# Patient Record
Sex: MALE | Race: WHITE | NOT HISPANIC OR LATINO | Employment: FULL TIME | URBAN - NONMETROPOLITAN AREA
[De-identification: names, ages, dates, MRNs, and addresses within clinical notes are randomized per-mention and may not be internally consistent; named-entity substitution may affect disease eponyms.]

---

## 2017-02-23 RX ORDER — TOPIRAMATE 100 MG/1
CAPSULE, EXTENDED RELEASE ORAL
Qty: 30 CAPSULE | Refills: 5 | Status: SHIPPED | OUTPATIENT
Start: 2017-02-23 | End: 2018-07-26 | Stop reason: SDUPTHER

## 2017-02-24 ENCOUNTER — OFFICE VISIT (OUTPATIENT)
Dept: ENDOCRINOLOGY | Facility: CLINIC | Age: 52
End: 2017-02-24

## 2017-02-24 ENCOUNTER — LAB (OUTPATIENT)
Dept: LAB | Facility: HOSPITAL | Age: 52
End: 2017-02-24

## 2017-02-24 VITALS
BODY MASS INDEX: 40.61 KG/M2 | SYSTOLIC BLOOD PRESSURE: 122 MMHG | HEIGHT: 62 IN | WEIGHT: 220.7 LBS | DIASTOLIC BLOOD PRESSURE: 72 MMHG | HEART RATE: 85 BPM

## 2017-02-24 DIAGNOSIS — E29.1 HYPOGONADISM IN MALE: ICD-10-CM

## 2017-02-24 DIAGNOSIS — E66.01 MORBID OBESITY DUE TO EXCESS CALORIES (HCC): ICD-10-CM

## 2017-02-24 DIAGNOSIS — Z78.9 MALE-TO-FEMALE TRANSGENDER PERSON: Primary | ICD-10-CM

## 2017-02-24 DIAGNOSIS — I10 ESSENTIAL HYPERTENSION: ICD-10-CM

## 2017-02-24 DIAGNOSIS — N18.30 CKD (CHRONIC KIDNEY DISEASE) STAGE 3, GFR 30-59 ML/MIN (HCC): ICD-10-CM

## 2017-02-24 DIAGNOSIS — E55.9 VITAMIN D DEFICIENCY: ICD-10-CM

## 2017-02-24 DIAGNOSIS — Z78.9 MALE-TO-FEMALE TRANSGENDER PERSON: ICD-10-CM

## 2017-02-24 LAB
25(OH)D3 SERPL-MCNC: 33 NG/ML (ref 30–100)
ALBUMIN SERPL-MCNC: 4.2 G/DL (ref 3.4–4.8)
ALBUMIN/GLOB SERPL: 1.1 G/DL (ref 1.1–1.8)
ALP SERPL-CCNC: 125 U/L (ref 38–126)
ALT SERPL W P-5'-P-CCNC: 17 U/L (ref 21–72)
ANION GAP SERPL CALCULATED.3IONS-SCNC: 12 MMOL/L (ref 5–15)
AST SERPL-CCNC: 44 U/L (ref 17–59)
BASOPHILS # BLD AUTO: 0.04 10*3/MM3 (ref 0–0.2)
BASOPHILS NFR BLD AUTO: 0.4 % (ref 0–2)
BILIRUB SERPL-MCNC: 1.1 MG/DL (ref 0.2–1.3)
BUN BLD-MCNC: 18 MG/DL (ref 7–21)
BUN/CREAT SERPL: 14.4 (ref 7–25)
CALCIUM SPEC-SCNC: 9.8 MG/DL (ref 8.4–10.2)
CHLORIDE SERPL-SCNC: 106 MMOL/L (ref 95–110)
CO2 SERPL-SCNC: 25 MMOL/L (ref 22–31)
CREAT BLD-MCNC: 1.25 MG/DL (ref 0.7–1.3)
DEPRECATED RDW RBC AUTO: 45.6 FL (ref 35.1–43.9)
EOSINOPHIL # BLD AUTO: 0.23 10*3/MM3 (ref 0–0.7)
EOSINOPHIL NFR BLD AUTO: 2.1 % (ref 0–7)
ERYTHROCYTE [DISTWIDTH] IN BLOOD BY AUTOMATED COUNT: 14 % (ref 11.5–14.5)
GFR SERPL CREATININE-BSD FRML MDRD: 60 ML/MIN/1.73 (ref 56–130)
GLOBULIN UR ELPH-MCNC: 3.7 GM/DL (ref 2.3–3.5)
GLUCOSE BLD-MCNC: 110 MG/DL (ref 60–100)
HCT VFR BLD AUTO: 43 % (ref 39–49)
HGB BLD-MCNC: 14.7 G/DL (ref 13.7–17.3)
IMM GRANULOCYTES # BLD: 0.02 10*3/MM3 (ref 0–0.02)
IMM GRANULOCYTES NFR BLD: 0.2 % (ref 0–0.5)
LYMPHOCYTES # BLD AUTO: 2.3 10*3/MM3 (ref 0.6–4.2)
LYMPHOCYTES NFR BLD AUTO: 21.1 % (ref 10–50)
MCH RBC QN AUTO: 30.4 PG (ref 26.5–34)
MCHC RBC AUTO-ENTMCNC: 34.2 G/DL (ref 31.5–36.3)
MCV RBC AUTO: 89 FL (ref 80–98)
MONOCYTES # BLD AUTO: 0.61 10*3/MM3 (ref 0–0.9)
MONOCYTES NFR BLD AUTO: 5.6 % (ref 0–12)
NEUTROPHILS # BLD AUTO: 7.68 10*3/MM3 (ref 2–8.6)
NEUTROPHILS NFR BLD AUTO: 70.6 % (ref 37–80)
PLATELET # BLD AUTO: 319 10*3/MM3 (ref 150–450)
PMV BLD AUTO: 11.4 FL (ref 8–12)
POTASSIUM BLD-SCNC: 4.8 MMOL/L (ref 3.5–5.1)
PROT SERPL-MCNC: 7.9 G/DL (ref 6.3–8.6)
RBC # BLD AUTO: 4.83 10*6/MM3 (ref 4.37–5.74)
SODIUM BLD-SCNC: 143 MMOL/L (ref 137–145)
VIT B12 BLD-MCNC: 323 PG/ML (ref 239–931)
WBC NRBC COR # BLD: 10.88 10*3/MM3 (ref 3.2–9.8)

## 2017-02-24 PROCEDURE — 84153 ASSAY OF PSA TOTAL: CPT | Performed by: INTERNAL MEDICINE

## 2017-02-24 PROCEDURE — 82670 ASSAY OF TOTAL ESTRADIOL: CPT | Performed by: INTERNAL MEDICINE

## 2017-02-24 PROCEDURE — 99214 OFFICE O/P EST MOD 30 MIN: CPT | Performed by: INTERNAL MEDICINE

## 2017-02-24 PROCEDURE — 85025 COMPLETE CBC W/AUTO DIFF WBC: CPT | Performed by: INTERNAL MEDICINE

## 2017-02-24 PROCEDURE — 84403 ASSAY OF TOTAL TESTOSTERONE: CPT | Performed by: INTERNAL MEDICINE

## 2017-02-24 PROCEDURE — 82306 VITAMIN D 25 HYDROXY: CPT | Performed by: INTERNAL MEDICINE

## 2017-02-24 PROCEDURE — 80053 COMPREHEN METABOLIC PANEL: CPT | Performed by: INTERNAL MEDICINE

## 2017-02-24 PROCEDURE — 36415 COLL VENOUS BLD VENIPUNCTURE: CPT | Performed by: INTERNAL MEDICINE

## 2017-02-24 PROCEDURE — 82607 VITAMIN B-12: CPT | Performed by: INTERNAL MEDICINE

## 2017-02-24 RX ORDER — FINASTERIDE 5 MG/1
5 TABLET, FILM COATED ORAL DAILY
Qty: 30 TABLET | Refills: 5 | Status: SHIPPED | OUTPATIENT
Start: 2017-02-24 | End: 2017-12-29 | Stop reason: SDUPTHER

## 2017-02-24 RX ORDER — ESTRADIOL 2 MG/1
TABLET ORAL
Qty: 15 TABLET | Refills: 5 | Status: SHIPPED | OUTPATIENT
Start: 2017-02-24 | End: 2017-04-25

## 2017-02-24 NOTE — PROGRESS NOTES
Rupesh Valencia Jr is a 52 y.o. male who presents for  evaluation of transgender MTF        Primary Care / Referring Provider  JOHNNY Sawant     followup     reason , transgender Male to Female    duration, since about 5 years ago     timing, constant. She recognizes that he suppressed this desire but now she no longer wants to suppress it     quality, not presently controlled    previous treatment - he took OCP, herbs  presently using makeup   responding to estradiol  aldactone not given due to ARF but on finasteride         2016 nl PSA     I have psychiatrist letter of support 10-16 and sent for scanning     Past Medical History   Diagnosis Date   • Acute sinusitis    • Benign essential hypertension    • Carpal tunnel syndrome    • Chest pain    • Chronic sinusitis    • Cough    • Diabetes mellitus without complication      type II or unspecified type, not stated as uncontrolled      • Diarrhea of presumed infectious origin    • Dyspnea    • Dyspnea on exertion      Angina equivalent      • Dysuria    • Essential hypertension    • Gender dysphoria       transgendered, crossdresses      • Hyperkeratosis    • Hypogonadism in male 2/24/2017   • Impaired fasting blood sugar    • Inflamed seborrheic keratosis    • Male hypogonadism    • Male-to-female transgender person 2/24/2017   • Male-to-female transsexuality    • Migraine    • Morbid obesity due to excess calories 2/24/2017   • Obesity    • Pain in joint      unspecified   • Poisoning due to venomous spider    • Renal impairment    • Routine general medical examination at a health care facility    • Special screening for malignant neoplasm of prostate    • Vitamin D deficiency 2/24/2017     Family History   Problem Relation Age of Onset   • Breast cancer Mother    • Diabetes Mother    • Diabetes Father    • Prostate cancer Father    • Skin cancer Father      Social History   Substance Use Topics   • Smoking status: Never Smoker   • Smokeless tobacco:  Not on file   • Alcohol use No      Comment: lizy         Current Outpatient Prescriptions:   •  amitriptyline (ELAVIL) 50 MG tablet, 1 OR 2 TAB(S) BY MOUTH AT BEDTIME AS NEEDED, Disp: 60 tablet, Rfl: 5  •  atenolol (TENORMIN) 50 MG tablet, Take 1 tablet by mouth Daily., Disp: 30 tablet, Rfl: 2  •  estradiol (ESTRACE) 2 MG tablet, , Disp: , Rfl: 5  •  finasteride (PROSCAR) 5 MG tablet, , Disp: , Rfl: 5  •  fluticasone (FLONASE) 50 MCG/ACT nasal spray, 1 spray into each nostril 2 (Two) Times a Day. Spray in each nostril., Disp: , Rfl:   •  isosorbide mononitrate (IMDUR) 30 MG 24 hr tablet, , Disp: , Rfl: 6  •  potassium chloride (K-DUR,KLOR-CON) 20 MEQ CR tablet, TAKE 2 TABLETS ON DAY 1 THEN TAKE ONE TABLET BY MOUTH EVERY DAY THEREAFTER, Disp: 30 tablet, Rfl: 2  •  sulfamethoxazole-trimethoprim (BACTRIM DS,SEPTRA DS) 800-160 MG per tablet, Take 1 tablet by mouth 2 (Two) Times a Day., Disp: , Rfl:   •  tamsulosin (FLOMAX) 0.4 MG capsule 24 hr capsule, Take 1 capsule by mouth Daily., Disp: 30 capsule, Rfl: 2  •  TROKENDI  MG capsule sustained-release 24 hr, ONE BY MOUTH DAILY, Disp: 30 capsule, Rfl: 5  •  venlafaxine XR (EFFEXOR-XR) 75 MG 24 hr capsule, Take one cap  daily, Disp: 30 capsule, Rfl: 5    Review of Systems    Review of Systems   Constitutional: Positive for fatigue. Negative for activity change, appetite change, chills, diaphoresis, fever and unexpected weight change.   HENT: Negative for congestion, drooling, ear discharge, ear pain, facial swelling, mouth sores, nosebleeds, postnasal drip, rhinorrhea, sinus pressure, sneezing, sore throat, tinnitus, trouble swallowing and voice change.    Eyes: Negative for photophobia, pain, discharge, redness, itching and visual disturbance.   Respiratory: Negative for apnea, cough, choking, chest tightness, shortness of breath, wheezing and stridor.    Cardiovascular: Negative for chest pain, palpitations and leg swelling.   Gastrointestinal: Negative for  "abdominal distention, abdominal pain, anal bleeding, blood in stool, constipation, diarrhea, nausea, rectal pain and vomiting.   Endocrine: Negative for cold intolerance, heat intolerance, polydipsia, polyphagia and polyuria.        Breast enlargement    Genitourinary: Negative for difficulty urinating, dysuria, enuresis, flank pain, frequency, hematuria and urgency.        Decreased penile size    Musculoskeletal: Negative for arthralgias, back pain, gait problem, joint swelling, myalgias, neck pain and neck stiffness.   Skin: Negative for color change, pallor and wound.        Softer skin  Decrease body hair   Less sweating   Allergic/Immunologic: Negative for environmental allergies, food allergies and immunocompromised state.   Neurological: Negative for dizziness, tremors, seizures, syncope, facial asymmetry, speech difficulty, weakness, light-headedness, numbness and headaches.   Hematological: Negative for adenopathy. Does not bruise/bleed easily.   Psychiatric/Behavioral: Negative for agitation, behavioral problems, confusion, decreased concentration, dysphoric mood, hallucinations, self-injury, sleep disturbance and suicidal ideas. The patient is not nervous/anxious and is not hyperactive.         Objective:     Visit Vitals   • /72 (BP Location: Right arm, Patient Position: Sitting, Cuff Size: Large Adult)   • Pulse 85   • Ht 62\" (157.5 cm)   • Wt 220 lb 11.2 oz (100 kg)   • BMI 40.37 kg/m2       Physical Exam   Constitutional: He is oriented to person, place, and time. He appears well-developed and well-nourished. He is cooperative.   HENT:   Head: Normocephalic and atraumatic.   Right Ear: External ear normal.   Left Ear: External ear normal.   Nose: Nose normal.   Mouth/Throat: Oropharynx is clear and moist. No oropharyngeal exudate.   Eyes: Conjunctivae and EOM are normal. Pupils are equal, round, and reactive to light. No scleral icterus. Right eye exhibits normal extraocular motion. Left eye " exhibits normal extraocular motion.   Neck: Neck supple. No JVD present. No muscular tenderness present. No tracheal deviation, no edema and no erythema present. No thyromegaly present.   Cardiovascular: Normal rate, regular rhythm, normal heart sounds and intact distal pulses.  Exam reveals no gallop and no friction rub.    No murmur heard.  Pulmonary/Chest: Effort normal and breath sounds normal. No stridor. No respiratory distress. He has no decreased breath sounds. He has no wheezes. He has no rhonchi. He has no rales. He exhibits no tenderness.   Abdominal: Soft. Bowel sounds are normal. He exhibits no distension and no mass. There is no hepatomegaly. There is no tenderness. There is no rebound and no guarding. No hernia.   Musculoskeletal: Normal range of motion. He exhibits no edema, tenderness or deformity.       Vascular Status -  His exam exhibits right foot vasculature normal. His exam exhibits left foot vasculature normal.  Lymphadenopathy:     He has no cervical adenopathy.   Neurological: He is alert and oriented to person, place, and time. He has normal reflexes. No cranial nerve deficit. He exhibits normal muscle tone. Coordination normal.   Skin: Skin is warm. No rash noted. No erythema. No pallor.   Psychiatric: He has a normal mood and affect. His behavior is normal. Judgment and thought content normal.   Nursing note and vitals reviewed.      Lab Review    Results for orders placed or performed during the hospital encounter of 10/19/16   Urinalysis   Result Value Ref Range    Color, UA DK YELLOW STRAW,YELLOW,DK YELLOW,SHARMIN    Appearance CLEAR CLEAR    Specific Gravity, UA 1.015 1.003 - 1.030    pH, UA 5.0 5.0 - 9.0 pH Units    Leukocytes, UA NEGATIVE NEGATIVE    Nitrite, UA NEGATIVE NEGATIVE    Protein, UA NEGATIVE NEGATIVE    Glucose, Urine NEGATIVE NEGATIVE mg/dl    Ketones, UA NEGATIVE NEGATIVE    Urobilinogen, UA 0.2 0.2 EU/dl    Blood, UA NEGATIVE NEGATIVE   PSA, Total & Free   Result Value  Ref Range    PSA 0.5 0.0 - 4.0 ng/mL    PSA, Free 0.07 N/A ng/mL    PSA, Free % 14.0 %   Estradiol, Sensitive   Result Value Ref Range    Estradiol 36.6 (H) 8.0 - 35.0 pg/mL           Assessment/Plan       ICD-10-CM ICD-9-CM   1. Male-to-female transgender person F64.0 302.85   2. Hypogonadism in male E29.1 257.2   3. Essential hypertension I10 401.9   4. CKD (chronic kidney disease) stage 3, GFR 30-59 ml/min N18.3 585.3   5. Morbid obesity due to excess calories E66.01 278.01   6. Vitamin D deficiency E55.9 268.9            Transgender Male to Female    Male Hypogonadism         baseline prolactin ( nl ), estradiol (17)  testosterone ( 378)    Estradiol 2 mg every other day but sometimes forgetting     no aldactone due to ARF on HCTZ     finasteride 5 mg daily     Now    Component      Latest Ref Rng 5/19/2016 7/19/2016   Testosterone, Total      348 - 1197 ng/dL 378 180 (L)     Component      Latest Ref Rng 5/19/2016 7/19/2016   Estradiol      8.0 - 35.0 pg/mL 17.6 358.0 (H)     Presently not interested in surgery     rtc in 3 months      Renal Failure     Lab Results   Component Value Date    HGBA1C 5.3 07/19/2016    HGBA1C 5.3 07/08/2015    HGBA1C 5.4 02/21/2014     Lab Results   Component Value Date    GLUCOSE 122 (H) 10/19/2016    BUN 14 10/19/2016    CREATININE 1.1 10/19/2016    CO2 25 10/19/2016    CALCIUM 9.4 10/19/2016    ALBUMIN 4.4 10/19/2016    AST 50 10/19/2016    ALT 19 (L) 10/19/2016     Lab Results   Component Value Date    WBC 10.5 (H) 10/19/2016    HGB 15.1 10/19/2016    HCT 42.1 10/19/2016    MCV 87.5 10/19/2016     10/19/2016           Lipid Management  No results found for: CHOL  Lab Results   Component Value Date    TRIG 89 07/19/2016     Lab Results   Component Value Date    HDL 57 (L) 07/19/2016     Lab Results   Component Value Date    LDLCALC 91 07/19/2016       Blood Pressure Management:    Vitals:    02/24/17 1315   BP: 122/72   Pulse: 85       On atenolol 50 mg daily   imdur 30  mg er daily , had chest pain , cath nl around 2014 , started him on imdur      Preventive Care:      Not a smoker     Weight Related:   Wt Readings from Last 3 Encounters:   02/24/17 220 lb 11.2 oz (100 kg)   10/19/16 226 lb 8 oz (103 kg)   09/21/16 236 lb (107 kg)     Body mass index is 40.37 kg/(m^2).    Advised to consume 500 calories less per day    Exercise 30 min daily       Lost 15 lbs initially , now 6 more     Bone Health    Lab Results   Component Value Date    CALCIUM 9.4 10/19/2016     Reassess vit d    Thyroid Health  No results found for: TSH      Lab Results   Component Value Date    OEEDDUGY32 321 10/19/2016             I reviewed and summarized records from JOHNNY Sawant from 2016 and I reviewed / ordered labs.     No orders of the defined types were placed in this encounter.        A copy of my note was sent to JOHNNY Sawant    Please see my above opinion and suggestions.

## 2017-02-26 LAB
CONV COMMENT: NORMAL
TESTOST SERPL-MCNC: 584 NG/DL (ref 348–1197)

## 2017-03-24 ENCOUNTER — OFFICE VISIT (OUTPATIENT)
Dept: FAMILY MEDICINE CLINIC | Facility: CLINIC | Age: 52
End: 2017-03-24

## 2017-03-24 VITALS
HEART RATE: 80 BPM | SYSTOLIC BLOOD PRESSURE: 138 MMHG | DIASTOLIC BLOOD PRESSURE: 80 MMHG | BODY MASS INDEX: 41.77 KG/M2 | WEIGHT: 227 LBS | HEIGHT: 62 IN

## 2017-03-24 DIAGNOSIS — J01.91 ACUTE RECURRENT SINUSITIS, UNSPECIFIED LOCATION: Primary | ICD-10-CM

## 2017-03-24 PROCEDURE — 99213 OFFICE O/P EST LOW 20 MIN: CPT | Performed by: NURSE PRACTITIONER

## 2017-03-24 NOTE — PROGRESS NOTES
Subjective   Rupesh Wilbert Valencia Jr is a 52 y.o. male.     History of Present Illness     C/O sinus congestion and yellow drainage for 6 weeks. This is a recurrent problems but has decreased in frequency the past year.  He had a negative sinus imaging in the past and had been evaluated by ENT .    He continues to see Dr. Peter and is doing well with hormones.     The following portions of the patient's history were reviewed and updated as appropriate: allergies, current medications, past social history, past surgical history and problem list.    Review of Systems   Constitutional: Negative for activity change, chills, fatigue and fever.   HENT: Positive for congestion and postnasal drip. Negative for rhinorrhea, sinus pressure and sore throat.    Eyes: Negative for pain, discharge, itching and visual disturbance.   Respiratory: Negative for cough, shortness of breath and wheezing.    Cardiovascular: Negative for chest pain, palpitations and leg swelling.   Gastrointestinal: Negative for abdominal pain, blood in stool, constipation, diarrhea, nausea and vomiting.   Endocrine: Negative for polydipsia and polyuria.   Genitourinary: Negative for dysuria, flank pain, frequency, hematuria and urgency.   Musculoskeletal: Negative for arthralgias, back pain, gait problem and myalgias.   Skin: Negative for rash.   Neurological: Positive for headaches. Negative for dizziness, tremors, seizures, syncope and weakness.   Hematological: Negative for adenopathy. Does not bruise/bleed easily.   Psychiatric/Behavioral: Negative for confusion, dysphoric mood, sleep disturbance and suicidal ideas. The patient is not nervous/anxious.        Objective   Physical Exam   Constitutional: He is oriented to person, place, and time. He appears well-developed and well-nourished. No distress.   HENT:   Head: Normocephalic and atraumatic.   Mouth/Throat: Oropharynx is clear and moist.   Yellow/bloody nasal drainage bilateral nostrils    Eyes:  Conjunctivae are normal. Pupils are equal, round, and reactive to light.   Cardiovascular: Normal rate, regular rhythm and normal heart sounds.  Exam reveals no gallop and no friction rub.    No murmur heard.  Pulmonary/Chest: Effort normal and breath sounds normal. No respiratory distress. He has no wheezes. He has no rales.   Musculoskeletal: He exhibits no edema or deformity.   Mild lordosis. Gait steady.    Neurological: He is alert and oriented to person, place, and time.   No balance impairment.    Skin: Skin is warm and dry. No rash noted. He is not diaphoretic. No erythema. No pallor.   Psychiatric: He has a normal mood and affect. His behavior is normal.   Nursing note and vitals reviewed.      Assessment/Plan   Problems Addressed this Visit     None      Visit Diagnoses     Acute recurrent sinusitis, unspecified location    -  Primary    Relevant Medications    mupirocin (BACTROBAN) 2 % ointment    sulfamethoxazole-trimethoprim (BACTRIM DS,SEPTRA DS) 800-160 MG per tablet        Begin Bactroban ointment and Bactrim PO.  Continue current medications. Reviewed potential medication side effects and benefits. Instructed to report side effects. Report if symptoms worsen or persist. Understanding expressed.

## 2017-03-27 RX ORDER — SULFAMETHOXAZOLE AND TRIMETHOPRIM 800; 160 MG/1; MG/1
1 TABLET ORAL 2 TIMES DAILY
Qty: 20 TABLET | Refills: 0 | Status: SHIPPED | OUTPATIENT
Start: 2017-03-27 | End: 2017-07-21 | Stop reason: SDUPTHER

## 2017-04-06 RX ORDER — TAMSULOSIN HYDROCHLORIDE 0.4 MG/1
CAPSULE ORAL
Qty: 30 CAPSULE | Refills: 2 | Status: SHIPPED | OUTPATIENT
Start: 2017-04-06 | End: 2017-08-11 | Stop reason: SDUPTHER

## 2017-04-06 RX ORDER — ATENOLOL 50 MG/1
TABLET ORAL
Qty: 30 TABLET | Refills: 2 | Status: SHIPPED | OUTPATIENT
Start: 2017-04-06 | End: 2017-08-11 | Stop reason: SDUPTHER

## 2017-04-06 RX ORDER — POTASSIUM CHLORIDE 20 MEQ/1
TABLET, EXTENDED RELEASE ORAL
Qty: 30 TABLET | Refills: 2 | Status: SHIPPED | OUTPATIENT
Start: 2017-04-06 | End: 2017-11-06 | Stop reason: SDUPTHER

## 2017-04-06 RX ORDER — ISOSORBIDE MONONITRATE 30 MG/1
TABLET, EXTENDED RELEASE ORAL
Qty: 30 TABLET | Refills: 6 | Status: SHIPPED | OUTPATIENT
Start: 2017-04-06 | End: 2019-10-11

## 2017-04-21 ENCOUNTER — OFFICE VISIT (OUTPATIENT)
Dept: FAMILY MEDICINE CLINIC | Facility: CLINIC | Age: 52
End: 2017-04-21

## 2017-04-21 VITALS
WEIGHT: 229 LBS | HEIGHT: 62 IN | SYSTOLIC BLOOD PRESSURE: 154 MMHG | DIASTOLIC BLOOD PRESSURE: 92 MMHG | BODY MASS INDEX: 42.14 KG/M2 | HEART RATE: 73 BPM

## 2017-04-21 DIAGNOSIS — I10 ESSENTIAL HYPERTENSION: ICD-10-CM

## 2017-04-21 DIAGNOSIS — L30.9 DERMATITIS: Primary | ICD-10-CM

## 2017-04-21 PROCEDURE — 99213 OFFICE O/P EST LOW 20 MIN: CPT | Performed by: NURSE PRACTITIONER

## 2017-04-21 PROCEDURE — 96372 THER/PROPH/DIAG INJ SC/IM: CPT | Performed by: NURSE PRACTITIONER

## 2017-04-21 RX ORDER — TRIAMCINOLONE ACETONIDE 5 MG/G
CREAM TOPICAL 3 TIMES DAILY
Qty: 30 G | Refills: 0 | Status: SHIPPED | OUTPATIENT
Start: 2017-04-21 | End: 2017-07-21 | Stop reason: SDUPTHER

## 2017-04-21 RX ORDER — METHYLPREDNISOLONE ACETATE 80 MG/ML
80 INJECTION, SUSPENSION INTRA-ARTICULAR; INTRALESIONAL; INTRAMUSCULAR; SOFT TISSUE ONCE
Status: COMPLETED | OUTPATIENT
Start: 2017-04-21 | End: 2017-04-21

## 2017-04-21 RX ADMIN — METHYLPREDNISOLONE ACETATE 80 MG: 80 INJECTION, SUSPENSION INTRA-ARTICULAR; INTRALESIONAL; INTRAMUSCULAR; SOFT TISSUE at 16:10

## 2017-04-25 RX ORDER — ESTRADIOL 2 MG/1
2 TABLET ORAL DAILY
Qty: 30 TABLET | Refills: 11 | Status: SHIPPED | OUTPATIENT
Start: 2017-04-25 | End: 2018-05-22 | Stop reason: SDUPTHER

## 2017-07-21 ENCOUNTER — OFFICE VISIT (OUTPATIENT)
Dept: FAMILY MEDICINE CLINIC | Facility: CLINIC | Age: 52
End: 2017-07-21

## 2017-07-21 VITALS
HEART RATE: 60 BPM | HEIGHT: 62 IN | SYSTOLIC BLOOD PRESSURE: 142 MMHG | DIASTOLIC BLOOD PRESSURE: 100 MMHG | BODY MASS INDEX: 41.59 KG/M2 | WEIGHT: 226 LBS

## 2017-07-21 DIAGNOSIS — R35.89 POLYURIA: ICD-10-CM

## 2017-07-21 DIAGNOSIS — N18.30 CKD (CHRONIC KIDNEY DISEASE) STAGE 3, GFR 30-59 ML/MIN (HCC): ICD-10-CM

## 2017-07-21 DIAGNOSIS — F32.A DEPRESSION, UNSPECIFIED DEPRESSION TYPE: ICD-10-CM

## 2017-07-21 DIAGNOSIS — R73.9 HYPERGLYCEMIA: ICD-10-CM

## 2017-07-21 DIAGNOSIS — I10 ESSENTIAL HYPERTENSION: Primary | ICD-10-CM

## 2017-07-21 DIAGNOSIS — L30.9 DERMATITIS: ICD-10-CM

## 2017-07-21 DIAGNOSIS — J01.91 ACUTE RECURRENT SINUSITIS, UNSPECIFIED LOCATION: ICD-10-CM

## 2017-07-21 PROCEDURE — 99214 OFFICE O/P EST MOD 30 MIN: CPT | Performed by: NURSE PRACTITIONER

## 2017-07-21 RX ORDER — TRIAMCINOLONE ACETONIDE 5 MG/G
CREAM TOPICAL 3 TIMES DAILY
Qty: 80 G | Refills: 1 | Status: SHIPPED | OUTPATIENT
Start: 2017-07-21 | End: 2019-10-11

## 2017-07-21 RX ORDER — FLUOXETINE HYDROCHLORIDE 20 MG/1
20 CAPSULE ORAL DAILY
Qty: 30 CAPSULE | Refills: 5 | Status: SHIPPED | OUTPATIENT
Start: 2017-07-21 | End: 2018-02-05 | Stop reason: SDUPTHER

## 2017-07-21 RX ORDER — SULFAMETHOXAZOLE AND TRIMETHOPRIM 800; 160 MG/1; MG/1
1 TABLET ORAL 2 TIMES DAILY
Qty: 20 TABLET | Refills: 0 | Status: SHIPPED | OUTPATIENT
Start: 2017-07-21 | End: 2017-08-25

## 2017-07-21 RX ORDER — LOSARTAN POTASSIUM AND HYDROCHLOROTHIAZIDE 12.5; 5 MG/1; MG/1
1 TABLET ORAL DAILY
Qty: 30 TABLET | Refills: 5 | Status: SHIPPED | OUTPATIENT
Start: 2017-07-21 | End: 2018-02-05 | Stop reason: SDUPTHER

## 2017-07-21 RX ORDER — FLUCONAZOLE 150 MG/1
150 TABLET ORAL ONCE
Qty: 2 TABLET | Refills: 1 | Status: SHIPPED | OUTPATIENT
Start: 2017-07-21 | End: 2017-07-21

## 2017-07-21 RX ORDER — VENLAFAXINE HYDROCHLORIDE 37.5 MG/1
37.5 CAPSULE, EXTENDED RELEASE ORAL DAILY
Qty: 14 CAPSULE | Refills: 0 | Status: SHIPPED | OUTPATIENT
Start: 2017-07-21 | End: 2017-08-04

## 2017-07-21 NOTE — PROGRESS NOTES
Subjective   Rupesh Wilbert Valencia Jr is a 52 y.o. male.   Chief Complaint   Patient presents with   • Sinusitis         Sinusitis   Associated symptoms include congestion, headaches and sinus pressure. Pertinent negatives include no chills, coughing, shortness of breath or sore throat.      Sinus headache has been for months. He has yellow drainage.     His B/P is elevated today. He denies taking decongestants.     He has an itchy genital rash improved a little with antifungal cream.     His depression screen was positive. He relates this to being isolated in his job a a . He is usually a very social person.     PHQ-9 Depression Screening 7/21/2017   Little interest or pleasure in doing things 1   Feeling down, depressed, or hopeless 1   Trouble falling or staying asleep, or sleeping too much 1   Feeling tired or having little energy 0   Poor appetite or overeating 0   Feeling bad about yourself - or that you are a failure or have let yourself or your family down 1   Trouble concentrating on things, such as reading the newspaper or watching television 0   Moving or speaking so slowly that other people could have noticed. Or the opposite - being so fidgety or restless that you have been moving around a lot more than usual 0   Thoughts that you would be better off dead, or of hurting yourself in some way 2   PHQ-9 Total Score 6   If you checked off any problems, how difficult have these problems made it for you to do your work, take care of things at home, or get along with other people? Not difficult at all       The following portions of the patient's history were reviewed and updated as appropriate: allergies, current medications, past family history, past medical history, past surgical history and problem list.    Review of Systems   Constitutional: Negative for activity change, chills, fatigue and fever.   HENT: Positive for congestion, postnasal drip and sinus pressure. Negative for rhinorrhea and sore  throat.    Eyes: Negative for pain, discharge, itching and visual disturbance.   Respiratory: Negative for cough, shortness of breath and wheezing.    Cardiovascular: Negative for chest pain, palpitations and leg swelling.   Gastrointestinal: Negative for abdominal pain, blood in stool, constipation, diarrhea, nausea and vomiting.   Endocrine: Negative for polydipsia and polyuria.   Genitourinary: Negative for dysuria, flank pain, frequency, hematuria and urgency.   Musculoskeletal: Negative for arthralgias, back pain, gait problem and myalgias.   Skin: Positive for rash.   Neurological: Positive for headaches. Negative for dizziness, tremors, seizures, syncope and weakness.   Hematological: Negative for adenopathy. Does not bruise/bleed easily.   Psychiatric/Behavioral: Positive for dysphoric mood. Negative for confusion, sleep disturbance and suicidal ideas. The patient is not nervous/anxious.        Objective   Physical Exam   Constitutional: He is oriented to person, place, and time. He appears well-developed and well-nourished. No distress.   Cardiovascular: Normal rate, regular rhythm and normal heart sounds.  Exam reveals no gallop and no friction rub.    No murmur heard.  Pulmonary/Chest: Effort normal and breath sounds normal. No respiratory distress. He has no wheezes. He has no rales.   Musculoskeletal: He exhibits no edema or deformity.   Mild lordosis. Gait steady.    Neurological: He is alert and oriented to person, place, and time.   No balance impairment.    Skin: Skin is warm and dry. Rash noted. He is not diaphoretic. No erythema. No pallor.   Faint pink rash present to bilateral popliteal areas.    He also has a rash to the inguinal areas.  This was not assessed today.   Psychiatric: He has a normal mood and affect. His behavior is normal.   Nursing note and vitals reviewed.      Assessment/Plan   Rupesh was seen today for sinusitis.    Diagnoses and all orders for this visit:    Essential  hypertension  -     losartan-hydrochlorothiazide (HYZAAR) 50-12.5 MG per tablet; Take 1 tablet by mouth Daily.  -     Lipid panel; Future    Dermatitis  -     fluconazole (DIFLUCAN) 150 MG tablet; Take 1 tablet by mouth 1 (One) Time for 1 dose. And repeat in 3 days if needed for yeast infection.  -     triamcinolone (KENALOG) 0.5 % cream; Apply  topically 3 (Three) Times a Day. To affected area until healed.    Acute recurrent sinusitis, unspecified location  -     sulfamethoxazole-trimethoprim (BACTRIM DS,SEPTRA DS) 800-160 MG per tablet; Take 1 tablet by mouth 2 (Two) Times a Day.    Polyuria  -     Hemoglobin A1c; Future    CKD (chronic kidney disease) stage 3, GFR 30-59 ml/min    Hyperglycemia  -     Hemoglobin A1c; Future  -     Lipid panel; Future    Depression, unspecified depression type  -     venlafaxine XR (EFFEXOR XR) 37.5 MG 24 hr capsule; Take 1 capsule by mouth Daily for 14 days. For two weeks then D/C  -     FLUoxetine (PROZAC) 20 MG capsule; Take 1 capsule by mouth Daily.        Most recent labs reviewed.  Begin antibiotic.  Add losartan HCT for blood pressure. He will return for labs for myself and Dr. Peter. He will monitor his blood pressure home and report if it remains above 140/90.  Effexor dose decreased to take for 2 weeks.  Begin Prozac.  He has no phone number and will call if his depression symptoms worsen.  He will follow up in one month and sooner if needed.

## 2017-07-21 NOTE — PROGRESS NOTES
Subjective   Rupesh Valencia Jr is a 52 y.o. male.     Sinusitis   Associated symptoms include congestion and headaches. Pertinent negatives include no chills, coughing, shortness of breath, sinus pressure or sore throat.      Rupesh has been experiencing and Itchy, burning rash that began behind both knees.  The rash began after his jeans got wet at work and he believes it might have been related to the sheering of the fabric.    The following portions of the patient's history were reviewed and updated as appropriate: allergies, current medications, past family history, past medical history, past surgical history and problem list.    Review of Systems   Constitutional: Negative for activity change, chills, fatigue and fever.   HENT: Positive for congestion and postnasal drip. Negative for rhinorrhea, sinus pressure and sore throat.    Eyes: Negative for pain, discharge, itching and visual disturbance.   Respiratory: Negative for cough, shortness of breath and wheezing.    Cardiovascular: Negative for chest pain, palpitations and leg swelling.   Gastrointestinal: Negative for abdominal pain, blood in stool, constipation, diarrhea, nausea and vomiting.   Endocrine: Negative for polydipsia and polyuria.   Genitourinary: Negative for dysuria, flank pain, frequency, hematuria and urgency.   Musculoskeletal: Negative for arthralgias, back pain, gait problem and myalgias.   Skin: Positive for rash.   Neurological: Positive for headaches. Negative for dizziness, tremors, seizures, syncope and weakness.   Hematological: Negative for adenopathy. Does not bruise/bleed easily.   Psychiatric/Behavioral: Negative for confusion, dysphoric mood, sleep disturbance and suicidal ideas. The patient is not nervous/anxious.        Objective   Physical Exam   Constitutional: He is oriented to person, place, and time. He appears well-developed and well-nourished. No distress.   Cardiovascular: Normal rate, regular rhythm and normal  heart sounds.  Exam reveals no gallop and no friction rub.    No murmur heard.  Pulmonary/Chest: Effort normal and breath sounds normal. No respiratory distress. He has no wheezes. He has no rales.   Musculoskeletal: He exhibits no edema or deformity.   Mild lordosis. Gait steady.    Neurological: He is alert and oriented to person, place, and time.   No balance impairment.    Skin: Skin is warm and dry. Rash noted. He is not diaphoretic. No erythema. No pallor.   Rash present to bilateral popliteal areas.  Confluent red  patches and small vesicles present   Psychiatric: He has a normal mood and affect. His behavior is normal.   Nursing note and vitals reviewed.      Assessment/Plan   Rupesh was seen today for sinusitis.    Diagnoses and all orders for this visit:    Essential hypertension  -     losartan-hydrochlorothiazide (HYZAAR) 50-12.5 MG per tablet; Take 1 tablet by mouth Daily.  -     Lipid panel; Future    Dermatitis  -     fluconazole (DIFLUCAN) 150 MG tablet; Take 1 tablet by mouth 1 (One) Time for 1 dose. And repeat in 3 days if needed for yeast infection.  -     triamcinolone (KENALOG) 0.5 % cream; Apply  topically 3 (Three) Times a Day. To affected area until healed.    Acute recurrent sinusitis, unspecified location  -     sulfamethoxazole-trimethoprim (BACTRIM DS,SEPTRA DS) 800-160 MG per tablet; Take 1 tablet by mouth 2 (Two) Times a Day.    Polyuria  -     Hemoglobin A1c; Future    CKD (chronic kidney disease) stage 3, GFR 30-59 ml/min    Hyperglycemia  -     Hemoglobin A1c; Future  -     Lipid panel; Future    Depression, unspecified depression type  -     venlafaxine XR (EFFEXOR XR) 37.5 MG 24 hr capsule; Take 1 capsule by mouth Daily for 14 days. For two weeks then D/C  -     FLUoxetine (PROZAC) 20 MG capsule; Take 1 capsule by mouth Daily.     Steroid shot given.  He will monitor his blood pressure home and report if it remains above 140/90.  He will begin the Kenalog cream and let me know if  the rash does not improve .

## 2017-08-11 ENCOUNTER — LAB (OUTPATIENT)
Dept: LAB | Facility: OTHER | Age: 52
End: 2017-08-11

## 2017-08-11 DIAGNOSIS — R73.9 HYPERGLYCEMIA: ICD-10-CM

## 2017-08-11 DIAGNOSIS — I10 ESSENTIAL HYPERTENSION: ICD-10-CM

## 2017-08-11 DIAGNOSIS — R35.89 POLYURIA: ICD-10-CM

## 2017-08-11 LAB
ALBUMIN SERPL-MCNC: 3.7 G/DL (ref 3.2–5.5)
ALBUMIN/GLOB SERPL: 1 G/DL (ref 1–3)
ALP SERPL-CCNC: 110 U/L (ref 15–121)
ALT SERPL W P-5'-P-CCNC: 16 U/L (ref 10–60)
ANION GAP SERPL CALCULATED.3IONS-SCNC: 9 MMOL/L (ref 5–15)
AST SERPL-CCNC: 21 U/L (ref 10–60)
BASOPHILS # BLD AUTO: 0.03 10*3/MM3 (ref 0–0.2)
BASOPHILS NFR BLD AUTO: 0.3 % (ref 0–2)
BILIRUB SERPL-MCNC: 1.1 MG/DL (ref 0.2–1)
BUN BLD-MCNC: 17 MG/DL (ref 8–25)
BUN/CREAT SERPL: 15.5 (ref 7–25)
CALCIUM SPEC-SCNC: 9 MG/DL (ref 8.4–10.8)
CHLORIDE SERPL-SCNC: 105 MMOL/L (ref 100–112)
CHOLEST SERPL-MCNC: 176 MG/DL (ref 150–200)
CO2 SERPL-SCNC: 23 MMOL/L (ref 20–32)
CREAT BLD-MCNC: 1.1 MG/DL (ref 0.4–1.3)
DEPRECATED RDW RBC AUTO: 44.7 FL (ref 35.1–43.9)
EOSINOPHIL # BLD AUTO: 0.33 10*3/MM3 (ref 0–0.7)
EOSINOPHIL NFR BLD AUTO: 3.8 % (ref 0–7)
ERYTHROCYTE [DISTWIDTH] IN BLOOD BY AUTOMATED COUNT: 13.6 % (ref 11.5–14.5)
GFR SERPL CREATININE-BSD FRML MDRD: 70 ML/MIN/1.73 (ref 56–130)
GLOBULIN UR ELPH-MCNC: 3.7 GM/DL (ref 2.5–4.6)
GLUCOSE BLD-MCNC: 101 MG/DL (ref 70–100)
HBA1C MFR BLD: 5 % (ref 4–5.6)
HCT VFR BLD AUTO: 40 % (ref 39–49)
HDLC SERPL-MCNC: 56 MG/DL (ref 35–100)
HGB BLD-MCNC: 13.8 G/DL (ref 13.7–17.3)
LDLC SERPL CALC-MCNC: 107 MG/DL
LDLC/HDLC SERPL: 1.9 {RATIO}
LYMPHOCYTES # BLD AUTO: 1.44 10*3/MM3 (ref 0.6–4.2)
LYMPHOCYTES NFR BLD AUTO: 16.5 % (ref 10–50)
MCH RBC QN AUTO: 31.6 PG (ref 26.5–34)
MCHC RBC AUTO-ENTMCNC: 34.5 G/DL (ref 31.5–36.3)
MCV RBC AUTO: 91.5 FL (ref 80–98)
MONOCYTES # BLD AUTO: 0.76 10*3/MM3 (ref 0–0.9)
MONOCYTES NFR BLD AUTO: 8.7 % (ref 0–12)
NEUTROPHILS # BLD AUTO: 6.18 10*3/MM3 (ref 2–8.6)
NEUTROPHILS NFR BLD AUTO: 70.7 % (ref 37–80)
PLATELET # BLD AUTO: 257 10*3/MM3 (ref 150–450)
PMV BLD AUTO: 10 FL (ref 8–12)
POTASSIUM BLD-SCNC: 3.9 MMOL/L (ref 3.4–5.4)
PROT SERPL-MCNC: 7.4 G/DL (ref 6.7–8.2)
RBC # BLD AUTO: 4.37 10*6/MM3 (ref 4.37–5.74)
SODIUM BLD-SCNC: 137 MMOL/L (ref 134–146)
TRIGL SERPL-MCNC: 67 MG/DL (ref 35–160)
VLDLC SERPL-MCNC: 13.4 MG/DL
WBC NRBC COR # BLD: 8.74 10*3/MM3 (ref 3.2–9.8)

## 2017-08-11 PROCEDURE — 85025 COMPLETE CBC W/AUTO DIFF WBC: CPT | Performed by: INTERNAL MEDICINE

## 2017-08-11 PROCEDURE — 84403 ASSAY OF TOTAL TESTOSTERONE: CPT | Performed by: INTERNAL MEDICINE

## 2017-08-11 PROCEDURE — 82670 ASSAY OF TOTAL ESTRADIOL: CPT | Performed by: INTERNAL MEDICINE

## 2017-08-11 PROCEDURE — 80061 LIPID PANEL: CPT | Performed by: NURSE PRACTITIONER

## 2017-08-11 PROCEDURE — 80053 COMPREHEN METABOLIC PANEL: CPT | Performed by: INTERNAL MEDICINE

## 2017-08-11 PROCEDURE — 83036 HEMOGLOBIN GLYCOSYLATED A1C: CPT | Performed by: NURSE PRACTITIONER

## 2017-08-11 PROCEDURE — 36415 COLL VENOUS BLD VENIPUNCTURE: CPT | Performed by: INTERNAL MEDICINE

## 2017-08-11 PROCEDURE — 84146 ASSAY OF PROLACTIN: CPT | Performed by: INTERNAL MEDICINE

## 2017-08-14 RX ORDER — ATENOLOL 50 MG/1
TABLET ORAL
Qty: 30 TABLET | Refills: 2 | Status: SHIPPED | OUTPATIENT
Start: 2017-08-14 | End: 2017-11-02 | Stop reason: SDUPTHER

## 2017-08-14 RX ORDER — TAMSULOSIN HYDROCHLORIDE 0.4 MG/1
CAPSULE ORAL
Qty: 30 CAPSULE | Refills: 2 | Status: SHIPPED | OUTPATIENT
Start: 2017-08-14 | End: 2019-10-22

## 2017-08-16 LAB
ESTRADIOL SERPL HS-MCNC: 140.2 PG/ML (ref 7.6–42.6)
PROLACTIN SERPL-MCNC: 12.9 NG/ML (ref 4–15.2)
TESTOST SERPL-MCNC: 623 NG/DL (ref 264–916)

## 2017-08-25 ENCOUNTER — OFFICE VISIT (OUTPATIENT)
Dept: ENDOCRINOLOGY | Facility: CLINIC | Age: 52
End: 2017-08-25

## 2017-08-25 ENCOUNTER — OFFICE VISIT (OUTPATIENT)
Dept: FAMILY MEDICINE CLINIC | Facility: CLINIC | Age: 52
End: 2017-08-25

## 2017-08-25 VITALS
BODY MASS INDEX: 41.59 KG/M2 | WEIGHT: 226 LBS | SYSTOLIC BLOOD PRESSURE: 124 MMHG | HEART RATE: 80 BPM | HEIGHT: 62 IN | OXYGEN SATURATION: 97 % | DIASTOLIC BLOOD PRESSURE: 82 MMHG

## 2017-08-25 VITALS
HEART RATE: 86 BPM | WEIGHT: 224.3 LBS | SYSTOLIC BLOOD PRESSURE: 140 MMHG | HEIGHT: 62 IN | BODY MASS INDEX: 41.28 KG/M2 | DIASTOLIC BLOOD PRESSURE: 84 MMHG

## 2017-08-25 DIAGNOSIS — E66.01 MORBID OBESITY DUE TO EXCESS CALORIES (HCC): ICD-10-CM

## 2017-08-25 DIAGNOSIS — E55.9 VITAMIN D DEFICIENCY: ICD-10-CM

## 2017-08-25 DIAGNOSIS — I10 ESSENTIAL HYPERTENSION: ICD-10-CM

## 2017-08-25 DIAGNOSIS — R21 RASH: ICD-10-CM

## 2017-08-25 DIAGNOSIS — F32.9 REACTIVE DEPRESSION: ICD-10-CM

## 2017-08-25 DIAGNOSIS — N18.30 CKD (CHRONIC KIDNEY DISEASE) STAGE 3, GFR 30-59 ML/MIN (HCC): ICD-10-CM

## 2017-08-25 DIAGNOSIS — Z78.9 MALE-TO-FEMALE TRANSGENDER PERSON: Primary | ICD-10-CM

## 2017-08-25 DIAGNOSIS — I10 ESSENTIAL HYPERTENSION: Primary | ICD-10-CM

## 2017-08-25 DIAGNOSIS — E29.1 HYPOGONADISM IN MALE: ICD-10-CM

## 2017-08-25 PROCEDURE — 99213 OFFICE O/P EST LOW 20 MIN: CPT | Performed by: NURSE PRACTITIONER

## 2017-08-25 PROCEDURE — 99214 OFFICE O/P EST MOD 30 MIN: CPT | Performed by: INTERNAL MEDICINE

## 2017-08-25 RX ORDER — NYSTATIN 100000 [USP'U]/G
POWDER TOPICAL 2 TIMES DAILY
Qty: 45 G | Refills: 2 | Status: SHIPPED | OUTPATIENT
Start: 2017-08-25 | End: 2017-10-27 | Stop reason: SDUPTHER

## 2017-08-25 NOTE — PROGRESS NOTES
Rupesh Valencia Jr is a 52 y.o. male who presents for  evaluation of transgender MTF        Primary Care / Referring Provider  JOHNNY Sawant     followup     reason , transgender Male to Female    duration, since about 5 years ago     timing, constant. She recognizes that he suppressed this desire but now she no longer wants to suppress it     quality, presently controlled    previous treatment - he took OCP, herbs  presently using makeup   responding to estradiol  aldactone not given due to ARF but on finasteride         2016 nl PSA     I have psychiatrist letter of support 10-16 and sent for scanning     Past Medical History:   Diagnosis Date   • Acute sinusitis    • Benign essential hypertension    • Carpal tunnel syndrome    • Chest pain    • Chronic sinusitis    • Cough    • Diabetes mellitus without complication     type II or unspecified type, not stated as uncontrolled      • Diarrhea of presumed infectious origin    • Dyspnea    • Dyspnea on exertion     Angina equivalent      • Dysuria    • Essential hypertension    • Gender dysphoria      transgendered, crossdresses      • Hyperkeratosis    • Hypogonadism in male 2/24/2017   • Impaired fasting blood sugar    • Inflamed seborrheic keratosis    • Male hypogonadism    • Male-to-female transgender person 2/24/2017   • Male-to-female transsexuality    • Migraine    • Morbid obesity due to excess calories 2/24/2017   • Obesity    • Pain in joint     unspecified   • Poisoning due to venomous spider    • Renal impairment    • Routine general medical examination at a health care facility    • Special screening for malignant neoplasm of prostate    • Vitamin D deficiency 2/24/2017     Family History   Problem Relation Age of Onset   • Breast cancer Mother    • Diabetes Mother    • Diabetes Father    • Prostate cancer Father    • Skin cancer Father      Social History   Substance Use Topics   • Smoking status: Never Smoker   • Smokeless tobacco: Never  Used   • Alcohol use Yes      Comment: occasional         Current Outpatient Prescriptions:   •  amitriptyline (ELAVIL) 50 MG tablet, 1 OR 2 TAB(S) BY MOUTH AT BEDTIME AS NEEDED, Disp: 60 tablet, Rfl: 5  •  atenolol (TENORMIN) 50 MG tablet, TAKE 1 TABLET BY MOUTH DAILY., Disp: 30 tablet, Rfl: 2  •  estradiol (ESTRACE) 2 MG tablet, Take 1 tablet by mouth Daily., Disp: 30 tablet, Rfl: 11  •  finasteride (PROSCAR) 5 MG tablet, Take 1 tablet by mouth Daily., Disp: 30 tablet, Rfl: 5  •  FLUoxetine (PROZAC) 20 MG capsule, Take 1 capsule by mouth Daily., Disp: 30 capsule, Rfl: 5  •  fluticasone (FLONASE) 50 MCG/ACT nasal spray, 1 spray into each nostril 2 (Two) Times a Day. Spray in each nostril., Disp: , Rfl:   •  isosorbide mononitrate (IMDUR) 30 MG 24 hr tablet, TAKE 1 TABLET BY MOUTH EVERY DAY, Disp: 30 tablet, Rfl: 6  •  losartan-hydrochlorothiazide (HYZAAR) 50-12.5 MG per tablet, Take 1 tablet by mouth Daily., Disp: 30 tablet, Rfl: 5  •  mupirocin (BACTROBAN) 2 % ointment, Apply  topically 2 (Two) Times a Day., Disp: 30 g, Rfl: 5  •  potassium chloride (K-DUR,KLOR-CON) 20 MEQ CR tablet, TAKE 2 TABLETS ON DAY 1 THEN TAKE ONE TABLET BY MOUTH EVERY DAY THEREAFTER, Disp: 30 tablet, Rfl: 2  •  sulfamethoxazole-trimethoprim (BACTRIM DS,SEPTRA DS) 800-160 MG per tablet, Take 1 tablet by mouth 2 (Two) Times a Day., Disp: 20 tablet, Rfl: 0  •  tamsulosin (FLOMAX) 0.4 MG capsule 24 hr capsule, TAKE 1 CAPSULE BY MOUTH DAILY., Disp: 30 capsule, Rfl: 2  •  triamcinolone (KENALOG) 0.5 % cream, Apply  topically 3 (Three) Times a Day. To affected area until healed., Disp: 80 g, Rfl: 1  •  TROKENDI  MG capsule sustained-release 24 hr, ONE BY MOUTH DAILY, Disp: 30 capsule, Rfl: 5    Review of Systems    Review of Systems   Constitutional: Positive for fatigue. Negative for activity change, appetite change, chills, diaphoresis, fever and unexpected weight change.   HENT: Negative for congestion, drooling, ear discharge, ear pain,  "facial swelling, mouth sores, nosebleeds, postnasal drip, rhinorrhea, sinus pressure, sneezing, sore throat, tinnitus, trouble swallowing and voice change.    Eyes: Negative for photophobia, pain, discharge, redness, itching and visual disturbance.   Respiratory: Negative for apnea, cough, choking, chest tightness, shortness of breath, wheezing and stridor.    Cardiovascular: Negative for chest pain, palpitations and leg swelling.   Gastrointestinal: Negative for abdominal distention, abdominal pain, anal bleeding, blood in stool, constipation, diarrhea, nausea, rectal pain and vomiting.   Endocrine: Negative for cold intolerance, heat intolerance, polydipsia, polyphagia and polyuria.        Breast enlargement    Genitourinary: Negative for difficulty urinating, dysuria, enuresis, flank pain, frequency, hematuria and urgency.        Decreased penile size    Musculoskeletal: Negative for arthralgias, back pain, gait problem, joint swelling, myalgias, neck pain and neck stiffness.   Skin: Negative for color change, pallor and wound.        Softer skin  Decrease body hair   Less sweating   Allergic/Immunologic: Negative for environmental allergies, food allergies and immunocompromised state.   Neurological: Negative for dizziness, tremors, seizures, syncope, facial asymmetry, speech difficulty, weakness, light-headedness, numbness and headaches.   Hematological: Negative for adenopathy. Does not bruise/bleed easily.   Psychiatric/Behavioral: Negative for agitation, behavioral problems, confusion, decreased concentration, dysphoric mood, hallucinations, self-injury, sleep disturbance and suicidal ideas. The patient is not nervous/anxious and is not hyperactive.         Objective:     /84 (BP Location: Left arm, Patient Position: Sitting, Cuff Size: Adult)  Pulse 86  Ht 62\" (157.5 cm)  Wt 224 lb 4.8 oz (102 kg)  BMI 41.03 kg/m2    Physical Exam   Constitutional: He is oriented to person, place, and time. He " appears well-developed and well-nourished. He is cooperative.   HENT:   Head: Normocephalic and atraumatic.   Right Ear: External ear normal.   Left Ear: External ear normal.   Nose: Nose normal.   Mouth/Throat: Oropharynx is clear and moist. No oropharyngeal exudate.   Eyes: Conjunctivae and EOM are normal. Pupils are equal, round, and reactive to light. No scleral icterus. Right eye exhibits normal extraocular motion. Left eye exhibits normal extraocular motion.   Neck: Neck supple. No JVD present. No muscular tenderness present. No tracheal deviation, no edema and no erythema present. No thyromegaly present.   Cardiovascular: Normal rate, regular rhythm, normal heart sounds and intact distal pulses.  Exam reveals no gallop and no friction rub.    No murmur heard.  Pulmonary/Chest: Effort normal and breath sounds normal. No stridor. No respiratory distress. He has no decreased breath sounds. He has no wheezes. He has no rhonchi. He has no rales. He exhibits no tenderness.   Abdominal: Soft. Bowel sounds are normal. He exhibits no distension and no mass. There is no hepatomegaly. There is no tenderness. There is no rebound and no guarding. No hernia.   Musculoskeletal: Normal range of motion. He exhibits no edema, tenderness or deformity.       Vascular Status -  His exam exhibits right foot vasculature normal. His exam exhibits left foot vasculature normal.  Lymphadenopathy:     He has no cervical adenopathy.   Neurological: He is alert and oriented to person, place, and time. He has normal reflexes. No cranial nerve deficit. He exhibits normal muscle tone. Coordination normal.   Skin: Skin is warm. No rash noted. No erythema. No pallor.   Psychiatric: He has a normal mood and affect. His behavior is normal. Judgment and thought content normal.   Nursing note and vitals reviewed.      Lab Review    Results for orders placed or performed in visit on 08/11/17   Hemoglobin A1c   Result Value Ref Range    Hemoglobin  A1C 5.0 4 - 5.6 %   Lipid panel   Result Value Ref Range    Total Cholesterol 176 150 - 200 mg/dL    Triglycerides 67 35 - 160 mg/dL    HDL Cholesterol 56 35 - 100 mg/dL    LDL Cholesterol  107 mg/dL    VLDL Cholesterol 13.4 mg/dL    LDL/HDL Ratio 1.90            Assessment/Plan       ICD-10-CM ICD-9-CM   1. Male-to-female transgender person F64.0 302.85   2. Hypogonadism in male E29.1 257.2   3. Essential hypertension I10 401.9   4. CKD (chronic kidney disease) stage 3, GFR 30-59 ml/min N18.3 585.3   5. Morbid obesity due to excess calories E66.01 278.01   6. Vitamin D deficiency E55.9 268.9            Transgender Male to Female    Male Hypogonadism       baseline prolactin ( nl ), estradiol (17)  testosterone ( 378)    Estradiol 2 mg every other day but sometimes forgetting     no aldactone due to ARF on HCTZ     finasteride 5 mg daily     Now    Component      Latest Ref Rng 5/19/2016 7/19/2016   Testosterone, Total      348 - 1197 ng/dL 378 180 (L)     Component      Latest Ref Rng 5/19/2016 7/19/2016   Estradiol      8.0 - 35.0 pg/mL 17.6 358.0 (H)       Lab Results   Component Value Date    ESTRADIOL 140.2 (H) 08/11/2017    ESTRADIOL 54.7 (H) 02/24/2017     Lab Results   Component Value Date    TESTOSTERONE 623.0 08/11/2017     Lab Results   Component Value Date    TESTOSTEROTT 584 02/24/2017    TESTOSTEROTT 408 10/19/2016    TESTOSTEROTT 180 (L) 07/19/2016       Increase estrogen to 4 mg daily       Presently not interested in surgery     rtc in 3 months    We spoke about progesterone  Will only do if fsh , lh not suppressed       Renal Failure     Lab Results   Component Value Date    HGBA1C 5.0 08/11/2017    HGBA1C 5.3 07/19/2016    HGBA1C 5.3 07/08/2015     Lab Results   Component Value Date    GLUCOSE 101 (H) 08/11/2017    BUN 17 08/11/2017    CREATININE 1.10 08/11/2017    EGFRIFNONA 70 08/11/2017    BCR 15.5 08/11/2017    CO2 23.0 08/11/2017    CALCIUM 9.0 08/11/2017    ALBUMIN 3.70 08/11/2017    LABIL2  1.0 08/11/2017    AST 21 08/11/2017    ALT 16 08/11/2017     Lab Results   Component Value Date    WBC 8.74 08/11/2017    HGB 13.8 08/11/2017    HCT 40.0 08/11/2017    MCV 91.5 08/11/2017     08/11/2017           Lipid Management  Lab Results   Component Value Date    CHOL 176 08/11/2017     Lab Results   Component Value Date    TRIG 67 08/11/2017    TRIG 89 07/19/2016     Lab Results   Component Value Date    HDL 56 08/11/2017    HDL 57 (L) 07/19/2016     Lab Results   Component Value Date    LDLCALC 107 08/11/2017    LDLCALC 91 07/19/2016       Blood Pressure Management:    Vitals:    08/25/17 0810   BP: 140/84   Pulse: 86       On atenolol 50 mg daily   imdur 30 mg er daily , had chest pain , cath nl around 2014 , started him on imdur      Preventive Care:      Not a smoker     Weight Related:   Wt Readings from Last 3 Encounters:   08/25/17 224 lb 4.8 oz (102 kg)   07/21/17 226 lb (103 kg)   04/21/17 229 lb (104 kg)     Body mass index is 41.03 kg/(m^2).    Advised to consume 500 calories less per day    Exercise 30 min daily       Lost 15 lbs initially , now 6 more     Bone Health    Lab Results   Component Value Date    CALCIUM 9.0 08/11/2017     Lab Results   Component Value Date    [id number] 33.0 02/24/2017    [id number] 52.5 10/19/2016         Reassess vit d    Thyroid Health  No results found for: TSH      Lab Results   Component Value Date    [id number] 323 02/24/2017             I reviewed and summarized records from [facility] Sawant from 2016 and I reviewed / ordered labs.     No orders of the defined types were placed in this encounter.        A copy of my note was sent to JOHNNY Sawant    Please see my above opinion and suggestions.

## 2017-08-31 ENCOUNTER — TELEPHONE (OUTPATIENT)
Dept: ENDOCRINOLOGY | Facility: CLINIC | Age: 52
End: 2017-08-31

## 2017-10-27 DIAGNOSIS — R21 RASH: ICD-10-CM

## 2017-10-27 RX ORDER — NYSTATIN 100000 [USP'U]/G
POWDER TOPICAL
Qty: 45 G | Refills: 2 | Status: SHIPPED | OUTPATIENT
Start: 2017-10-27 | End: 2018-03-07 | Stop reason: SDUPTHER

## 2017-11-02 RX ORDER — ATENOLOL 50 MG/1
50 TABLET ORAL DAILY
Qty: 30 TABLET | Refills: 2 | Status: SHIPPED | OUTPATIENT
Start: 2017-11-02 | End: 2018-07-26

## 2017-11-06 RX ORDER — POTASSIUM CHLORIDE 20 MEQ/1
TABLET, EXTENDED RELEASE ORAL
Qty: 30 TABLET | Refills: 2 | Status: SHIPPED | OUTPATIENT
Start: 2017-11-06 | End: 2018-07-26 | Stop reason: SDUPTHER

## 2017-11-10 ENCOUNTER — OFFICE VISIT (OUTPATIENT)
Dept: FAMILY MEDICINE CLINIC | Facility: CLINIC | Age: 52
End: 2017-11-10

## 2017-11-10 VITALS
BODY MASS INDEX: 43.43 KG/M2 | DIASTOLIC BLOOD PRESSURE: 82 MMHG | TEMPERATURE: 97.7 F | HEART RATE: 72 BPM | WEIGHT: 236 LBS | SYSTOLIC BLOOD PRESSURE: 124 MMHG | HEIGHT: 62 IN

## 2017-11-10 DIAGNOSIS — J01.90 ACUTE SINUSITIS, RECURRENCE NOT SPECIFIED, UNSPECIFIED LOCATION: ICD-10-CM

## 2017-11-10 DIAGNOSIS — F32.9 REACTIVE DEPRESSION: ICD-10-CM

## 2017-11-10 DIAGNOSIS — I10 ESSENTIAL HYPERTENSION: Primary | ICD-10-CM

## 2017-11-10 PROCEDURE — 99213 OFFICE O/P EST LOW 20 MIN: CPT | Performed by: NURSE PRACTITIONER

## 2017-11-10 RX ORDER — SULFAMETHOXAZOLE AND TRIMETHOPRIM 800; 160 MG/1; MG/1
1 TABLET ORAL 2 TIMES DAILY
Qty: 20 TABLET | Refills: 0 | Status: SHIPPED | OUTPATIENT
Start: 2017-11-10 | End: 2017-11-20

## 2017-11-10 NOTE — PROGRESS NOTES
Subjective   Rupesh Wilbert Valencia Jr is a 52 y.o. male.     URI    Associated symptoms include congestion and headaches. Pertinent negatives include no abdominal pain, chest pain, coughing, diarrhea, dysuria, nausea, rash, rhinorrhea, sore throat, vomiting or wheezing.   Anxiety   Patient reports no chest pain, confusion, dizziness, nausea, nervous/anxious behavior, palpitations, shortness of breath or suicidal ideas.     His past medical history is significant for depression.   Depression Patient is not experiencing: confusion, nervousness/anxiety, palpitations, shortness of breath and suicidal ideas.    Sinusitis   Associated symptoms include congestion, headaches and sinus pressure. Pertinent negatives include no chills, coughing, shortness of breath or sore throat.     Due for congestion, present for several days unrelieved by over-the-counter medications.  He gets sinusitis approximately twice per year requiring antibiotic.    He has a history of VIANCA. This is improved with weight loss and he no longer requires CPAP.     His mood is improved with Prozac.  He has had problems with anxiety and depression for years.  This has been worse the past year but is currently improving since the medication change.    His B/P is doing well.  He has had hypertension for years, well controlled with medication.        The following portions of the patient's history were reviewed and updated as appropriate: allergies, current medications, past family history, past medical history, past surgical history and problem list.    Review of Systems   Constitutional: Negative for activity change, chills, fatigue and fever.   HENT: Positive for congestion, postnasal drip and sinus pressure. Negative for rhinorrhea and sore throat.    Eyes: Negative for pain, discharge, itching and visual disturbance.   Respiratory: Negative for cough, shortness of breath and wheezing.    Cardiovascular: Negative for chest pain, palpitations and leg swelling.    Gastrointestinal: Negative for abdominal pain, blood in stool, constipation, diarrhea, nausea and vomiting.   Endocrine: Negative for polydipsia and polyuria.   Genitourinary: Negative for dysuria, flank pain, frequency, hematuria and urgency.   Musculoskeletal: Negative for arthralgias, back pain, gait problem and myalgias.   Skin: Negative for rash.   Neurological: Positive for headaches. Negative for dizziness, tremors, seizures, syncope and weakness.   Hematological: Negative for adenopathy. Does not bruise/bleed easily.   Psychiatric/Behavioral: Negative for confusion, dysphoric mood, sleep disturbance and suicidal ideas. The patient is not nervous/anxious.        Objective   Physical Exam   Constitutional: He is oriented to person, place, and time. He appears well-developed and well-nourished. No distress.   HENT:   Mouth/Throat: Oropharynx is clear and moist.   Right nostril with yellow bloody nasal drainage   Cardiovascular: Normal rate, regular rhythm and normal heart sounds.  Exam reveals no gallop and no friction rub.    No murmur heard.  Pulmonary/Chest: Effort normal and breath sounds normal. No respiratory distress. He has no wheezes. He has no rales.   Musculoskeletal: He exhibits no edema or deformity.   Mild lordosis. Gait steady.    Neurological: He is alert and oriented to person, place, and time.   No balance impairment.    Skin: Skin is warm and dry. No rash noted. He is not diaphoretic. No erythema. No pallor.   Psychiatric: He has a normal mood and affect. His behavior is normal.   Nursing note and vitals reviewed.      Assessment/Plan   Rupesh was seen today for uri.    Diagnoses and all orders for this visit:    Acute sinusitis, recurrence not specified, unspecified location  -     sulfamethoxazole-trimethoprim (BACTRIM DS) 800-160 MG per tablet; Take 1 tablet by mouth 2 (Two) Times a Day for 10 days.   Steroid shot given.  He will monitor his blood pressure home and report if it remains  above 140/90.  He will begin the Kenalog cream and let me know if the rash does not improve .

## 2017-12-29 RX ORDER — FINASTERIDE 5 MG/1
TABLET, FILM COATED ORAL
Qty: 30 TABLET | Refills: 5 | Status: SHIPPED | OUTPATIENT
Start: 2017-12-29 | End: 2018-07-26 | Stop reason: SDUPTHER

## 2018-02-05 DIAGNOSIS — I10 ESSENTIAL HYPERTENSION: ICD-10-CM

## 2018-02-05 DIAGNOSIS — F32.A DEPRESSION, UNSPECIFIED DEPRESSION TYPE: ICD-10-CM

## 2018-02-05 RX ORDER — LOSARTAN POTASSIUM AND HYDROCHLOROTHIAZIDE 12.5; 5 MG/1; MG/1
TABLET ORAL
Qty: 30 TABLET | Refills: 5 | Status: SHIPPED | OUTPATIENT
Start: 2018-02-05 | End: 2018-08-30 | Stop reason: SDUPTHER

## 2018-02-05 RX ORDER — AMITRIPTYLINE HYDROCHLORIDE 50 MG/1
TABLET, FILM COATED ORAL
Qty: 60 TABLET | Refills: 5 | Status: SHIPPED | OUTPATIENT
Start: 2018-02-05 | End: 2018-08-30 | Stop reason: SDUPTHER

## 2018-02-05 RX ORDER — FLUOXETINE HYDROCHLORIDE 20 MG/1
CAPSULE ORAL
Qty: 30 CAPSULE | Refills: 5 | Status: SHIPPED | OUTPATIENT
Start: 2018-02-05 | End: 2018-08-30 | Stop reason: SDUPTHER

## 2018-03-07 DIAGNOSIS — R21 RASH: ICD-10-CM

## 2018-03-08 RX ORDER — NYSTATIN 100000 [USP'U]/G
POWDER TOPICAL
Qty: 45 G | Refills: 2 | Status: SHIPPED | OUTPATIENT
Start: 2018-03-08 | End: 2018-07-26 | Stop reason: SDUPTHER

## 2018-03-12 ENCOUNTER — TELEPHONE (OUTPATIENT)
Dept: ENDOCRINOLOGY | Facility: CLINIC | Age: 53
End: 2018-03-12

## 2018-04-10 DIAGNOSIS — R21 RASH: ICD-10-CM

## 2018-04-10 RX ORDER — NYSTATIN 100000 [USP'U]/G
POWDER TOPICAL
Qty: 45 G | Refills: 0 | Status: SHIPPED | OUTPATIENT
Start: 2018-04-10 | End: 2021-01-08

## 2018-05-22 RX ORDER — ATENOLOL 25 MG/1
50 TABLET ORAL DAILY
Qty: 60 TABLET | Refills: 0 | Status: SHIPPED | OUTPATIENT
Start: 2018-05-22 | End: 2019-01-02 | Stop reason: SDUPTHER

## 2018-05-22 RX ORDER — ESTRADIOL 2 MG/1
TABLET ORAL
Qty: 60 TABLET | Refills: 6 | OUTPATIENT
Start: 2018-05-22

## 2018-05-22 RX ORDER — ESTRADIOL 2 MG/1
2 TABLET ORAL DAILY
Qty: 30 TABLET | Refills: 11 | Status: SHIPPED | OUTPATIENT
Start: 2018-05-22 | End: 2018-06-02 | Stop reason: SDUPTHER

## 2018-05-31 ENCOUNTER — TELEPHONE (OUTPATIENT)
Dept: ENDOCRINOLOGY | Facility: CLINIC | Age: 53
End: 2018-05-31

## 2018-05-31 NOTE — TELEPHONE ENCOUNTER
----- Message from Fausto Morrison MD sent at 5/29/2018  6:15 PM CDT -----  Estradiol 2 mg daily is what I called in   ----- Message -----  From: Luanne Sterling MA  Sent: 5/29/2018   5:30 PM  To: Fausto Morrison MD    Pharmacy is questioning the dose on the Estradiol. Just wanting to make sure you wanted to change it from the previous dose.  There was not a visit ???  Thanks, Luanne

## 2018-06-02 RX ORDER — ESTRADIOL 2 MG/1
4 TABLET ORAL DAILY
Qty: 60 TABLET | Refills: 11 | Status: SHIPPED | OUTPATIENT
Start: 2018-06-02 | End: 2018-07-26 | Stop reason: SDUPTHER

## 2018-06-05 ENCOUNTER — TELEPHONE (OUTPATIENT)
Dept: ENDOCRINOLOGY | Facility: CLINIC | Age: 53
End: 2018-06-05

## 2018-06-20 RX ORDER — ATENOLOL 25 MG/1
50 TABLET ORAL DAILY
Qty: 60 TABLET | Refills: 0 | OUTPATIENT
Start: 2018-06-20

## 2018-07-11 ENCOUNTER — LAB (OUTPATIENT)
Dept: LAB | Facility: OTHER | Age: 53
End: 2018-07-11

## 2018-07-11 DIAGNOSIS — R19.7 DIARRHEA OF PRESUMED INFECTIOUS ORIGIN: Primary | ICD-10-CM

## 2018-07-11 DIAGNOSIS — R19.7 DIARRHEA IN ADULT PATIENT: ICD-10-CM

## 2018-07-11 LAB
ADV 40+41 DNA STL QL NAA+NON-PROBE: NOT DETECTED
ASTRO TYP 1-8 RNA STL QL NAA+NON-PROBE: NOT DETECTED
C CAYETANENSIS DNA STL QL NAA+NON-PROBE: DETECTED
C DIFF TOX GENS STL QL NAA+PROBE: NOT DETECTED
CAMPY SP DNA.DIARRHEA STL QL NAA+PROBE: NOT DETECTED
CRYPTOSP STL CULT: NOT DETECTED
E COLI DNA SPEC QL NAA+PROBE: NOT DETECTED
E HISTOLYT AG STL-ACNC: NOT DETECTED
EAEC PAA PLAS AGGR+AATA ST NAA+NON-PRB: NOT DETECTED
EC STX1 + STX2 GENES STL NAA+PROBE: NOT DETECTED
EPEC EAE GENE STL QL NAA+NON-PROBE: NOT DETECTED
ETEC LTA+ST1A+ST1B TOX ST NAA+NON-PROBE: NOT DETECTED
G LAMBLIA DNA SPEC QL NAA+PROBE: NOT DETECTED
NOROVIRUS GI+II RNA STL QL NAA+NON-PROBE: NOT DETECTED
P SHIGELLOIDES DNA STL QL NAA+NON-PROBE: NOT DETECTED
RV RNA STL NAA+PROBE: NOT DETECTED
SALMONELLA DNA SPEC QL NAA+PROBE: NOT DETECTED
SAPO I+II+IV+V RNA STL QL NAA+NON-PROBE: NOT DETECTED
SHIGELLA SP+EIEC IPAH STL QL NAA+PROBE: NOT DETECTED
V CHOLERAE DNA SPEC QL NAA+PROBE: NOT DETECTED
VIBRIO DNA SPEC NAA+PROBE: NOT DETECTED
YERSINIA STL CULT: NOT DETECTED

## 2018-07-11 PROCEDURE — 87507 IADNA-DNA/RNA PROBE TQ 12-25: CPT | Performed by: NURSE PRACTITIONER

## 2018-07-14 ENCOUNTER — TELEPHONE (OUTPATIENT)
Dept: URGENT CARE | Facility: CLINIC | Age: 53
End: 2018-07-14

## 2018-07-19 RX ORDER — FINASTERIDE 5 MG/1
TABLET, FILM COATED ORAL
Qty: 30 TABLET | Refills: 5 | OUTPATIENT
Start: 2018-07-19

## 2018-07-19 RX ORDER — ATENOLOL 25 MG/1
50 TABLET ORAL DAILY
Qty: 60 TABLET | Refills: 0 | OUTPATIENT
Start: 2018-07-19

## 2018-07-19 RX ORDER — POTASSIUM CHLORIDE 20 MEQ/1
TABLET, EXTENDED RELEASE ORAL
Qty: 30 TABLET | Refills: 5 | OUTPATIENT
Start: 2018-07-19

## 2018-07-20 LAB
ALBUMIN SERPL-MCNC: 4.3 G/DL (ref 3.5–5)
ALBUMIN/GLOB SERPL: 1.4 G/DL (ref 1.1–1.8)
ALP SERPL-CCNC: 100 U/L (ref 38–126)
ALT SERPL W P-5'-P-CCNC: 20 U/L
ANION GAP SERPL CALCULATED.3IONS-SCNC: 9 MMOL/L (ref 5–15)
AST SERPL-CCNC: 21 U/L (ref 17–59)
BILIRUB SERPL-MCNC: 0.5 MG/DL (ref 0.2–1.3)
BUN BLD-MCNC: 16 MG/DL (ref 9–20)
BUN/CREAT SERPL: 12.2 (ref 7–25)
CALCIUM SPEC-SCNC: 9.8 MG/DL (ref 8.4–10.2)
CHLORIDE SERPL-SCNC: 103 MMOL/L (ref 98–107)
CO2 SERPL-SCNC: 26 MMOL/L (ref 22–30)
CREAT BLD-MCNC: 1.31 MG/DL (ref 0.66–1.25)
DEPRECATED RDW RBC AUTO: 45.1 FL (ref 35.1–43.9)
EOSINOPHIL # BLD MANUAL: 0.29 10*3/MM3 (ref 0–0.7)
EOSINOPHIL NFR BLD MANUAL: 2 % (ref 0–7)
ERYTHROCYTE [DISTWIDTH] IN BLOOD BY AUTOMATED COUNT: 13.9 % (ref 11.5–14.5)
FSH SERPL-ACNC: 3.5 MIU/ML (ref 1.5–9.7)
GFR SERPL CREATININE-BSD FRML MDRD: 57 ML/MIN/1.73 (ref 56–130)
GLOBULIN UR ELPH-MCNC: 3.1 GM/DL (ref 2.3–3.5)
GLUCOSE BLD-MCNC: 110 MG/DL (ref 74–99)
HCT VFR BLD AUTO: 42.7 % (ref 39–49)
HGB BLD-MCNC: 14.8 G/DL (ref 13.7–17.3)
LARGE PLATELETS: ABNORMAL
LH SERPL-ACNC: 4.93 MIU/ML
LYMPHOCYTES # BLD MANUAL: 4.31 10*3/MM3 (ref 0.6–4.2)
LYMPHOCYTES NFR BLD MANUAL: 10 % (ref 0–12)
LYMPHOCYTES NFR BLD MANUAL: 30 % (ref 10–50)
MCH RBC QN AUTO: 31.6 PG (ref 26.5–34)
MCHC RBC AUTO-ENTMCNC: 34.7 G/DL (ref 31.5–36.3)
MCV RBC AUTO: 91 FL (ref 80–98)
MONOCYTES # BLD AUTO: 1.44 10*3/MM3 (ref 0–0.9)
NEUTROPHILS # BLD AUTO: 8.33 10*3/MM3 (ref 2–8.6)
NEUTROPHILS NFR BLD MANUAL: 56 % (ref 37–80)
NEUTS BAND NFR BLD MANUAL: 2 % (ref 0–5)
PLATELET # BLD AUTO: 447 10*3/MM3 (ref 150–450)
PMV BLD AUTO: 9.7 FL (ref 8–12)
POTASSIUM BLD-SCNC: 4.5 MMOL/L (ref 3.4–5)
PROT SERPL-MCNC: 7.4 G/DL (ref 6.3–8.2)
RBC # BLD AUTO: 4.69 10*6/MM3 (ref 4.37–5.74)
RBC MORPH BLD: NORMAL
SMALL PLATELETS BLD QL SMEAR: ABNORMAL
SODIUM BLD-SCNC: 138 MMOL/L (ref 137–145)
WBC MORPH BLD: NORMAL
WBC NRBC COR # BLD: 14.36 10*3/MM3 (ref 3.2–9.8)

## 2018-07-20 PROCEDURE — 83001 ASSAY OF GONADOTROPIN (FSH): CPT | Performed by: INTERNAL MEDICINE

## 2018-07-20 PROCEDURE — 80053 COMPREHEN METABOLIC PANEL: CPT | Performed by: INTERNAL MEDICINE

## 2018-07-20 PROCEDURE — 36415 COLL VENOUS BLD VENIPUNCTURE: CPT | Performed by: INTERNAL MEDICINE

## 2018-07-20 PROCEDURE — 84403 ASSAY OF TOTAL TESTOSTERONE: CPT | Performed by: INTERNAL MEDICINE

## 2018-07-20 PROCEDURE — 85025 COMPLETE CBC W/AUTO DIFF WBC: CPT | Performed by: INTERNAL MEDICINE

## 2018-07-20 PROCEDURE — 83002 ASSAY OF GONADOTROPIN (LH): CPT | Performed by: INTERNAL MEDICINE

## 2018-07-20 PROCEDURE — 82670 ASSAY OF TOTAL ESTRADIOL: CPT | Performed by: INTERNAL MEDICINE

## 2018-07-22 LAB — ESTRADIOL SERPL HS-MCNC: 161.4 PG/ML (ref 7.6–42.6)

## 2018-07-25 LAB — TESTOST SERPL-MCNC: 664.9 NG/DL (ref 264–916)

## 2018-07-26 ENCOUNTER — OFFICE VISIT (OUTPATIENT)
Dept: ENDOCRINOLOGY | Facility: CLINIC | Age: 53
End: 2018-07-26

## 2018-07-26 VITALS
WEIGHT: 249.6 LBS | DIASTOLIC BLOOD PRESSURE: 72 MMHG | OXYGEN SATURATION: 97 % | BODY MASS INDEX: 45.93 KG/M2 | HEART RATE: 87 BPM | HEIGHT: 62 IN | SYSTOLIC BLOOD PRESSURE: 126 MMHG

## 2018-07-26 DIAGNOSIS — I10 ESSENTIAL HYPERTENSION: ICD-10-CM

## 2018-07-26 DIAGNOSIS — N40.0 PROSTATISM: ICD-10-CM

## 2018-07-26 DIAGNOSIS — E29.1 HYPOGONADISM IN MALE: ICD-10-CM

## 2018-07-26 DIAGNOSIS — E55.9 VITAMIN D DEFICIENCY: ICD-10-CM

## 2018-07-26 DIAGNOSIS — Z78.9 MALE-TO-FEMALE TRANSGENDER PERSON: Primary | ICD-10-CM

## 2018-07-26 PROCEDURE — 99214 OFFICE O/P EST MOD 30 MIN: CPT | Performed by: INTERNAL MEDICINE

## 2018-07-26 RX ORDER — ESTRADIOL 2 MG/1
6 TABLET ORAL DAILY
Qty: 90 TABLET | Refills: 11 | Status: SHIPPED | OUTPATIENT
Start: 2018-07-26 | End: 2019-06-27 | Stop reason: SDUPTHER

## 2018-07-26 RX ORDER — POTASSIUM CHLORIDE 20 MEQ/1
TABLET, EXTENDED RELEASE ORAL
Qty: 30 TABLET | Refills: 11 | Status: SHIPPED | OUTPATIENT
Start: 2018-07-26 | End: 2019-08-01 | Stop reason: SDUPTHER

## 2018-07-26 RX ORDER — TOPIRAMATE 100 MG/1
CAPSULE, EXTENDED RELEASE ORAL
Qty: 30 CAPSULE | Refills: 5 | Status: SHIPPED | OUTPATIENT
Start: 2018-07-26 | End: 2018-11-27

## 2018-07-26 RX ORDER — FINASTERIDE 5 MG/1
5 TABLET, FILM COATED ORAL DAILY
Qty: 30 TABLET | Refills: 11 | Status: SHIPPED | OUTPATIENT
Start: 2018-07-26 | End: 2019-06-27 | Stop reason: SDUPTHER

## 2018-07-26 NOTE — PROGRESS NOTES
Rupesh Valencia Jr is a 53 y.o. male who presents for  evaluation of transgender MTF        Primary Care / Referring Provider  JOHNNY Sawant     followup     reason , transgender Male to Female    duration, since about 5 years ago     timing, constant. She recognized that he suppressed this desire but now she no longer wants to suppress it     quality, presently controlled    previous treatment - she took OCP, herbs  presently using makeup   responding to estradiol  aldactone not given due to ARF but on finasteride         2016 nl PSA     I have psychiatrist letter of support 10-16 and sent for scanning     Past Medical History:   Diagnosis Date   • Acute sinusitis    • Benign essential hypertension    • Carpal tunnel syndrome    • Chest pain    • Chronic sinusitis    • Cough    • Diabetes mellitus without complication (CMS/HCC)     type II or unspecified type, not stated as uncontrolled      • Diarrhea of presumed infectious origin    • Dyspnea    • Dyspnea on exertion     Angina equivalent      • Dysuria    • Essential hypertension    • Gender dysphoria      transgendered, crossdresses      • Hyperkeratosis    • Hypogonadism in male 2/24/2017   • Impaired fasting blood sugar    • Inflamed seborrheic keratosis    • Male hypogonadism    • Male-to-female transgender person 2/24/2017   • Male-to-female transsexuality    • Migraine    • Morbid obesity due to excess calories (CMS/HCC) 2/24/2017   • Obesity    • Pain in joint     unspecified   • Poisoning due to venomous spider    • Renal impairment    • Routine general medical examination at a health care facility    • Special screening for malignant neoplasm of prostate    • Vitamin D deficiency 2/24/2017     Family History   Problem Relation Age of Onset   • Breast cancer Mother    • Diabetes Mother    • Diabetes Father    • Prostate cancer Father    • Skin cancer Father      Social History   Substance Use Topics   • Smoking status: Never Smoker   •  Smokeless tobacco: Never Used   • Alcohol use Yes      Comment: occasional         Current Outpatient Prescriptions:   •  amitriptyline (ELAVIL) 50 MG tablet, 1 OR 2 TAB(S) BY MOUTH AT BEDTIME AS NEEDED, Disp: 60 tablet, Rfl: 5  •  atenolol (TENORMIN) 25 MG tablet, Take 2 tablets by mouth Daily. Will need appt and labs for refills with new pcp, Disp: 60 tablet, Rfl: 0  •  estradiol (ESTRACE) 2 MG tablet, Take 2 tablets by mouth Daily., Disp: 60 tablet, Rfl: 11  •  finasteride (PROSCAR) 5 MG tablet, TAKE 1 TABLET BY MOUTH DAILY., Disp: 30 tablet, Rfl: 5  •  FLUoxetine (PROzac) 20 MG capsule, TAKE 1 CAPSULE BY MOUTH DAILY., Disp: 30 capsule, Rfl: 5  •  fluticasone (FLONASE) 50 MCG/ACT nasal spray, 1 spray into each nostril 2 (Two) Times a Day. Spray in each nostril., Disp: , Rfl:   •  isosorbide mononitrate (IMDUR) 30 MG 24 hr tablet, TAKE 1 TABLET BY MOUTH EVERY DAY, Disp: 30 tablet, Rfl: 6  •  losartan-hydrochlorothiazide (HYZAAR) 50-12.5 MG per tablet, TAKE 1 TABLET BY MOUTH DAILY., Disp: 30 tablet, Rfl: 5  •  mupirocin (BACTROBAN) 2 % ointment, Apply  topically 2 (Two) Times a Day., Disp: 30 g, Rfl: 5  •  nystatin (MYCOSTATIN) 275425 UNIT/GM powder, APPLY TOPICALLY 2 (TWO) TIMES A DAY., Disp: 45 g, Rfl: 0  •  potassium chloride (K-DUR,KLOR-CON) 20 MEQ CR tablet, TAKE 2 TABLETS ON DAY 1 THEN TAKE ONE TABLET BY MOUTH EVERY DAY THEREAFTER, Disp: 30 tablet, Rfl: 2  •  tamsulosin (FLOMAX) 0.4 MG capsule 24 hr capsule, TAKE 1 CAPSULE BY MOUTH DAILY., Disp: 30 capsule, Rfl: 2  •  triamcinolone (KENALOG) 0.5 % cream, Apply  topically 3 (Three) Times a Day. To affected area until healed., Disp: 80 g, Rfl: 1  •  TROKENDI  MG capsule sustained-release 24 hr, ONE BY MOUTH DAILY, Disp: 30 capsule, Rfl: 5    Review of Systems    Review of Systems   Constitutional: Positive for fatigue. Negative for activity change, appetite change, chills, diaphoresis, fever and unexpected weight change.   HENT: Negative for congestion,  "drooling, ear discharge, ear pain, facial swelling, mouth sores, nosebleeds, postnasal drip, rhinorrhea, sinus pressure, sneezing, sore throat, tinnitus, trouble swallowing and voice change.    Eyes: Negative for photophobia, pain, discharge, redness, itching and visual disturbance.   Respiratory: Negative for apnea, cough, choking, chest tightness, shortness of breath, wheezing and stridor.    Cardiovascular: Negative for chest pain, palpitations and leg swelling.   Gastrointestinal: Negative for abdominal distention, abdominal pain, anal bleeding, blood in stool, constipation, diarrhea, nausea, rectal pain and vomiting.   Endocrine: Negative for cold intolerance, heat intolerance, polydipsia, polyphagia and polyuria.        Breast enlargement    Genitourinary: Negative for difficulty urinating, dysuria, enuresis, flank pain, frequency, hematuria and urgency.        Decreased penile size    Musculoskeletal: Negative for arthralgias, back pain, gait problem, joint swelling, myalgias, neck pain and neck stiffness.   Skin: Negative for color change, pallor and wound.        Softer skin  Decrease body hair   Less sweating   Allergic/Immunologic: Negative for environmental allergies, food allergies and immunocompromised state.   Neurological: Negative for dizziness, tremors, seizures, syncope, facial asymmetry, speech difficulty, weakness, light-headedness, numbness and headaches.   Hematological: Negative for adenopathy. Does not bruise/bleed easily.   Psychiatric/Behavioral: Negative for agitation, behavioral problems, confusion, decreased concentration, dysphoric mood, hallucinations, self-injury, sleep disturbance and suicidal ideas. The patient is not nervous/anxious and is not hyperactive.         Objective:     /72 (BP Location: Left arm, Patient Position: Sitting, Cuff Size: Large Adult)   Pulse 87   Ht 157.5 cm (62\")   Wt 113 kg (249 lb 9.6 oz)   SpO2 97%   BMI 45.65 kg/m²     Physical Exam "   Constitutional: He is oriented to person, place, and time. He appears well-developed and well-nourished. He is cooperative.   HENT:   Head: Normocephalic and atraumatic.   Right Ear: External ear normal.   Left Ear: External ear normal.   Nose: Nose normal.   Mouth/Throat: Oropharynx is clear and moist. No oropharyngeal exudate.   Eyes: Pupils are equal, round, and reactive to light. Conjunctivae and EOM are normal. No scleral icterus. Right eye exhibits normal extraocular motion. Left eye exhibits normal extraocular motion.   Neck: Neck supple. No JVD present. No muscular tenderness present. No tracheal deviation, no edema and no erythema present. No thyromegaly present.   Cardiovascular: Normal rate, regular rhythm, normal heart sounds and intact distal pulses.  Exam reveals no gallop and no friction rub.    No murmur heard.  Pulmonary/Chest: Effort normal and breath sounds normal. No stridor. No respiratory distress. He has no decreased breath sounds. He has no wheezes. He has no rhonchi. He has no rales. He exhibits no tenderness.   Abdominal: Soft. Bowel sounds are normal. He exhibits no distension and no mass. There is no hepatomegaly. There is no tenderness. There is no rebound and no guarding. No hernia.   Musculoskeletal: Normal range of motion. He exhibits no edema, tenderness or deformity.     Vascular Status -  His right foot exhibits normal foot vasculature . His left foot exhibits normal foot vasculature .  Lymphadenopathy:     He has no cervical adenopathy.   Neurological: He is alert and oriented to person, place, and time. He has normal reflexes. No cranial nerve deficit. He exhibits normal muscle tone. Coordination normal.   Skin: Skin is warm. No rash noted. No erythema. No pallor.   Psychiatric: He has a normal mood and affect. His behavior is normal. Judgment and thought content normal.   Nursing note and vitals reviewed.      Lab Review            Assessment/Plan       ICD-10-CM ICD-9-CM   1.  Male-to-female transgender person F64.0 302.85   2. Hypogonadism in male E29.1 257.2   3. Essential hypertension I10 401.9   4. Morbid obesity due to excess calories (CMS/[facility]) E66.01 278.01   5. Vitamin D deficiency E55.9 268.9            Transgender Male to Female    Male Hypogonadism       baseline prolactin ( nl ), estradiol (17)  testosterone ( 378)    Estradiol 2 x 2  mg every day change to 6 mg daily     no aldactone due to ARF on HCTZ     finasteride 5 mg daily     Now    Component      Latest Ref Rng 5/19/2016 7/19/2016   Testosterone, Total      348 - 1197 ng/dL 378 180 (L)     Component      Latest Ref Rng 5/19/2016 7/19/2016   Estradiol      8.0 - 35.0 pg/mL 17.6 358.0 (H)       Lab Results   Component Value Date    ESTRADIOL 161.4 (H) 07/20/2018    ESTRADIOL 140.2 (H) 08/11/2017    ESTRADIOL 54.7 (H) 02/24/2017     Lab Results   Component Value Date    TESTOSTERONE 664.9 07/20/2018    TESTOSTERONE 623.0 08/11/2017     Lab Results   Component Value Date    TESTOSTEROTT 584 02/24/2017    TESTOSTEROTT 408 10/19/2016    TESTOSTEROTT 180 (L) 07/19/2016       Presently not interested in surgery     rtc in 3 months    We spoke about progesterone  We researched in office and no added benefit with introduction of risk of CV disease        Renal Failure     Lab Results   Component Value Date    HGBA1C 5.0 08/11/2017    HGBA1C 5.3 07/19/2016    HGBA1C 5.3 07/08/2015     Lab Results   Component Value Date    GLUCOSE 110 (H) 07/20/2018    BUN 16 07/20/2018    CREATININE 1.31 (H) 07/20/2018    EGFRIFNONA 57 07/20/2018    BCR 12.2 07/20/2018    CO2 26.0 07/20/2018    CALCIUM 9.8 07/20/2018    ALBUMIN 4.30 07/20/2018    AST 21 07/20/2018    ALT 20 07/20/2018     Lab Results   Component Value Date    WBC 14.36 (H) 07/20/2018    HGB 14.8 07/20/2018    HCT 42.7 07/20/2018    MCV 91.0 07/20/2018     07/20/2018           Lipid Management  Lab Results   Component Value Date    CHOL 176 08/11/2017     Lab Results    Component Value Date    TRIG 67 08/11/2017    TRIG 89 07/19/2016     Lab Results   Component Value Date    HDL 56 08/11/2017    HDL 57 (L) 07/19/2016     No components found for: LDLCALC    Blood Pressure Management:    Vitals:    07/26/18 1131   BP: 126/72   Pulse: 87   SpO2: 97%       On atenolol 50 mg daily   imdur 30 mg er daily , had chest pain , cath nl around 2014 , started him on imdur  On losartan - hctz    Having hypokalemia, we increased K to 1 tabs per day     Preventive Care:      Not a smoker     Weight Related:   Wt Readings from Last 3 Encounters:   07/26/18 113 kg (249 lb 9.6 oz)   07/09/18 111 kg (245 lb)   12/29/17 107 kg (235 lb)     Body mass index is 45.65 kg/m².    Advised to consume 500 calories less per day    Exercise 30 min daily       Lost 15 lbs initially , now 6 more     Bone Health    Lab Results   Component Value Date    CALCIUM 9.8 07/20/2018     Lab Results   Component Value Date    EWOV90YN 33.0 02/24/2017    VPSJ25TB 52.5 10/19/2016         Reassess vit d    Thyroid Health  No results found for: TSH      Lab Results   Component Value Date    VPVZGXKG03 323 02/24/2017             I reviewed and summarized records from JOHNNY Sawant from 2016 and I reviewed / ordered labs.     No orders of the defined types were placed in this encounter.        A copy of my note was sent to JOHNNY Sawant    Please see my above opinion and suggestions.

## 2018-08-10 ENCOUNTER — LAB (OUTPATIENT)
Dept: LAB | Facility: OTHER | Age: 53
End: 2018-08-10

## 2018-08-10 ENCOUNTER — OFFICE VISIT (OUTPATIENT)
Dept: FAMILY MEDICINE CLINIC | Facility: CLINIC | Age: 53
End: 2018-08-10

## 2018-08-10 VITALS
BODY MASS INDEX: 45.78 KG/M2 | SYSTOLIC BLOOD PRESSURE: 134 MMHG | WEIGHT: 248.8 LBS | HEIGHT: 62 IN | TEMPERATURE: 97.4 F | OXYGEN SATURATION: 98 % | HEART RATE: 80 BPM | DIASTOLIC BLOOD PRESSURE: 80 MMHG

## 2018-08-10 DIAGNOSIS — R39.15 URINARY URGENCY: ICD-10-CM

## 2018-08-10 DIAGNOSIS — R39.14 FEELING OF INCOMPLETE BLADDER EMPTYING: ICD-10-CM

## 2018-08-10 DIAGNOSIS — R53.83 OTHER FATIGUE: Primary | ICD-10-CM

## 2018-08-10 LAB
BILIRUB UR QL STRIP: NEGATIVE
CLARITY UR: CLEAR
COLOR UR: YELLOW
GLUCOSE UR STRIP-MCNC: NEGATIVE MG/DL
HGB UR QL STRIP.AUTO: NEGATIVE
KETONES UR QL STRIP: NEGATIVE
LEUKOCYTE ESTERASE UR QL STRIP.AUTO: NEGATIVE
NITRITE UR QL STRIP: NEGATIVE
PH UR STRIP.AUTO: <=5 [PH] (ref 5.5–8)
PROT UR QL STRIP: NEGATIVE
SP GR UR STRIP: 1.02 (ref 1–1.03)
UROBILINOGEN UR QL STRIP: ABNORMAL

## 2018-08-10 PROCEDURE — 96372 THER/PROPH/DIAG INJ SC/IM: CPT | Performed by: NURSE PRACTITIONER

## 2018-08-10 PROCEDURE — 81003 URINALYSIS AUTO W/O SCOPE: CPT | Performed by: NURSE PRACTITIONER

## 2018-08-10 PROCEDURE — 99214 OFFICE O/P EST MOD 30 MIN: CPT | Performed by: NURSE PRACTITIONER

## 2018-08-10 RX ORDER — CYANOCOBALAMIN 1000 UG/ML
1000 INJECTION, SOLUTION INTRAMUSCULAR; SUBCUTANEOUS
Status: DISCONTINUED | OUTPATIENT
Start: 2018-08-10 | End: 2021-01-08

## 2018-08-10 RX ADMIN — CYANOCOBALAMIN 1000 MCG: 1000 INJECTION, SOLUTION INTRAMUSCULAR; SUBCUTANEOUS at 16:11

## 2018-08-26 PROBLEM — R39.15 URINARY URGENCY: Status: ACTIVE | Noted: 2018-08-26

## 2018-08-26 PROBLEM — R53.83 OTHER FATIGUE: Status: ACTIVE | Noted: 2018-08-26

## 2018-08-27 NOTE — PROGRESS NOTES
Subjective   Rupesh Wilbert Valencia Jr is a 53 y.o. male who presents to the office complaining of trouble emptying his bladder.  After he urinates he feels like he is retaining.  Is in the transgender process.  Has been seeing Dr. Lake but no recent PCP.  History of Present Illness     The following portions of the patient's history were reviewed and updated as appropriate: allergies, current medications, past family history, past medical history, past social history, past surgical history and problem list.    Review of Systems   Constitutional: Negative for chills, fatigue and fever.   HENT: Negative for congestion, sneezing, sore throat and trouble swallowing.    Eyes: Negative for visual disturbance.   Respiratory: Negative for cough, chest tightness, shortness of breath and wheezing.    Cardiovascular: Negative for chest pain, palpitations and leg swelling.   Gastrointestinal: Negative for abdominal pain, constipation, diarrhea, nausea and vomiting.   Genitourinary: Negative for dysuria, frequency and urgency.        Trouble emptying bladder    Musculoskeletal: Negative for neck pain.   Skin: Negative for rash.   Neurological: Negative for dizziness, weakness and headaches.   Psychiatric/Behavioral:        In the past two weeks the pt has not felt down, depressed, hopeless or lost interest in doing things   All other systems reviewed and are negative.      Objective   Physical Exam   Constitutional: He is oriented to person, place, and time. He appears well-developed and well-nourished. He is cooperative.  Non-toxic appearance. He does not have a sickly appearance. He does not appear ill.   HENT:   Head: Normocephalic and atraumatic.   Right Ear: External ear normal.   Left Ear: External ear normal.   Nose: Nose normal.   Mouth/Throat: Uvula is midline, oropharynx is clear and moist and mucous membranes are normal.   Eyes: Pupils are equal, round, and reactive to light. Conjunctivae, EOM and lids are normal.  Right eye exhibits no discharge. Left eye exhibits no discharge. No scleral icterus.   Neck: Normal range of motion. Neck supple. No thyromegaly present.   Cardiovascular: Normal rate, regular rhythm, normal heart sounds and intact distal pulses.  Exam reveals no gallop and no friction rub.    No murmur heard.  Pulmonary/Chest: Effort normal and breath sounds normal. No respiratory distress. He has no wheezes. He has no rales.   Abdominal: Soft. Normal appearance and bowel sounds are normal. He exhibits no distension. There is no tenderness. There is no rebound and no guarding.   Musculoskeletal: Normal range of motion. He exhibits no edema.   Lymphadenopathy:     He has no cervical adenopathy.   Neurological: He is alert and oriented to person, place, and time. No cranial nerve deficit. GCS eye subscore is 4. GCS verbal subscore is 5. GCS motor subscore is 6.   Skin: Skin is warm and dry. No rash noted.   Psychiatric: He has a normal mood and affect. His behavior is normal. Judgment and thought content normal.   Nursing note and vitals reviewed.      Assessment/Plan   Rupesh was seen today for follow-up.    Diagnoses and all orders for this visit:    Other fatigue  -     cyanocobalamin injection 1,000 mcg; Inject 1 mL into the appropriate muscle as directed by prescriber Every 28 (Twenty-Eight) Days.    Urinary urgency  -     Urinalysis With Culture If Indicated - Urine, Clean Catch; Future  -     US Pelvis Limited    Feeling of incomplete bladder emptying  -     US Pelvis Limited    Encouraged no caffeine and to limit drinks at 5 pm.    Sit down when voiding.    Will eval diagnostic imaging.

## 2018-08-30 DIAGNOSIS — I10 ESSENTIAL HYPERTENSION: ICD-10-CM

## 2018-08-30 DIAGNOSIS — F32.A DEPRESSION, UNSPECIFIED DEPRESSION TYPE: ICD-10-CM

## 2018-08-30 RX ORDER — LOSARTAN POTASSIUM AND HYDROCHLOROTHIAZIDE 12.5; 5 MG/1; MG/1
1 TABLET ORAL DAILY
Qty: 30 TABLET | Refills: 5 | Status: SHIPPED | OUTPATIENT
Start: 2018-08-30 | End: 2019-01-30 | Stop reason: SDUPTHER

## 2018-08-30 RX ORDER — FLUOXETINE HYDROCHLORIDE 20 MG/1
20 CAPSULE ORAL DAILY
Qty: 30 CAPSULE | Refills: 5 | Status: SHIPPED | OUTPATIENT
Start: 2018-08-30 | End: 2019-01-31 | Stop reason: SDUPTHER

## 2018-08-30 RX ORDER — AMITRIPTYLINE HYDROCHLORIDE 50 MG/1
TABLET, FILM COATED ORAL
Qty: 60 TABLET | Refills: 5 | Status: SHIPPED | OUTPATIENT
Start: 2018-08-30 | End: 2019-01-31 | Stop reason: SDUPTHER

## 2018-08-30 NOTE — TELEPHONE ENCOUNTER
Refill amitriptyline prozac and losartan-hctz sent to [facility] pharmacy

## 2018-09-07 RX ORDER — CYANOCOBALAMIN 1000 UG/ML
1000 INJECTION, SOLUTION INTRAMUSCULAR; SUBCUTANEOUS ONCE
Qty: 1 ML | Refills: 6 | Status: SHIPPED | OUTPATIENT
Start: 2018-09-07 | End: 2019-04-05 | Stop reason: SDUPTHER

## 2018-11-06 ENCOUNTER — CLINICAL SUPPORT (OUTPATIENT)
Dept: FAMILY MEDICINE CLINIC | Facility: CLINIC | Age: 53
End: 2018-11-06

## 2018-11-06 VITALS
SYSTOLIC BLOOD PRESSURE: 138 MMHG | HEART RATE: 89 BPM | WEIGHT: 260.8 LBS | HEIGHT: 62 IN | DIASTOLIC BLOOD PRESSURE: 82 MMHG | BODY MASS INDEX: 47.99 KG/M2

## 2018-11-06 DIAGNOSIS — Z02.89 ENCOUNTER FOR EXAMINATION REQUIRED BY DEPARTMENT OF TRANSPORTATION (DOT): Primary | ICD-10-CM

## 2018-11-06 PROCEDURE — DOTPHY: Performed by: NURSE PRACTITIONER

## 2018-11-06 NOTE — PROGRESS NOTES
Rupesh Valencia Jr is a 53 y.o. male who presents to the office for a DOT Exam. See [employer] DOT examination form for details of this visit.

## 2018-11-27 ENCOUNTER — OFFICE VISIT (OUTPATIENT)
Dept: FAMILY MEDICINE CLINIC | Facility: CLINIC | Age: 53
End: 2018-11-27

## 2018-11-27 VITALS
WEIGHT: 267 LBS | BODY MASS INDEX: 49.13 KG/M2 | TEMPERATURE: 97.2 F | HEART RATE: 86 BPM | DIASTOLIC BLOOD PRESSURE: 80 MMHG | HEIGHT: 62 IN | SYSTOLIC BLOOD PRESSURE: 132 MMHG | OXYGEN SATURATION: 94 %

## 2018-11-27 DIAGNOSIS — H93.19 TINNITUS, UNSPECIFIED LATERALITY: ICD-10-CM

## 2018-11-27 DIAGNOSIS — J30.9 ALLERGIC RHINITIS, UNSPECIFIED SEASONALITY, UNSPECIFIED TRIGGER: Primary | ICD-10-CM

## 2018-11-27 PROCEDURE — 99213 OFFICE O/P EST LOW 20 MIN: CPT | Performed by: NURSE PRACTITIONER

## 2018-11-27 RX ORDER — PREDNISONE 10 MG/1
10 TABLET ORAL DAILY
Qty: 7 TABLET | Refills: 0 | Status: SHIPPED | OUTPATIENT
Start: 2018-11-27 | End: 2019-06-27

## 2018-11-27 RX ORDER — FLUTICASONE PROPIONATE 50 MCG
1 SPRAY, SUSPENSION (ML) NASAL DAILY
Qty: 1 BOTTLE | Refills: 0 | Status: SHIPPED | OUTPATIENT
Start: 2018-11-27 | End: 2019-11-14

## 2018-12-06 PROBLEM — J30.9 ALLERGIC RHINITIS: Status: ACTIVE | Noted: 2018-12-06

## 2018-12-06 PROBLEM — H93.19 TINNITUS: Status: ACTIVE | Noted: 2018-12-06

## 2018-12-06 NOTE — PROGRESS NOTES
Subjective   Rupesh Wilbert Valencia Jr is a 53 y.o. male who presents to the office complaining of sinus congestion and ringing in his ears for one week.  Denies fever.  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  History of Present Illness     The following portions of the patient's history were reviewed and updated as appropriate: allergies, current medications, past family history, past medical history, past social history, past surgical history and problem list.    Review of Systems   Constitutional: Negative for chills, fatigue and fever.   HENT: Positive for rhinorrhea, sinus pressure and tinnitus. Negative for congestion, sneezing, sore throat and trouble swallowing.    Eyes: Negative for visual disturbance.   Respiratory: Negative for cough, chest tightness, shortness of breath and wheezing.    Cardiovascular: Negative for chest pain, palpitations and leg swelling.   Gastrointestinal: Negative for abdominal pain, constipation, diarrhea, nausea and vomiting.   Genitourinary: Negative for dysuria, frequency and urgency.   Musculoskeletal: Negative for neck pain.   Skin: Negative for rash.   Neurological: Negative for dizziness, weakness and headaches.   Psychiatric/Behavioral:        In the past two weeks the pt has not felt down, depressed, hopeless or lost interest in doing things   All other systems reviewed and are negative.      Objective   Physical Exam   Constitutional: He is oriented to person, place, and time. He appears well-developed and well-nourished. He is cooperative.  Non-toxic appearance. He does not have a sickly appearance. He appears ill (chronically).   HENT:   Head: Normocephalic and atraumatic.   Right Ear: External ear normal.   Left Ear: External ear normal.   Nose: Mucosal edema and rhinorrhea present.   Mouth/Throat: Uvula is midline, oropharynx is clear and moist and mucous membranes are normal.   Eyes: Conjunctivae, EOM and lids are normal. Pupils are equal, round,  and reactive to light. Right eye exhibits no discharge. Left eye exhibits no discharge. No scleral icterus.   Neck: Trachea normal, normal range of motion and phonation normal. Neck supple. No thyromegaly present.   Cardiovascular: Normal rate, regular rhythm, normal heart sounds and intact distal pulses. Exam reveals no gallop and no friction rub.   No murmur heard.  Pulmonary/Chest: Effort normal and breath sounds normal. No respiratory distress. He has no wheezes. He has no rales.   Abdominal: Soft. Bowel sounds are normal. He exhibits no distension. There is no tenderness. There is no rebound and no guarding.   Musculoskeletal: Normal range of motion. He exhibits no edema.   Lymphadenopathy:     He has no cervical adenopathy.   Neurological: He is alert and oriented to person, place, and time. No cranial nerve deficit. GCS eye subscore is 4. GCS verbal subscore is 5. GCS motor subscore is 6.   Skin: Skin is warm, dry and intact. No rash noted.   Psychiatric: He has a normal mood and affect. His behavior is normal. Judgment and thought content normal.   Nursing note and vitals reviewed.      Assessment/Plan   Rupesh was seen today for tinnitus and nasal congestion.    Diagnoses and all orders for this visit:    Allergic rhinitis, unspecified seasonality, unspecified trigger    Tinnitus, unspecified laterality    Other orders  -     predniSONE (DELTASONE) 10 MG tablet; Take 1 tablet by mouth Daily.  -     loratadine-pseudoephedrine (CLARITIN-D 12 HOUR) 5-120 MG per 12 hr tablet; Take 1 tablet by mouth Every Morning.  -     fluticasone (FLONASE) 50 MCG/ACT nasal spray; 1 spray into the nostril(s) as directed by provider Daily. Spray in each nostril.    Encouraged cough and deep breathing.  No smoke exposure.  Good handwashing.  Will take Claritin D no more than five days.  RTC if tinnitus continues.

## 2019-01-02 DIAGNOSIS — I10 ESSENTIAL HYPERTENSION: Primary | ICD-10-CM

## 2019-01-02 RX ORDER — ATENOLOL 25 MG/1
50 TABLET ORAL DAILY
Qty: 60 TABLET | Refills: 5 | Status: SHIPPED | OUTPATIENT
Start: 2019-01-02 | End: 2019-06-27 | Stop reason: SDUPTHER

## 2019-01-30 DIAGNOSIS — I10 ESSENTIAL HYPERTENSION: ICD-10-CM

## 2019-01-30 DIAGNOSIS — F32.A DEPRESSION, UNSPECIFIED DEPRESSION TYPE: ICD-10-CM

## 2019-01-31 RX ORDER — LOSARTAN POTASSIUM AND HYDROCHLOROTHIAZIDE 12.5; 5 MG/1; MG/1
1 TABLET ORAL DAILY
Qty: 30 TABLET | Refills: 5 | Status: SHIPPED | OUTPATIENT
Start: 2019-01-31 | End: 2019-11-25 | Stop reason: SINTOL

## 2019-01-31 RX ORDER — FLUOXETINE HYDROCHLORIDE 20 MG/1
20 CAPSULE ORAL DAILY
Qty: 30 CAPSULE | Refills: 5 | Status: SHIPPED | OUTPATIENT
Start: 2019-01-31 | End: 2022-01-14

## 2019-01-31 RX ORDER — AMITRIPTYLINE HYDROCHLORIDE 50 MG/1
TABLET, FILM COATED ORAL
Qty: 60 TABLET | Refills: 5 | Status: SHIPPED | OUTPATIENT
Start: 2019-01-31 | End: 2019-11-25 | Stop reason: ALTCHOICE

## 2019-04-05 RX ORDER — TOPIRAMATE 100 MG/1
CAPSULE, EXTENDED RELEASE ORAL
Qty: 30 CAPSULE | Refills: 5 | OUTPATIENT
Start: 2019-04-05

## 2019-04-05 RX ORDER — CYANOCOBALAMIN 1000 UG/ML
1000 INJECTION, SOLUTION INTRAMUSCULAR; SUBCUTANEOUS ONCE
Qty: 1 ML | Refills: 6 | Status: SHIPPED | OUTPATIENT
Start: 2019-04-05 | End: 2019-04-05

## 2019-06-27 ENCOUNTER — OFFICE VISIT (OUTPATIENT)
Dept: ENDOCRINOLOGY | Facility: CLINIC | Age: 54
End: 2019-06-27

## 2019-06-27 VITALS
WEIGHT: 260.8 LBS | HEART RATE: 84 BPM | OXYGEN SATURATION: 98 % | HEIGHT: 62 IN | DIASTOLIC BLOOD PRESSURE: 78 MMHG | BODY MASS INDEX: 47.99 KG/M2 | SYSTOLIC BLOOD PRESSURE: 130 MMHG

## 2019-06-27 DIAGNOSIS — I10 ESSENTIAL HYPERTENSION: ICD-10-CM

## 2019-06-27 DIAGNOSIS — Z78.9 MALE-TO-FEMALE TRANSGENDER PERSON: Primary | ICD-10-CM

## 2019-06-27 DIAGNOSIS — E29.1 HYPOGONADISM IN MALE: ICD-10-CM

## 2019-06-27 DIAGNOSIS — E55.9 VITAMIN D DEFICIENCY: ICD-10-CM

## 2019-06-27 DIAGNOSIS — N18.30 CKD (CHRONIC KIDNEY DISEASE) STAGE 3, GFR 30-59 ML/MIN (HCC): ICD-10-CM

## 2019-06-27 PROCEDURE — 99214 OFFICE O/P EST MOD 30 MIN: CPT | Performed by: INTERNAL MEDICINE

## 2019-06-27 RX ORDER — ESTRADIOL 2 MG/1
8 TABLET ORAL DAILY
Qty: 120 TABLET | Refills: 11 | Status: SHIPPED | OUTPATIENT
Start: 2019-06-27 | End: 2020-06-19 | Stop reason: SDUPTHER

## 2019-06-27 RX ORDER — TOPIRAMATE 100 MG/1
CAPSULE, EXTENDED RELEASE ORAL
Qty: 30 CAPSULE | Refills: 5 | Status: SHIPPED | OUTPATIENT
Start: 2019-06-27 | End: 2019-06-27

## 2019-06-27 RX ORDER — FINASTERIDE 5 MG/1
5 TABLET, FILM COATED ORAL DAILY
Qty: 30 TABLET | Refills: 11 | Status: SHIPPED | OUTPATIENT
Start: 2019-06-27 | End: 2020-07-29

## 2019-06-27 NOTE — PROGRESS NOTES
Rupesh Valencia Jr is a 54 y.o. male who presents for  evaluation of transgender MTF        Primary Care / Referring Provider  Samia Gonzalez APRN     followup     reason , transgender Male to Female    duration, since about 5 years ago     timing, constant. She recognized that he suppressed this desire but now she no longer wants to suppress it     quality, presently controlled    previous treatment - she took OCP, herbs  presently using makeup   responding to estradiol  aldactone not given due to ARF but on finasteride         2016 nl PSA     I have psychiatrist letter of support 10-16 and sent for scanning     Past Medical History:   Diagnosis Date   • Acute sinusitis    • Benign essential hypertension    • Carpal tunnel syndrome    • Chest pain    • Chronic sinusitis    • Cough    • Diabetes mellitus without complication (CMS/HCC)     type II or unspecified type, not stated as uncontrolled      • Diarrhea of presumed infectious origin    • Dyspnea    • Dyspnea on exertion     Angina equivalent      • Dysuria    • Essential hypertension    • Gender dysphoria      transgendered, crossdresses      • Hyperkeratosis    • Hypogonadism in male 2/24/2017   • Impaired fasting blood sugar    • Inflamed seborrheic keratosis    • Male hypogonadism    • Male-to-female transgender person 2/24/2017   • Male-to-female transsexuality    • Migraine    • Morbid obesity due to excess calories (CMS/HCC) 2/24/2017   • Obesity    • Pain in joint     unspecified   • Poisoning due to venomous spider    • Renal impairment    • Routine general medical examination at a health care facility    • Special screening for malignant neoplasm of prostate    • Vitamin D deficiency 2/24/2017     Family History   Problem Relation Age of Onset   • Breast cancer Mother    • Diabetes Mother    • Diabetes Father    • Prostate cancer Father    • Skin cancer Father      Social History     Tobacco Use   • Smoking status: Never Smoker   • Smokeless  "tobacco: Never Used   Substance Use Topics   • Alcohol use: Yes     Comment: occasional   • Drug use: No         Current Outpatient Medications:   •  amitriptyline (ELAVIL) 50 MG tablet, 1 OR 2 TABLETS BY MOUTH AT BEDTIME AS NEEDED, Disp: 60 tablet, Rfl: 5  •  atenolol (TENORMIN) 25 MG tablet, Take 2 tablets by mouth Daily., Disp: 60 tablet, Rfl: 5  •  estradiol (ESTRACE) 2 MG tablet, Take 3 tablets by mouth Daily., Disp: 90 tablet, Rfl: 11  •  finasteride (PROSCAR) 5 MG tablet, Take 1 tablet by mouth Daily., Disp: 30 tablet, Rfl: 11  •  FLUoxetine (PROzac) 20 MG capsule, TAKE 1 CAPSULE BY MOUTH DAILY., Disp: 30 capsule, Rfl: 5  •  fluticasone (FLONASE) 50 MCG/ACT nasal spray, 1 spray into the nostril(s) as directed by provider Daily. Spray in each nostril., Disp: 1 bottle, Rfl: 0  •  isosorbide mononitrate (IMDUR) 30 MG 24 hr tablet, TAKE 1 TABLET BY MOUTH EVERY DAY, Disp: 30 tablet, Rfl: 6  •  loratadine-pseudoephedrine (CLARITIN-D 12 HOUR) 5-120 MG per 12 hr tablet, Take 1 tablet by mouth Every Morning., Disp: 5 tablet, Rfl: 0  •  losartan-hydrochlorothiazide (HYZAAR) 50-12.5 MG per tablet, TAKE 1 TABLET BY MOUTH DAILY., Disp: 30 tablet, Rfl: 5  •  mupirocin (BACTROBAN) 2 % ointment, Apply  topically 2 (Two) Times a Day., Disp: 30 g, Rfl: 5  •  nystatin (MYCOSTATIN) 580098 UNIT/GM powder, APPLY TOPICALLY 2 (TWO) TIMES A DAY., Disp: 45 g, Rfl: 0  •  potassium chloride (K-DUR,KLOR-CON) 20 MEQ CR tablet, One tab daily, Disp: 30 tablet, Rfl: 11  •  Syringe/Needle, Disp, (SYRINGE LUER LOCK) 25G X 1\" 3 ML misc, 1 application Every 30 (Thirty) Days., Disp: 50 each, Rfl: 12  •  tamsulosin (FLOMAX) 0.4 MG capsule 24 hr capsule, TAKE 1 CAPSULE BY MOUTH DAILY., Disp: 30 capsule, Rfl: 2  •  Topiramate ER (TROKENDI XR) 100 MG capsule sustained-release 24 hr, TAKE 1 BY MOUTH DAILY, Disp: 30 capsule, Rfl: 5  •  triamcinolone (KENALOG) 0.5 % cream, Apply  topically 3 (Three) Times a Day. To affected area until healed., Disp: 80 " g, Rfl: 1  •  predniSONE (DELTASONE) 10 MG tablet, Take 1 tablet by mouth Daily., Disp: 7 tablet, Rfl: 0    Current Facility-Administered Medications:   •  cyanocobalamin injection 1,000 mcg, 1,000 mcg, Intramuscular, Q28 Days, Samia Gonzalez, JOHNNY, 1,000 mcg at 08/10/18 1611    Review of Systems    Review of Systems   Constitutional: Positive for fatigue. Negative for activity change, appetite change, chills, diaphoresis, fever and unexpected weight change.   HENT: Negative for congestion, drooling, ear discharge, ear pain, facial swelling, mouth sores, nosebleeds, postnasal drip, rhinorrhea, sinus pressure, sneezing, sore throat, tinnitus, trouble swallowing and voice change.    Eyes: Negative for photophobia, pain, discharge, redness, itching and visual disturbance.   Respiratory: Negative for apnea, cough, choking, chest tightness, shortness of breath, wheezing and stridor.    Cardiovascular: Negative for chest pain, palpitations and leg swelling.   Gastrointestinal: Negative for abdominal distention, abdominal pain, anal bleeding, blood in stool, constipation, diarrhea, nausea, rectal pain and vomiting.   Endocrine: Negative for cold intolerance, heat intolerance, polydipsia, polyphagia and polyuria.        Breast enlargement    Genitourinary: Negative for difficulty urinating, dysuria, enuresis, flank pain, frequency, hematuria and urgency.        Decreased penile size    Musculoskeletal: Negative for arthralgias, back pain, gait problem, joint swelling, myalgias, neck pain and neck stiffness.   Skin: Negative for color change, pallor and wound.        Softer skin  Decrease body hair   Less sweating   Allergic/Immunologic: Negative for environmental allergies, food allergies and immunocompromised state.   Neurological: Negative for dizziness, tremors, seizures, syncope, facial asymmetry, speech difficulty, weakness, light-headedness, numbness and headaches.   Hematological: Negative for adenopathy. Does not  "bruise/bleed easily.   Psychiatric/Behavioral: Negative for agitation, behavioral problems, confusion, decreased concentration, dysphoric mood, hallucinations, self-injury, sleep disturbance and suicidal ideas. The patient is not nervous/anxious and is not hyperactive.         Objective:     /78   Pulse 84   Ht 157.5 cm (62\")   Wt 118 kg (260 lb 12.8 oz)   SpO2 98%   BMI 47.70 kg/m²     Physical Exam   Constitutional: He is oriented to person, place, and time. He appears well-developed and well-nourished. He is cooperative.   HENT:   Head: Normocephalic and atraumatic.   Right Ear: External ear normal.   Left Ear: External ear normal.   Nose: Nose normal.   Mouth/Throat: Oropharynx is clear and moist. No oropharyngeal exudate.   Eyes: Conjunctivae and EOM are normal. Pupils are equal, round, and reactive to light. No scleral icterus. Right eye exhibits normal extraocular motion. Left eye exhibits normal extraocular motion.   Neck: Neck supple. No JVD present. No muscular tenderness present. No tracheal deviation, no edema and no erythema present. No thyromegaly present.   Cardiovascular: Normal rate, regular rhythm, normal heart sounds and intact distal pulses. Exam reveals no gallop and no friction rub.   No murmur heard.  Pulmonary/Chest: Effort normal and breath sounds normal. No stridor. No respiratory distress. He has no decreased breath sounds. He has no wheezes. He has no rhonchi. He has no rales. He exhibits no tenderness.   Abdominal: Soft. Bowel sounds are normal. He exhibits no distension and no mass. There is no hepatomegaly. There is no tenderness. There is no rebound and no guarding. No hernia.   Musculoskeletal: Normal range of motion. He exhibits no edema, tenderness or deformity.     Vascular Status -  His right foot exhibits normal foot vasculature . His left foot exhibits normal foot vasculature .  Lymphadenopathy:     He has no cervical adenopathy.   Neurological: He is alert and " oriented to person, place, and time. He has normal reflexes. No cranial nerve deficit. He exhibits normal muscle tone. Coordination normal.   Skin: Skin is warm. No rash noted. No erythema. No pallor.   Psychiatric: He has a normal mood and affect. His behavior is normal. Judgment and thought content normal.   Nursing note and vitals reviewed.      Lab Review            Assessment/Plan       ICD-10-CM ICD-9-CM   1. Male-to-female transgender person F64.0 302.85   2. Hypogonadism in male E29.1 257.2   3. Essential hypertension I10 401.9   4. Vitamin D deficiency E55.9 268.9   5. Prostatism N40.0 600.90   6. CKD (chronic kidney disease) stage 3, GFR 30-59 ml/min (CMS/HCC) N18.3 585.3            Transgender Male to Female    Male Hypogonadism       baseline prolactin ( nl ), estradiol (17)  testosterone ( 378)    Estradiol 2 x 2  mg every day change to 6 mg daily     no aldactone due to ARF on HCTZ     finasteride 5 mg daily     Now    Component      Latest Ref Rng 5/19/2016 7/19/2016   Testosterone, Total      348 - 1197 ng/dL 378 180 (L)     Component      Latest Ref Rng 5/19/2016 7/19/2016   Estradiol      8.0 - 35.0 pg/mL 17.6 358.0 (H)       Lab Results   Component Value Date    ESTRADIOL 161.4 (H) 07/20/2018    ESTRADIOL 140.2 (H) 08/11/2017    ESTRADIOL 54.7 (H) 02/24/2017     Lab Results   Component Value Date    TESTOSTERONE 664.9 07/20/2018    TESTOSTERONE 623.0 08/11/2017     Lab Results   Component Value Date    TESTOSTEROTT 584 02/24/2017    TESTOSTEROTT 408 10/19/2016    TESTOSTEROTT 180 (L) 07/19/2016       Presently not interested in surgery     rtc in 3 months    We spoke about progesterone  We researched in office and no added benefit with introduction of risk of CV disease        Renal Failure     Lab Results   Component Value Date    HGBA1C 5.0 08/11/2017    HGBA1C 5.3 07/19/2016    HGBA1C 5.3 07/08/2015     Lab Results   Component Value Date    GLUCOSE 110 (H) 07/20/2018    BUN 16 07/20/2018     CREATININE 1.31 (H) 07/20/2018    EGFRIFNONA 57 07/20/2018    BCR 12.2 07/20/2018    CO2 26.0 07/20/2018    CALCIUM 9.8 07/20/2018    ALBUMIN 4.30 07/20/2018    AST 21 07/20/2018    ALT 20 07/20/2018     Lab Results   Component Value Date    WBC 14.36 (H) 07/20/2018    HGB 14.8 07/20/2018    HCT 42.7 07/20/2018    MCV 91.0 07/20/2018     07/20/2018           Lipid Management  Lab Results   Component Value Date    CHOL 176 08/11/2017     Lab Results   Component Value Date    TRIG 67 08/11/2017    TRIG 89 07/19/2016     Lab Results   Component Value Date    HDL 56 08/11/2017    HDL 57 (L) 07/19/2016     No components found for: LDLCALC    Blood Pressure Management:    Vitals:    06/27/19 1641   BP: 130/78   Pulse: 84   SpO2: 98%       On atenolol 50 mg daily   imdur 30 mg er daily , had chest pain , cath nl around 2014 , started him on imdur  On losartan - hctz    Having hypokalemia, we increased K to 1 tabs per day     Preventive Care:      Not a smoker     Weight Related:   Wt Readings from Last 3 Encounters:   06/27/19 118 kg (260 lb 12.8 oz)   11/27/18 121 kg (267 lb)   11/06/18 118 kg (260 lb 12.8 oz)     Body mass index is 47.7 kg/m².    Advised to consume 500 calories less per day    Exercise 30 min daily       Lost 15 lbs initially , now 6 more     Bone Health    Lab Results   Component Value Date    CALCIUM 9.8 07/20/2018     Lab Results   Component Value Date    AKSA83PE 33.0 02/24/2017    RYSN76HM 52.5 10/19/2016         Reassess vit d    Thyroid Health  No results found for: TSH      Lab Results   Component Value Date    [id number] 323 02/24/2017             I reviewed and summarized records from Samia Gonzalez APRN from 2016 and I reviewed / ordered labs.     No orders of the defined types were placed in this encounter.        A copy of my note was sent to Samia Gonzalez APRN    Please see my above opinion and suggestions.

## 2019-07-01 LAB
ALBUMIN SERPL-MCNC: 3.9 G/DL (ref 3.5–5)
ALBUMIN/GLOB SERPL: 1.1 G/DL (ref 1.1–1.8)
ALP SERPL-CCNC: 131 U/L (ref 38–126)
ALT SERPL W P-5'-P-CCNC: 17 U/L
ANION GAP SERPL CALCULATED.3IONS-SCNC: 10 MMOL/L (ref 5–15)
ANISOCYTOSIS BLD QL: ABNORMAL
AST SERPL-CCNC: 22 U/L (ref 17–59)
BILIRUB SERPL-MCNC: 0.4 MG/DL (ref 0.2–1.3)
BUN BLD-MCNC: 21 MG/DL (ref 7–23)
BUN/CREAT SERPL: 14.8 (ref 7–25)
CALCIUM SPEC-SCNC: 9.7 MG/DL (ref 8.4–10.2)
CHLORIDE SERPL-SCNC: 105 MMOL/L (ref 101–112)
CO2 SERPL-SCNC: 24 MMOL/L (ref 22–30)
CREAT BLD-MCNC: 1.42 MG/DL (ref 0.7–1.3)
DEPRECATED RDW RBC AUTO: 45.3 FL (ref 37–54)
EOSINOPHIL # BLD MANUAL: 0.49 10*3/MM3 (ref 0–0.4)
EOSINOPHIL NFR BLD MANUAL: 4 % (ref 0.3–6.2)
ERYTHROCYTE [DISTWIDTH] IN BLOOD BY AUTOMATED COUNT: 14.1 % (ref 12.3–15.4)
ESTRADIOL SERPL HS-MCNC: 153 PG/ML
FSH SERPL-ACNC: 3.07 MIU/ML
GFR SERPL CREATININE-BSD FRML MDRD: 52 ML/MIN/1.73 (ref 56–130)
GLOBULIN UR ELPH-MCNC: 3.7 GM/DL (ref 2.3–3.5)
GLUCOSE BLD-MCNC: 122 MG/DL (ref 70–99)
HCT VFR BLD AUTO: 40.8 % (ref 37.5–51)
HGB BLD-MCNC: 14.4 G/DL (ref 13–17.7)
LH SERPL-ACNC: 9.6 MIU/ML
LYMPHOCYTES # BLD MANUAL: 2.68 10*3/MM3 (ref 0.7–3.1)
LYMPHOCYTES NFR BLD MANUAL: 22 % (ref 19.6–45.3)
LYMPHOCYTES NFR BLD MANUAL: 7 % (ref 5–12)
MCH RBC QN AUTO: 31.9 PG (ref 26.6–33)
MCHC RBC AUTO-ENTMCNC: 35.3 G/DL (ref 31.5–35.7)
MCV RBC AUTO: 90.5 FL (ref 79–97)
MONOCYTES # BLD AUTO: 0.85 10*3/MM3 (ref 0.1–0.9)
NEUTROPHILS # BLD AUTO: 8.15 10*3/MM3 (ref 1.7–7)
NEUTROPHILS NFR BLD MANUAL: 67 % (ref 42.7–76)
PLATELET # BLD AUTO: 305 10*3/MM3 (ref 140–450)
PMV BLD AUTO: 9.4 FL (ref 6–12)
POTASSIUM BLD-SCNC: 3.9 MMOL/L (ref 3.4–5)
PROLACTIN SERPL-MCNC: 24.6 NG/ML (ref 4.04–15.2)
PROT SERPL-MCNC: 7.6 G/DL (ref 6.3–8.6)
RBC # BLD AUTO: 4.51 10*6/MM3 (ref 4.14–5.8)
SMALL PLATELETS BLD QL SMEAR: ADEQUATE
SODIUM BLD-SCNC: 139 MMOL/L (ref 137–145)
TESTOST SERPL-MCNC: 182 NG/DL (ref 193–740)
TSH SERPL DL<=0.05 MIU/L-ACNC: 4.83 MIU/ML (ref 0.27–4.2)
WBC MORPH BLD: NORMAL
WBC NRBC COR # BLD: 12.16 10*3/MM3 (ref 3.4–10.8)

## 2019-07-01 PROCEDURE — 84146 ASSAY OF PROLACTIN: CPT | Performed by: INTERNAL MEDICINE

## 2019-07-01 PROCEDURE — 84443 ASSAY THYROID STIM HORMONE: CPT | Performed by: INTERNAL MEDICINE

## 2019-07-01 PROCEDURE — 36415 COLL VENOUS BLD VENIPUNCTURE: CPT | Performed by: INTERNAL MEDICINE

## 2019-07-01 PROCEDURE — 82670 ASSAY OF TOTAL ESTRADIOL: CPT | Performed by: INTERNAL MEDICINE

## 2019-07-01 PROCEDURE — 82642 DIHYDROTESTOSTERONE: CPT | Performed by: INTERNAL MEDICINE

## 2019-07-01 PROCEDURE — 85025 COMPLETE CBC W/AUTO DIFF WBC: CPT | Performed by: INTERNAL MEDICINE

## 2019-07-01 PROCEDURE — 83002 ASSAY OF GONADOTROPIN (LH): CPT | Performed by: INTERNAL MEDICINE

## 2019-07-01 PROCEDURE — 84153 ASSAY OF PSA TOTAL: CPT | Performed by: INTERNAL MEDICINE

## 2019-07-01 PROCEDURE — 80053 COMPREHEN METABOLIC PANEL: CPT | Performed by: INTERNAL MEDICINE

## 2019-07-01 PROCEDURE — 84403 ASSAY OF TOTAL TESTOSTERONE: CPT | Performed by: INTERNAL MEDICINE

## 2019-07-01 PROCEDURE — 83001 ASSAY OF GONADOTROPIN (FSH): CPT | Performed by: INTERNAL MEDICINE

## 2019-07-01 RX ORDER — ATENOLOL 25 MG/1
50 TABLET ORAL DAILY
Qty: 60 TABLET | Refills: 5 | Status: SHIPPED | OUTPATIENT
Start: 2019-07-01 | End: 2020-04-14

## 2019-07-02 DIAGNOSIS — Z78.9 MALE-TO-FEMALE TRANSGENDER PERSON: Primary | ICD-10-CM

## 2019-07-02 DIAGNOSIS — N18.30 CKD (CHRONIC KIDNEY DISEASE) STAGE 3, GFR 30-59 ML/MIN (HCC): ICD-10-CM

## 2019-07-02 DIAGNOSIS — E29.1 HYPOGONADISM IN MALE: ICD-10-CM

## 2019-07-02 DIAGNOSIS — E55.9 VITAMIN D DEFICIENCY: ICD-10-CM

## 2019-07-02 LAB
PSA SERPL-MCNC: <0.1 NG/ML (ref 0–4)
REFLEX: NORMAL

## 2019-07-07 LAB — ANDROSTANOLONE SERPL-MCNC: 4.5 NG/DL

## 2019-08-01 RX ORDER — POTASSIUM CHLORIDE 20 MEQ/1
TABLET, EXTENDED RELEASE ORAL
Qty: 30 TABLET | Refills: 11 | Status: SHIPPED | OUTPATIENT
Start: 2019-08-01 | End: 2020-08-24

## 2019-10-04 DIAGNOSIS — F32.A DEPRESSION, UNSPECIFIED DEPRESSION TYPE: ICD-10-CM

## 2019-10-07 RX ORDER — LOSARTAN POTASSIUM AND HYDROCHLOROTHIAZIDE 25; 100 MG/1; MG/1
TABLET ORAL
Qty: 30 TABLET | Refills: 0 | OUTPATIENT
Start: 2019-10-07

## 2019-10-07 RX ORDER — AMITRIPTYLINE HYDROCHLORIDE 50 MG/1
TABLET, FILM COATED ORAL
Qty: 60 TABLET | Refills: 5 | OUTPATIENT
Start: 2019-10-07

## 2019-10-07 RX ORDER — FLUOXETINE HYDROCHLORIDE 20 MG/1
20 CAPSULE ORAL DAILY
Qty: 30 CAPSULE | Refills: 5 | OUTPATIENT
Start: 2019-10-07

## 2019-10-08 RX ORDER — LOSARTAN POTASSIUM AND HYDROCHLOROTHIAZIDE 25; 100 MG/1; MG/1
TABLET ORAL
Qty: 30 TABLET | Refills: 0 | OUTPATIENT
Start: 2019-10-08

## 2019-10-11 ENCOUNTER — CLINICAL SUPPORT (OUTPATIENT)
Dept: FAMILY MEDICINE CLINIC | Facility: CLINIC | Age: 54
End: 2019-10-11

## 2019-10-11 VITALS
OXYGEN SATURATION: 97 % | TEMPERATURE: 98.7 F | WEIGHT: 265 LBS | RESPIRATION RATE: 18 BRPM | HEIGHT: 62 IN | HEART RATE: 74 BPM | DIASTOLIC BLOOD PRESSURE: 80 MMHG | SYSTOLIC BLOOD PRESSURE: 132 MMHG | BODY MASS INDEX: 48.76 KG/M2

## 2019-10-11 DIAGNOSIS — R73.9 HYPERGLYCEMIA: ICD-10-CM

## 2019-10-11 DIAGNOSIS — Z02.89 ENCOUNTER FOR EXAMINATION REQUIRED BY DEPARTMENT OF TRANSPORTATION (DOT): Primary | ICD-10-CM

## 2019-10-11 DIAGNOSIS — Z13.220 SCREENING, LIPID: ICD-10-CM

## 2019-10-11 DIAGNOSIS — IMO0001 HORMONAL IMBALANCE IN TRANSGENDER PATIENT: ICD-10-CM

## 2019-10-11 DIAGNOSIS — Z12.5 SCREENING FOR MALIGNANT NEOPLASM OF PROSTATE: ICD-10-CM

## 2019-10-11 DIAGNOSIS — E03.9 ACQUIRED HYPOTHYROIDISM: ICD-10-CM

## 2019-10-11 DIAGNOSIS — Z79.818 LONG TERM (CURRENT) USE OF OTHER AGENTS AFFECTING ESTROGEN RECEPTORS AND ESTROGEN LEVELS: ICD-10-CM

## 2019-10-11 DIAGNOSIS — I10 ESSENTIAL HYPERTENSION: Primary | ICD-10-CM

## 2019-10-11 LAB
ALBUMIN SERPL-MCNC: 3.9 G/DL (ref 3.5–5)
ALBUMIN/GLOB SERPL: 1.1 G/DL (ref 1.1–1.8)
ALP SERPL-CCNC: 124 U/L (ref 38–126)
ALT SERPL W P-5'-P-CCNC: 16 U/L
ANION GAP SERPL CALCULATED.3IONS-SCNC: 9 MMOL/L (ref 5–15)
AST SERPL-CCNC: 22 U/L (ref 17–59)
BASOPHILS # BLD AUTO: 0.02 10*3/MM3 (ref 0–0.2)
BASOPHILS NFR BLD AUTO: 0.2 % (ref 0–1.5)
BILIRUB SERPL-MCNC: 0.7 MG/DL (ref 0.2–1.3)
BUN BLD-MCNC: 18 MG/DL (ref 7–23)
BUN/CREAT SERPL: 12.9 (ref 7–25)
CALCIUM SPEC-SCNC: 9.8 MG/DL (ref 8.4–10.2)
CHLORIDE SERPL-SCNC: 101 MMOL/L (ref 101–112)
CHOLEST SERPL-MCNC: 183 MG/DL (ref 150–200)
CO2 SERPL-SCNC: 28 MMOL/L (ref 22–30)
CREAT BLD-MCNC: 1.39 MG/DL (ref 0.7–1.3)
DEPRECATED RDW RBC AUTO: 42.4 FL (ref 37–54)
EOSINOPHIL # BLD AUTO: 0.36 10*3/MM3 (ref 0–0.4)
EOSINOPHIL NFR BLD AUTO: 3.8 % (ref 0.3–6.2)
ERYTHROCYTE [DISTWIDTH] IN BLOOD BY AUTOMATED COUNT: 13 % (ref 12.3–15.4)
ESTRADIOL SERPL HS-MCNC: 142 PG/ML
FSH SERPL-ACNC: 3.64 MIU/ML
GFR SERPL CREATININE-BSD FRML MDRD: 53 ML/MIN/1.73 (ref 56–130)
GLOBULIN UR ELPH-MCNC: 3.7 GM/DL (ref 2.3–3.5)
GLUCOSE BLD-MCNC: 108 MG/DL (ref 70–99)
HBA1C MFR BLD: 5.59 % (ref 4.8–5.6)
HCT VFR BLD AUTO: 40 % (ref 37.5–51)
HDLC SERPL-MCNC: 69 MG/DL (ref 40–59)
HGB BLD-MCNC: 14.1 G/DL (ref 13–17.7)
LDLC SERPL CALC-MCNC: 97 MG/DL
LDLC/HDLC SERPL: 1.4 {RATIO} (ref 0–3.55)
LH SERPL-ACNC: 7.84 MIU/ML
LYMPHOCYTES # BLD AUTO: 1.33 10*3/MM3 (ref 0.7–3.1)
LYMPHOCYTES NFR BLD AUTO: 14 % (ref 19.6–45.3)
MCH RBC QN AUTO: 32 PG (ref 26.6–33)
MCHC RBC AUTO-ENTMCNC: 35.3 G/DL (ref 31.5–35.7)
MCV RBC AUTO: 90.7 FL (ref 79–97)
MONOCYTES # BLD AUTO: 0.74 10*3/MM3 (ref 0.1–0.9)
MONOCYTES NFR BLD AUTO: 7.8 % (ref 5–12)
NEUTROPHILS # BLD AUTO: 7.07 10*3/MM3 (ref 1.7–7)
NEUTROPHILS NFR BLD AUTO: 74.2 % (ref 42.7–76)
PLATELET # BLD AUTO: 277 10*3/MM3 (ref 140–450)
PMV BLD AUTO: 9.4 FL (ref 6–12)
POTASSIUM BLD-SCNC: 4.3 MMOL/L (ref 3.4–5)
PROT SERPL-MCNC: 7.6 G/DL (ref 6.3–8.6)
PSA SERPL-MCNC: 0.04 NG/ML (ref 0–4)
RBC # BLD AUTO: 4.41 10*6/MM3 (ref 4.14–5.8)
SODIUM BLD-SCNC: 138 MMOL/L (ref 137–145)
T3 SERPL-MCNC: 144 NG/DL (ref 80–200)
T4 FREE SERPL-MCNC: 0.92 NG/DL (ref 0.93–1.7)
TESTOST SERPL-MCNC: 236 NG/DL (ref 193–740)
TRIGL SERPL-MCNC: 86 MG/DL
TSH SERPL DL<=0.05 MIU/L-ACNC: 6.23 UIU/ML (ref 0.27–4.2)
VLDLC SERPL-MCNC: 17.2 MG/DL
WBC NRBC COR # BLD: 9.52 10*3/MM3 (ref 3.4–10.8)

## 2019-10-11 PROCEDURE — 86800 THYROGLOBULIN ANTIBODY: CPT | Performed by: NURSE PRACTITIONER

## 2019-10-11 PROCEDURE — 83527 ASSAY OF INSULIN: CPT | Performed by: NURSE PRACTITIONER

## 2019-10-11 PROCEDURE — 36415 COLL VENOUS BLD VENIPUNCTURE: CPT | Performed by: NURSE PRACTITIONER

## 2019-10-11 PROCEDURE — DOTPHY: Performed by: NURSE PRACTITIONER

## 2019-10-11 PROCEDURE — 82670 ASSAY OF TOTAL ESTRADIOL: CPT | Performed by: NURSE PRACTITIONER

## 2019-10-11 PROCEDURE — 83036 HEMOGLOBIN GLYCOSYLATED A1C: CPT | Performed by: NURSE PRACTITIONER

## 2019-10-11 PROCEDURE — 84443 ASSAY THYROID STIM HORMONE: CPT | Performed by: NURSE PRACTITIONER

## 2019-10-11 PROCEDURE — 86376 MICROSOMAL ANTIBODY EACH: CPT | Performed by: NURSE PRACTITIONER

## 2019-10-11 PROCEDURE — 85025 COMPLETE CBC W/AUTO DIFF WBC: CPT | Performed by: NURSE PRACTITIONER

## 2019-10-11 PROCEDURE — 84480 ASSAY TRIIODOTHYRONINE (T3): CPT | Performed by: NURSE PRACTITIONER

## 2019-10-11 PROCEDURE — G0103 PSA SCREENING: HCPCS | Performed by: NURSE PRACTITIONER

## 2019-10-11 PROCEDURE — 83001 ASSAY OF GONADOTROPIN (FSH): CPT | Performed by: NURSE PRACTITIONER

## 2019-10-11 PROCEDURE — 80061 LIPID PANEL: CPT | Performed by: NURSE PRACTITIONER

## 2019-10-11 PROCEDURE — 84439 ASSAY OF FREE THYROXINE: CPT | Performed by: NURSE PRACTITIONER

## 2019-10-11 PROCEDURE — 83002 ASSAY OF GONADOTROPIN (LH): CPT | Performed by: NURSE PRACTITIONER

## 2019-10-11 PROCEDURE — 80053 COMPREHEN METABOLIC PANEL: CPT | Performed by: NURSE PRACTITIONER

## 2019-10-11 PROCEDURE — 84403 ASSAY OF TOTAL TESTOSTERONE: CPT | Performed by: NURSE PRACTITIONER

## 2019-10-11 PROCEDURE — 83525 ASSAY OF INSULIN: CPT | Performed by: NURSE PRACTITIONER

## 2019-10-11 NOTE — PROGRESS NOTES
Rupesh Atkins Erik Truong is a 54 y.o. male who presents to the office for a DOT Exam. See Federal DOT examination form for details of this visit.      This document has been electronically signed by JOHNNY Tarango on October 11, 2019 1:08 PM      Return in about 1 year (around 10/11/2020) for for DOT physical.

## 2019-10-12 RX ORDER — LEVOTHYROXINE SODIUM 0.03 MG/1
25 TABLET ORAL DAILY
Qty: 90 TABLET | Refills: 3 | Status: SHIPPED | OUTPATIENT
Start: 2019-10-12 | End: 2020-08-05

## 2019-10-13 LAB — THYROPEROXIDASE AB SERPL-ACNC: 18 IU/ML (ref 0–34)

## 2019-10-14 LAB — THYROGLOB AB SERPL-ACNC: <1 IU/ML (ref 0–0.9)

## 2019-10-22 ENCOUNTER — LAB (OUTPATIENT)
Dept: LAB | Facility: OTHER | Age: 54
End: 2019-10-22

## 2019-10-22 ENCOUNTER — OFFICE VISIT (OUTPATIENT)
Dept: FAMILY MEDICINE CLINIC | Facility: CLINIC | Age: 54
End: 2019-10-22

## 2019-10-22 VITALS
OXYGEN SATURATION: 99 % | DIASTOLIC BLOOD PRESSURE: 78 MMHG | BODY MASS INDEX: 48.49 KG/M2 | HEART RATE: 81 BPM | WEIGHT: 263.5 LBS | SYSTOLIC BLOOD PRESSURE: 130 MMHG | RESPIRATION RATE: 18 BRPM | TEMPERATURE: 97.7 F | HEIGHT: 62 IN

## 2019-10-22 DIAGNOSIS — R06.09 EXERTIONAL DYSPNEA: ICD-10-CM

## 2019-10-22 DIAGNOSIS — R73.9 HYPERGLYCEMIA: Primary | ICD-10-CM

## 2019-10-22 DIAGNOSIS — G47.33 OSA (OBSTRUCTIVE SLEEP APNEA): Primary | ICD-10-CM

## 2019-10-22 DIAGNOSIS — I10 ESSENTIAL HYPERTENSION: ICD-10-CM

## 2019-10-22 DIAGNOSIS — D72.829 LEUKOCYTOSIS, UNSPECIFIED TYPE: ICD-10-CM

## 2019-10-22 DIAGNOSIS — I20.9 ANGINAL PAIN (HCC): ICD-10-CM

## 2019-10-22 DIAGNOSIS — Z12.11 ENCOUNTER FOR SCREENING COLONOSCOPY: ICD-10-CM

## 2019-10-22 DIAGNOSIS — E03.9 ACQUIRED HYPOTHYROIDISM: ICD-10-CM

## 2019-10-22 LAB
BILIRUB UR QL STRIP: NEGATIVE
CLARITY UR: CLEAR
COLOR UR: YELLOW
GLUCOSE UR STRIP-MCNC: NEGATIVE MG/DL
HGB UR QL STRIP.AUTO: ABNORMAL
INSULIN FREE SERPL-ACNC: NORMAL U[IU]/ML
INSULIN SERPL-ACNC: NORMAL U[IU]/ML
KETONES UR QL STRIP: NEGATIVE
LEUKOCYTE ESTERASE UR QL STRIP.AUTO: NEGATIVE
NITRITE UR QL STRIP: NEGATIVE
PH UR STRIP.AUTO: <=5 [PH] (ref 5.5–8)
PROT UR QL STRIP: NEGATIVE
SP GR UR STRIP: 1.02 (ref 1–1.03)
UROBILINOGEN UR QL STRIP: ABNORMAL

## 2019-10-22 PROCEDURE — 99214 OFFICE O/P EST MOD 30 MIN: CPT | Performed by: NURSE PRACTITIONER

## 2019-10-22 PROCEDURE — 81003 URINALYSIS AUTO W/O SCOPE: CPT | Performed by: NURSE PRACTITIONER

## 2019-10-22 PROCEDURE — 83525 ASSAY OF INSULIN: CPT | Performed by: NURSE PRACTITIONER

## 2019-10-22 PROCEDURE — 83527 ASSAY OF INSULIN: CPT | Performed by: NURSE PRACTITIONER

## 2019-10-22 PROCEDURE — 36415 COLL VENOUS BLD VENIPUNCTURE: CPT | Performed by: NURSE PRACTITIONER

## 2019-10-22 RX ORDER — LOSARTAN POTASSIUM AND HYDROCHLOROTHIAZIDE 25; 100 MG/1; MG/1
TABLET ORAL
Qty: 30 TABLET | Refills: 0 | OUTPATIENT
Start: 2019-10-22

## 2019-10-22 RX ORDER — ALBUTEROL SULFATE 90 UG/1
2 AEROSOL, METERED RESPIRATORY (INHALATION) EVERY 4 HOURS PRN
Qty: 18 G | Refills: 11 | Status: SHIPPED | OUTPATIENT
Start: 2019-10-22 | End: 2022-02-18 | Stop reason: SDUPTHER

## 2019-10-22 RX ORDER — ISOSORBIDE MONONITRATE 30 MG/1
30 TABLET, EXTENDED RELEASE ORAL DAILY
Qty: 90 TABLET | Refills: 1 | Status: SHIPPED | OUTPATIENT
Start: 2019-10-22 | End: 2020-02-25 | Stop reason: SDUPTHER

## 2019-10-22 NOTE — PROGRESS NOTES
Chief Complaint   Patient presents with   • Establish Care     Subjective   Rupesh Valencia Jr is a 54 y.o. male who presents to the office for review of chronic conditions to establish care. Chief concern is increased exertional dyspnea.    The following portions of the patient's history were reviewed and updated as appropriate: allergies, current medications, past family history, past medical history, past social history, past surgical history and problem list.    History of Present Illness   Fasting labs:  CMP normal  Lipid normal  Estradiol normal  FSH, LH normal  CBC  WBC 12.4  PSA normal  A1C normal  TSH 6.23, T4 0.92 T3 normal  Anti thyroglob. Neg  Thyroid peridox. Neg    exertional dyspnea: onset years ago, worse over time. Has not seen pulmonology, recommend pulm eval and management will need PFTs. I will issue a refill of hiso  VIANCA: diagnosed pre 2015, with CPAP prescribed, lost to follow up and disuse, does not feel that same degree of daytime sleepiness but has not lost weigt since diagnosed, recommend follow up with sleep medicine.   HTN  minimally above goal with current meds. Previous prescription for Imdur lost to follow up when Dr Murphy left [facility]. Will reorder for this and for angina.   Angina: rare and well spaced events with no particular assignation to rest, meal time or exercise.   Hypothyroidism: controlled with newly prescribed levothyroxine.  IBS: managed with amitriptyline  Irritability/ anger issues: managed well with paroxetine.   Transgender male to female: managed with estradiol and finasteride by Dr Jacome. Goes by Priscilla when dressed as female, but dresses and works as male in this area due to his concern for embarrassing her family members.    Past Medical History:   Diagnosis Date   • Acute sinusitis    • Benign essential hypertension    • Carpal tunnel syndrome    • Chest pain    • Chronic sinusitis    • Cough    • Diabetes mellitus without complication (CMS/HCC)     type II or  unspecified type, not stated as uncontrolled      • Diarrhea of presumed infectious origin    • Dyspnea    • Dyspnea on exertion     Angina equivalent      • Dysuria    • Essential hypertension    • Gender dysphoria      transgendered, crossdresses      • Hyperkeratosis    • Hypogonadism in male 2/24/2017   • Impaired fasting blood sugar    • Inflamed seborrheic keratosis    • Male hypogonadism    • Male-to-female transgender person 2/24/2017   • Male-to-female transsexuality    • Migraine    • Morbid obesity due to excess calories (CMS/HCC) 2/24/2017   • Obesity    • Pain in joint     unspecified   • Poisoning due to venomous spider    • Renal impairment    • Routine general medical examination at a health care facility    • Special screening for malignant neoplasm of prostate    • Vitamin D deficiency 2/24/2017          Family History   Problem Relation Age of Onset   • Breast cancer Mother    • Diabetes Mother    • Diabetes Father    • Prostate cancer Father    • Skin cancer Father         Review of Systems   Constitutional: Negative.  Negative for chills, fatigue, fever and unexpected weight change.   HENT: Negative for congestion, rhinorrhea, sinus pressure, sneezing, sore throat, tinnitus and trouble swallowing.    Eyes: Negative.  Negative for visual disturbance.   Respiratory: Negative.  Negative for cough, chest tightness, shortness of breath and wheezing.    Cardiovascular: Negative.  Negative for chest pain, palpitations and leg swelling.   Gastrointestinal: Negative.  Negative for abdominal pain, constipation, diarrhea, nausea and vomiting.   Endocrine: Negative.    Genitourinary: Negative.  Negative for dysuria, frequency and urgency.   Musculoskeletal: Negative.  Negative for neck pain.   Skin: Negative.  Negative for color change, pallor, rash and wound.   Allergic/Immunologic: Negative.    Neurological: Negative.  Negative for dizziness, weakness and headaches.   Hematological: Negative.   "  Psychiatric/Behavioral: Negative.  Negative for sleep disturbance and suicidal ideas.        In the past two weeks the pt has not felt down, depressed, hopeless or lost interest in doing things   All other systems reviewed and are negative.      Objective   Vitals:    10/22/19 1335   BP: 130/78   BP Location: Left arm   Patient Position: Sitting   Cuff Size: Adult   Pulse: 81   Resp: 18   Temp: 97.7 °F (36.5 °C)   TempSrc: Tympanic   SpO2: 99%   Weight: 120 kg (263 lb 8 oz)   Height: 157.5 cm (62\")   PainSc: 0-No pain     Physical Exam   Constitutional: He is oriented to person, place, and time. He appears well-developed and well-nourished. He is cooperative.  Non-toxic appearance. He does not have a sickly appearance. He appears ill (chronically).   HENT:   Head: Normocephalic and atraumatic.   Right Ear: External ear normal.   Left Ear: External ear normal.   Nose: Mucosal edema and rhinorrhea present.   Mouth/Throat: Uvula is midline, oropharynx is clear and moist and mucous membranes are normal.   Eyes: Conjunctivae, EOM and lids are normal. Pupils are equal, round, and reactive to light. Right eye exhibits no discharge. Left eye exhibits no discharge. No scleral icterus.   Neck: Trachea normal, normal range of motion and phonation normal. Neck supple. No thyromegaly present.   Cardiovascular: Normal rate, regular rhythm, normal heart sounds and intact distal pulses. Exam reveals no gallop and no friction rub.   No murmur heard.  Pulmonary/Chest: Effort normal and breath sounds normal. No respiratory distress. He has no wheezes. He has no rales. He exhibits no tenderness.   Abdominal: Soft. Bowel sounds are normal. He exhibits no distension. There is no tenderness. There is no rebound and no guarding.   Musculoskeletal: Normal range of motion. He exhibits no edema, tenderness or deformity.   Lymphadenopathy:     He has no cervical adenopathy.   Neurological: He is alert and oriented to person, place, and time. " No cranial nerve deficit. GCS eye subscore is 4. GCS verbal subscore is 5. GCS motor subscore is 6.   Skin: Skin is warm, dry and intact. Capillary refill takes 2 to 3 seconds. No rash noted. No erythema. No pallor.   Psychiatric: He has a normal mood and affect. His behavior is normal. Judgment and thought content normal.   Nursing note and vitals reviewed.      Assessment/Plan   Rupesh was seen today for establish care.    Diagnoses and all orders for this visit:    VIANCA (obstructive sleep apnea)  -     Ambulatory Referral to Sleep Medicine    Exertional dyspnea  -     Ambulatory Referral to Pulmonology  -     albuterol sulfate  (90 Base) MCG/ACT inhaler; Inhale 2 puffs Every 4 (Four) Hours As Needed for Wheezing or Shortness of Air.    Essential hypertension  -     Ambulatory Referral to Cardiology    Anginal pain (CMS/HCC)  -     Ambulatory Referral to Cardiology  -     isosorbide mononitrate (IMDUR) 30 MG 24 hr tablet; Take 1 tablet by mouth Daily.    Leukocytosis, unspecified type  -     Urinalysis With Microscopic - Urine, Clean Catch; Future  -     Urinalysis without microscopic (no culture) - Urine, Clean Catch  -     Cancel: CBC & Differential    Encounter for screening colonoscopy  -     Ambulatory Referral For Screening Colonoscopy    Acquired hypothyroidism  -     T4, Free; Future  -     TSH; Future  -     T3; Future           PHQ-2/PHQ-9 Depression Screening 11/27/2018   Little interest or pleasure in doing things 0   Feeling down, depressed, or hopeless 0   Trouble falling or staying asleep, or sleeping too much -   Feeling tired or having little energy -   Poor appetite or overeating -   Feeling bad about yourself - or that you are a failure or have let yourself or your family down -   Trouble concentrating on things, such as reading the newspaper or watching television -   Moving or speaking so slowly that other people could have noticed. Or the opposite - being so fidgety or restless that  you have been moving around a lot more than usual -   Thoughts that you would be better off dead, or of hurting yourself in some way -   Total Score 0   If you checked off any problems, how difficult have these problems made it for you to do your work, take care of things at home, or get along with other people? -       Stephy Coello, APRN         Return in about 6 months (around 4/22/2020).    There are no Patient Instructions on file for this visit.

## 2019-10-27 LAB
INSULIN FREE SERPL-ACNC: 71 UU/ML
INSULIN SERPL-ACNC: 78 UU/ML

## 2019-10-28 RX ORDER — METFORMIN HYDROCHLORIDE 500 MG/1
500 TABLET, EXTENDED RELEASE ORAL
Qty: 90 TABLET | Refills: 1 | Status: SHIPPED | OUTPATIENT
Start: 2019-10-28 | End: 2020-04-30

## 2019-10-29 ENCOUNTER — OFFICE VISIT (OUTPATIENT)
Dept: GASTROENTEROLOGY | Facility: CLINIC | Age: 54
End: 2019-10-29

## 2019-10-29 VITALS
HEIGHT: 63 IN | HEART RATE: 79 BPM | DIASTOLIC BLOOD PRESSURE: 62 MMHG | WEIGHT: 268 LBS | BODY MASS INDEX: 47.48 KG/M2 | SYSTOLIC BLOOD PRESSURE: 102 MMHG

## 2019-10-29 DIAGNOSIS — K76.0 FATTY LIVER: ICD-10-CM

## 2019-10-29 DIAGNOSIS — N17.9 ACUTE KIDNEY INJURY (HCC): ICD-10-CM

## 2019-10-29 DIAGNOSIS — Z12.11 ENCOUNTER FOR SCREENING FOR MALIGNANT NEOPLASM OF COLON: Primary | ICD-10-CM

## 2019-10-29 PROCEDURE — 99212 OFFICE O/P EST SF 10 MIN: CPT | Performed by: PHYSICIAN ASSISTANT

## 2019-10-29 RX ORDER — PEG-3350, SODIUM SULFATE, SODIUM CHLORIDE, POTASSIUM CHLORIDE, SODIUM ASCORBATE AND ASCORBIC ACID 7.5-2.691G
KIT ORAL
Qty: 1 EACH | Refills: 0 | Status: SHIPPED | OUTPATIENT
Start: 2019-10-29 | End: 2019-10-31

## 2019-10-29 RX ORDER — DEXTROSE AND SODIUM CHLORIDE 5; .45 G/100ML; G/100ML
30 INJECTION, SOLUTION INTRAVENOUS CONTINUOUS PRN
Status: CANCELLED | OUTPATIENT
Start: 2019-11-01

## 2019-10-29 NOTE — PROGRESS NOTES
Chief Complaint   Patient presents with   • Eval For Colonoscopy     Ref. Stephy TURNER       ENDO PROCEDURE ORDERED: COLON screen    Subjective    Rupesh Wilbert Valencia Jr is a 54 y.o. male. he is being seen for consultation today at the request of Stephy TURNER.    History of Present Illness    This pleasant 54-year-old transgender male works as a . He was sent for evaluation for colonoscopy by JOHNNY Tariq, with whom the patient established on 10/22/2019. Patient was having some dyspnea. A free insulin was 71, trace abnormalities on the urinalysis. No other recent studies were available at the time I saw the patient. He had gone to the emergency room in [place], [place], while on the road and was told he had a kidney stone. He states he has had these in the past. It is not clear if he passed the stone. I do not believe he had seen a urologist. He states he is set up to Dr. Garcia, Cardiologist, on 10/31/2019. After the patient left, we were able to get records from [facility] in [place], [place], dated 10/07/2019. Patient had urinalysis showing blood. CBC showed white count 15.7, otherwise fairly normal. Normal lipase, troponin. CMP showed glucose 128, creatinine 1.4, GFR 56, albumin 3.2, alkaline phosphatase 138, otherwise normal. A right upper quadrant ultrasound showed a fatty liver, 4 mm common bile duct. Subsequent CT scan abdomen and pelvis without contrast showed a 7 mm obstructing right midureteral calculus with moderate right hydronephrosis with perinephric stranding, fatty liver with normal appendix.     Patient had laboratories 10/11/2019 which showed normal estradiol, testosterone, FSH, LH, PSA, TPO. TSH was elevated at 6.23. T4 was low at 0.92. T3 was normal. Patient is followed by Rome Morrison MD. Cholesterol panel was okay. CBC was fairly normal. CMP showed glucose 108, creatinine 1.39, with a GFR of 53. He has not been told in the past that his renal  function was abnormal. I did talk to him about a possible nephrology evaluation.     Patient states he does get frequent nosebleeds. He has had issues with deterioration of his nasal septum. He has previously worked in a halfway. He denies any drug use to have contributed to this, but he states he does have frequent nosebleeds and suspects he would have blood in his stool from this. He otherwise denied abdominal pain, heartburn, nausea, vomiting. Bowels are moving without blood or mucus. Weight has been stable. He has never had endoscopy.    Patient currently denies tobacco use. He does drink bourbon, about a pint a week. He has had some broken hands, fingers, nose. He has had bilateral carpal tunnel with trigger finger release, history of kidney stones and asthma. 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 Family history of diabetes, heart disease, unknown cancer, hypertension, allergies. No known family history of GI tract malignancy or liver disease. Father  at age 90 with dementia. Mother  unknown cancer at age 61. He is unmarried. Two brothers, one with heart disease, one in poor health. One brother  at birth. One sister in poor health. No children.    ASSESSMENT/PLAN: Patient does need screening colonoscopy. After receiving his other laboratories, he does have some elevated renal function. Given his right hydronephrosis with obstruction, would recommend confirming that this has cleared. Would consider urological consultation. If his renal function does not improve, would recommend nephrology evaluation. We will leave that up to Stephy to evaluate. Also, given his imaging showing a fatty liver, I would recommend a [facility] [employer], hepatitis diagnostic panel, possible additional testing depending on those results. We will pursue further pending clinical course and the results of the above.    Thank you very much, Stephy, for this consultation and for allowing us to participate in the care of your  patient. We will keep you informed.        The following portions of the patient's history were reviewed and updated as appropriate:   Past Medical History:   Diagnosis Date   • Acute sinusitis    • Benign essential hypertension    • Carpal tunnel syndrome    • Chest pain    • Chronic sinusitis    • Cough    • Diabetes mellitus without complication (CMS/HCC)     type II or unspecified type, not stated as uncontrolled      • Diarrhea of presumed infectious origin    • Dyspnea    • Dyspnea on exertion     Angina equivalent      • Dysuria    • Essential hypertension    • Gender dysphoria      transgendered, crossdresses      • Hyperkeratosis    • Hypogonadism in male 2/24/2017   • Impaired fasting blood sugar    • Inflamed seborrheic keratosis    • Male hypogonadism    • Male-to-female transgender person 2/24/2017   • Male-to-female transsexuality    • Migraine    • Morbid obesity due to excess calories (CMS/HCC) 2/24/2017   • Obesity    • Pain in joint     unspecified   • Poisoning due to venomous spider    • Renal impairment    • Routine general medical examination at a health care facility    • Special screening for malignant neoplasm of prostate    • Vitamin D deficiency 2/24/2017     Past Surgical History:   Procedure Laterality Date   • CARDIAC CATHETERIZATION  07/09/2015    Normal coronaries. Normal LV systolic and diastolic function.   • EYE SURGERY      x3   • INJECTION OF MEDICATION  11/16/2015    Depo Medrol (Methylprednisone) 80mg (1)     • INJECTION OF MEDICATION  03/24/2014    Inj(s) Tend-Sheath, Ligament, Single 63683 (2)    • INJECTION OF MEDICATION  03/24/2014    Kenalog (2)     • INJECTION OF MEDICATION  07/14/2016    Rocephin (2)     Family History   Problem Relation Age of Onset   • Breast cancer Mother    • Diabetes Mother    • Diabetes Father    • Prostate cancer Father    • Skin cancer Father        Allergies   Allergen Reactions   • Augmentin [Amoxicillin-Pot Clavulanate] Diarrhea     Diarrhea       Social History     Socioeconomic History   • Marital status:      Spouse name: Not on file   • Number of children: Not on file   • Years of education: Not on file   • Highest education level: Not on file   Tobacco Use   • Smoking status: Never Smoker   • Smokeless tobacco: Never Used   Substance and Sexual Activity   • Alcohol use: Yes     Comment: occasional   • Drug use: No   • Sexual activity: Defer     Current Medications:  Prior to Admission medications    Medication Sig Start Date End Date Taking? Authorizing Provider   albuterol sulfate  (90 Base) MCG/ACT inhaler Inhale 2 puffs Every 4 (Four) Hours As Needed for Wheezing or Shortness of Air. 10/22/19  Yes Stephy Coello APRN   amitriptyline (ELAVIL) 50 MG tablet 1 OR 2 TABLETS BY MOUTH AT BEDTIME AS NEEDED 1/31/19  Yes Samia Gonzalez APRN   atenolol (TENORMIN) 25 MG tablet TAKE 2 TABLETS BY MOUTH DAILY. 7/1/19  Yes Samia Gonzalez APRN   estradiol (ESTRACE) 2 MG tablet Take 4 tablets by mouth Daily. 6/27/19  Yes Fausto Malone MD   finasteride (PROSCAR) 5 MG tablet Take 1 tablet by mouth Daily. 6/27/19  Yes Fausto Malone MD   FLUoxetine (PROzac) 20 MG capsule TAKE 1 CAPSULE BY MOUTH DAILY. 1/31/19  Yes Samia Gonzalez APRN   fluticasone (FLONASE) 50 MCG/ACT nasal spray 1 spray into the nostril(s) as directed by provider Daily. Spray in each nostril. 11/27/18  Yes Samia Gonzalez APRN   isosorbide mononitrate (IMDUR) 30 MG 24 hr tablet Take 1 tablet by mouth Daily. 10/22/19  Yes Stephy Coello APRN   levothyroxine (SYNTHROID, LEVOTHROID) 25 MCG tablet Take 1 tablet by mouth Daily. 10/12/19  Yes Stephy Coello APRN   losartan-hydrochlorothiazide (HYZAAR) 50-12.5 MG per tablet TAKE 1 TABLET BY MOUTH DAILY. 1/31/19  Yes Samia Gonzalez APRN   metFORMIN ER (GLUCOPHAGE-XR) 500 MG 24 hr tablet Take 1 tablet by mouth Daily With Breakfast. 10/28/19  Yes Stephy Coello APRN   mupirocin (BACTROBAN) 2 %  "ointment Apply  topically 2 (Two) Times a Day. 3/24/17  Yes Nikki Live APRN   nystatin (MYCOSTATIN) 040936 UNIT/GM powder APPLY TOPICALLY 2 (TWO) TIMES A DAY. 4/10/18  Yes Priscilla Viera APRN   potassium chloride (K-DUR,KLOR-CON) 20 MEQ CR tablet TAKE ONE TABLET BY MOUTH EVERY DAY 8/1/19  Yes Fausto Malone MD   Syringe/Needle, Disp, (SYRINGE LUER LOCK) 25G X 1\" 3 ML misc 1 application Every 30 (Thirty) Days. 9/7/18  Yes Samia Gonzalez APRN     Review of Systems  Review of Systems   HENT: Negative for trouble swallowing.    Gastrointestinal: Negative for abdominal distention, abdominal pain, anal bleeding, blood in stool, constipation, diarrhea, nausea, rectal pain and vomiting.   All other systems reviewed and are negative.         Objective    /62 (BP Location: Left arm)   Pulse 79   Ht 160 cm (63\")   Wt 122 kg (268 lb)   BMI 47.47 kg/m²   Physical Exam   Constitutional: He is oriented to person, place, and time. He appears well-developed and well-nourished. No distress.   HENT:   Head: Normocephalic and atraumatic.   Eyes: EOM are normal. Pupils are equal, round, and reactive to light.   Neck: Normal range of motion.   Cardiovascular: Normal rate, regular rhythm and normal heart sounds.   Pulmonary/Chest: Effort normal and breath sounds normal.   Abdominal: Soft. Bowel sounds are normal. He exhibits no shifting dullness, no distension, no abdominal bruit, no ascites and no mass. There is no hepatosplenomegaly. There is tenderness. There is no rigidity, no rebound, no guarding and no CVA tenderness. No hernia. Hernia confirmed negative in the ventral area.   mild   Musculoskeletal: Normal range of motion.   Neurological: He is alert and oriented to person, place, and time.   Skin: Skin is warm and dry.   Psychiatric: He has a normal mood and affect. His behavior is normal. Judgment and thought content normal.   Nursing note and vitals reviewed.    Assessment/Plan      1. " Encounter for screening for malignant neoplasm of colon    2. Fatty liver    3. Acute kidney injury (CMS/HCC)    .   Rupesh was seen today for eval for colonoscopy.    Diagnoses and all orders for this visit:    Encounter for screening for malignant neoplasm of colon  -     Case Request; Standing  -     dextrose 5 % and sodium chloride 0.45 % infusion  -     Case Request    Fatty liver    Acute kidney injury (CMS/HCC)    Other orders  -     Follow Anesthesia Guidelines / Standing Orders; Future  -     Obtain Informed Consent; Future  -     Obtain Informed Consent; Standing  -     POC Glucose Once; Standing  -     Discontinue: PEG-KCl-NaCl-NaSulf-Na Asc-C (MOVIPREP) 100 g reconstituted solution powder; As directed per instruction sheet for colonoscopy  -     polyethylene glycol (GoLYTELY) 236 g solution; As directed per instruction sheet for colonoscopy        Orders placed during this encounter include:  Orders Placed This Encounter   Procedures   • Follow Anesthesia Guidelines / Standing Orders     Standing Status:   Future   • Obtain Informed Consent     Standing Status:   Future     Order Specific Question:   Informed Consent Given For     Answer:   COLONOSCOPY       Medications prescribed:  New Medications Ordered This Visit   Medications   • polyethylene glycol (GoLYTELY) 236 g solution     Sig: As directed per instruction sheet for colonoscopy     Dispense:  4000 mL     Refill:  0       Requested Prescriptions     Signed Prescriptions Disp Refills   • polyethylene glycol (GoLYTELY) 236 g solution 4000 mL 0     Sig: As directed per instruction sheet for colonoscopy       Review and/or summary of lab tests, radiology, procedures, medications. Review and summary of old records and obtaining of history. The risks and benefits of my recommendations, as well as other treatment options were discussed with the patient today. Questions were answered.    Follow-up: Return if symptoms worsen or fail to improve.            COLONOSCOPY (N/A)      This document has been electronically signed by Gonzales Cortes PA-C on October 31, 2019 4:20 PM      Results for orders placed or performed in visit on 10/22/19   Urinalysis without microscopic (no culture) - Urine, Clean Catch   Result Value Ref Range    Color, UA Yellow Yellow, Straw    Appearance, UA Clear Clear    pH, UA <=5.0 (L) 5.5 - 8.0    Specific Gravity, UA 1.025 1.005 - 1.030    Glucose, UA Negative Negative    Ketones, UA Negative Negative    Bilirubin, UA Negative Negative    Blood, UA Trace (A) Negative    Protein, UA Negative Negative    Leuk Esterase, UA Negative Negative    Nitrite, UA Negative Negative    Urobilinogen, UA 0.2 E.U./dL 0.2 - 1.0 E.U./dL   Results for orders placed or performed in visit on 10/22/19   Insulin, Free & Total, Serum   Result Value Ref Range    Insulin, Free 71 (H) uU/mL    Insulin 78 uU/mL   Results for orders placed or performed in visit on 10/11/19   PSA Screen   Result Value Ref Range    PSA 0.040 0.000 - 4.000 ng/mL   FSH & LH   Result Value Ref Range    FSH 3.64 mIU/mL    LH 7.84 mIU/mL   CBC Auto Differential   Result Value Ref Range    WBC 9.52 3.40 - 10.80 10*3/mm3    RBC 4.41 4.14 - 5.80 10*6/mm3    Hemoglobin 14.1 13.0 - 17.7 g/dL    Hematocrit 40.0 37.5 - 51.0 %    MCV 90.7 79.0 - 97.0 fL    MCH 32.0 26.6 - 33.0 pg    MCHC 35.3 31.5 - 35.7 g/dL    RDW 13.0 12.3 - 15.4 %    RDW-SD 42.4 37.0 - 54.0 fl    MPV 9.4 6.0 - 12.0 fL    Platelets 277 140 - 450 10*3/mm3    Neutrophil % 74.2 42.7 - 76.0 %    Lymphocyte % 14.0 (L) 19.6 - 45.3 %    Monocyte % 7.8 5.0 - 12.0 %    Eosinophil % 3.8 0.3 - 6.2 %    Basophil % 0.2 0.0 - 1.5 %    Neutrophils, Absolute 7.07 (H) 1.70 - 7.00 10*3/mm3    Lymphocytes, Absolute 1.33 0.70 - 3.10 10*3/mm3    Monocytes, Absolute 0.74 0.10 - 0.90 10*3/mm3    Eosinophils, Absolute 0.36 0.00 - 0.40 10*3/mm3    Basophils, Absolute 0.02 0.00 - 0.20 10*3/mm3   Thyroid Peroxidase Antibody   Result Value Ref Range     Thyroid Peroxidase Antibody 18 0 - 34 IU/mL   Insulin, Free & Total, Serum   Result Value Ref Range    Insulin, Free      Insulin     Estradiol   Result Value Ref Range    Estradiol 142.0 pg/mL   Anti-Thyroglobulin Antibody   Result Value Ref Range    Thyroglobulin Ab <1.0 0.0 - 0.9 IU/mL   T3   Result Value Ref Range    T3, Total 144.0 80.0 - 200.0 ng/dl   TSH   Result Value Ref Range    TSH 6.230 (H) 0.270 - 4.200 uIU/mL   T4, Free   Result Value Ref Range    Free T4 0.92 (L) 0.93 - 1.70 ng/dL   Testosterone   Result Value Ref Range    Testosterone, Total 236.00 193.00 - 740.00 ng/dL   Hemoglobin A1c   Result Value Ref Range    Hemoglobin A1C 5.59 4.80 - 5.60 %   Lipid Panel   Result Value Ref Range    Total Cholesterol 183 150 - 200 mg/dL    Triglycerides 86 <=150 mg/dL    HDL Cholesterol 69 (H) 40 - 59 mg/dL    LDL Cholesterol  97 <=100 mg/dL    VLDL Cholesterol 17.2 mg/dL    LDL/HDL Ratio 1.40 0.00 - 3.55   Comprehensive Metabolic Panel   Result Value Ref Range    Glucose 108 (H) 70 - 99 mg/dL    BUN 18 7 - 23 mg/dL    Creatinine 1.39 (H) 0.70 - 1.30 mg/dL    Sodium 138 137 - 145 mmol/L    Potassium 4.3 3.4 - 5.0 mmol/L    Chloride 101 101 - 112 mmol/L    CO2 28.0 22.0 - 30.0 mmol/L    Calcium 9.8 8.4 - 10.2 mg/dL    Total Protein 7.6 6.3 - 8.6 g/dL    Albumin 3.90 3.50 - 5.00 g/dL    ALT (SGPT) 16 <=50 U/L    AST (SGOT) 22 17 - 59 U/L    Alkaline Phosphatase 124 38 - 126 U/L    Total Bilirubin 0.7 0.2 - 1.3 mg/dL    eGFR Non  Amer 53 (L) 56 - 130 mL/min/1.73    Globulin 3.7 (H) 2.3 - 3.5 gm/dL    A/G Ratio 1.1 1.1 - 1.8 g/dL    BUN/Creatinine Ratio 12.9 7.0 - 25.0    Anion Gap 9.0 5.0 - 15.0 mmol/L   Results for orders placed or performed in visit on 06/27/19   FSH & LH   Result Value Ref Range    FSH 3.07 mIU/mL    LH 9.60 mIU/mL   CBC Auto Differential   Result Value Ref Range    WBC 12.16 (H) 3.40 - 10.80 10*3/mm3    RBC 4.51 4.14 - 5.80 10*6/mm3    Hemoglobin 14.4 13.0 - 17.7 g/dL    Hematocrit 40.8  37.5 - 51.0 %    MCV 90.5 79.0 - 97.0 fL    MCH 31.9 26.6 - 33.0 pg    MCHC 35.3 31.5 - 35.7 g/dL    RDW 14.1 12.3 - 15.4 %    RDW-SD 45.3 37.0 - 54.0 fl    MPV 9.4 6.0 - 12.0 fL    Platelets 305 140 - 450 10*3/mm3   Dihydrotestosterone   Result Value Ref Range    Dihydrotestosterone 4.5 (L) ng/dL   Estradiol   Result Value Ref Range    Estradiol 153.0 pg/mL   Manual Differential   Result Value Ref Range    Neutrophil % 67.0 42.7 - 76.0 %    Lymphocyte % 22.0 19.6 - 45.3 %    Monocyte % 7.0 5.0 - 12.0 %    Eosinophil % 4.0 0.3 - 6.2 %    Neutrophils Absolute 8.15 (H) 1.70 - 7.00 10*3/mm3    Lymphocytes Absolute 2.68 0.70 - 3.10 10*3/mm3    Monocytes Absolute 0.85 0.10 - 0.90 10*3/mm3    Eosinophils Absolute 0.49 (H) 0.00 - 0.40 10*3/mm3    Anisocytosis Slight/1+ None Seen    WBC Morphology Normal Normal    Platelet Estimate Adequate Normal   TSH   Result Value Ref Range    TSH 4.830 (H) 0.270 - 4.200 mIU/mL     *Note: Due to a large number of results and/or encounters for the requested time period, some results have not been displayed. A complete set of results can be found in Results Review.       Some portions of this note have been dictated using voice recognition software and may contain errors and/or omissions.

## 2019-10-29 NOTE — PATIENT INSTRUCTIONS

## 2019-10-30 DIAGNOSIS — R07.9 CHEST PAIN, UNSPECIFIED TYPE: Primary | ICD-10-CM

## 2019-10-31 ENCOUNTER — OFFICE VISIT (OUTPATIENT)
Dept: CARDIOLOGY | Facility: CLINIC | Age: 54
End: 2019-10-31

## 2019-10-31 VITALS
OXYGEN SATURATION: 99 % | HEART RATE: 74 BPM | BODY MASS INDEX: 47.48 KG/M2 | DIASTOLIC BLOOD PRESSURE: 76 MMHG | SYSTOLIC BLOOD PRESSURE: 130 MMHG | HEIGHT: 63 IN | WEIGHT: 268 LBS

## 2019-10-31 DIAGNOSIS — R07.9 CHEST PAIN, UNSPECIFIED TYPE: Primary | ICD-10-CM

## 2019-10-31 PROCEDURE — 93000 ELECTROCARDIOGRAM COMPLETE: CPT | Performed by: INTERNAL MEDICINE

## 2019-10-31 PROCEDURE — 99203 OFFICE O/P NEW LOW 30 MIN: CPT | Performed by: INTERNAL MEDICINE

## 2019-10-31 NOTE — PROGRESS NOTES
[facility] Cardiology  OFFICE NOTE    Cardiovascular Medicine  Michell Garcia M.D., RPVI         Coello, Stephy Santizon, APRN  [address] [place] [address]    Thank you for asking me to see Rupesh Valencia Jr for shortness of breath.    History of Present Illness  This is a 54 y.o. male with:    1.  Hypertension  2.  Hyperlipidemia  3.  Morbid obesity  4.  Hypogonadism  5.  Male female transgender person.    Rupesh Valencia Jr is a 54 y.o. male who presents for consultation today.  Patient was previously seen by Dr. ORONA in 2014 at which time he had a cardiac catheterization done for chest pain he was noted to have normal epicardial coronary anatomy with endothelial dysfunction, normal LV ejection fraction subsequently patient was started on Nance and has done well.  Over the last few months patient reported he is getting short of breath on mild exertion.  Denies any orthopnea PND or lower extremity swelling with the symptoms.  Also been having greenish sputum but denying any cough or fever chills.  No sick contacts.  Patient does not smoke.  Denying any more chest pain.  No palpitations.  No other acute complaints per      Review of Systems - ROS  Constitution: Negative for weakness, weight gain and weight loss.   HENT: Negative for congestion.    Eyes: Negative for blurred vision.   Cardiovascular: As mentioned above  Respiratory: Negative for cough and hemoptysis.    Endocrine: Negative for polydipsia and polyuria.   Hematologic/Lymphatic: Negative for bleeding problem. Does not bruise/bleed easily.   Skin: Negative for flushing.   Musculoskeletal: Negative for neck pain and stiffness.   Gastrointestinal: Negative for abdominal pain, diarrhea, jaundice, melena, nausea and vomiting.   Genitourinary: Negative for dysuria and hematuria.   Neurological: Negative for dizziness, focal weakness and numbness.   Psychiatric/Behavioral: Negative for altered mental status and depression.           All other systems were reviewed and were negative.    family history includes Breast cancer in his mother; Diabetes in his father and mother; Prostate cancer in his father; Skin cancer in his father.     reports that he has never smoked. He has never used smokeless tobacco. He reports that he drinks alcohol. He reports that he does not use drugs.    Allergies   Allergen Reactions   • Augmentin [Amoxicillin-Pot Clavulanate] Diarrhea     Diarrhea          Current Outpatient Medications:   •  albuterol sulfate  (90 Base) MCG/ACT inhaler, Inhale 2 puffs Every 4 (Four) Hours As Needed for Wheezing or Shortness of Air., Disp: 18 g, Rfl: 11  •  amitriptyline (ELAVIL) 50 MG tablet, 1 OR 2 TABLETS BY MOUTH AT BEDTIME AS NEEDED, Disp: 60 tablet, Rfl: 5  •  atenolol (TENORMIN) 25 MG tablet, TAKE 2 TABLETS BY MOUTH DAILY., Disp: 60 tablet, Rfl: 5  •  estradiol (ESTRACE) 2 MG tablet, Take 4 tablets by mouth Daily., Disp: 120 tablet, Rfl: 11  •  finasteride (PROSCAR) 5 MG tablet, Take 1 tablet by mouth Daily., Disp: 30 tablet, Rfl: 11  •  FLUoxetine (PROzac) 20 MG capsule, TAKE 1 CAPSULE BY MOUTH DAILY., Disp: 30 capsule, Rfl: 5  •  fluticasone (FLONASE) 50 MCG/ACT nasal spray, 1 spray into the nostril(s) as directed by provider Daily. Spray in each nostril., Disp: 1 bottle, Rfl: 0  •  isosorbide mononitrate (IMDUR) 30 MG 24 hr tablet, Take 1 tablet by mouth Daily., Disp: 90 tablet, Rfl: 1  •  levothyroxine (SYNTHROID, LEVOTHROID) 25 MCG tablet, Take 1 tablet by mouth Daily., Disp: 90 tablet, Rfl: 3  •  losartan-hydrochlorothiazide (HYZAAR) 50-12.5 MG per tablet, TAKE 1 TABLET BY MOUTH DAILY., Disp: 30 tablet, Rfl: 5  •  metFORMIN ER (GLUCOPHAGE-XR) 500 MG 24 hr tablet, Take 1 tablet by mouth Daily With Breakfast., Disp: 90 tablet, Rfl: 1  •  mupirocin (BACTROBAN) 2 % ointment, Apply  topically 2 (Two) Times a Day., Disp: 30 g, Rfl: 5  •  nystatin (MYCOSTATIN) 663534 UNIT/GM powder, APPLY TOPICALLY 2 (TWO) TIMES A  "DAY., Disp: 45 g, Rfl: 0  •  polyethylene glycol (GoLYTELY) 236 g solution, As directed per instruction sheet for colonoscopy, Disp: 4000 mL, Rfl: 0  •  potassium chloride (K-DUR,KLOR-CON) 20 MEQ CR tablet, TAKE ONE TABLET BY MOUTH EVERY DAY, Disp: 30 tablet, Rfl: 11  •  Syringe/Needle, Disp, (SYRINGE LUER LOCK) 25G X 1\" 3 ML misc, 1 application Every 30 (Thirty) Days., Disp: 50 each, Rfl: 12    Current Facility-Administered Medications:   •  cyanocobalamin injection 1,000 mcg, 1,000 mcg, Intramuscular, Q28 Days, Carlos Samia Otilia, APRN, 1,000 mcg at 08/10/18 1611    Physical Exam:  Vitals:    10/31/19 1334 10/31/19 1336   BP: 126/72 130/76   BP Location: Right arm Left arm   Patient Position: Sitting Sitting   Cuff Size: Adult Adult   Pulse: 74    SpO2: 99%    Weight: 122 kg (268 lb)    Height: 160 cm (62.99\")      Current Pain Level: none  Pulse Ox: Normal  on room air  General: alert, appears stated age and cooperative     Body Habitus: well-nourished    HEENT: Head: Normocephalic, no lesions, without obvious abnormality. No arcus senilis, xanthelasma or xanthomas.    Neuro: alert, oriented x3  Pulses: 2+ and symmetric  JVP: Volume/Pulsation: Normal.  Normal waveforms.   Appropriate inspiratory decrease.  No Kussmaul's. No Alan's.   Carotid Exam: no bruit normal pulsation bilaterally   Carotid Volume: normal.     Respirations: no increased work of breathing   Chest:  Normal    Pulmonary:Normal   Precordium: Normal impulses. P2 is not palpable.  RV Heave: absent  LV Heave: absent  [place]:  normal size and placement  Palpable S4: absent.  Heart rate: normal    Heart Rhythm: regular     Heart Sounds: S1: normal  S2: normal  S3: absent   S4: absent  Opening Snap: absent    Pericardial Rub:  Absent: .    Abdomen:   Appearance: normal .  Palpation: Soft, non-tender to palpation, bowel sounds positive in all four quadrants; no guarding or rebound tenderness  Extremity: no edema.   LE Skin: no rashes  LE Hair:  " normal  LE Pulses: well perfused with normal pulses in the distal extremities  Pallor on elevation: Absent. Rubor on dependency: None      DATA REVIEWED:     EKG. I personally reviewed and interpreted the EKG.  Normal sinus rhythm normal EKG.    ECG/EMG Results (all)     None        ---------------------------------------------------  TTE/NILES:         --------------------------------------------------------------------------------------------------  LABS:     The 10-year CVD risk score (Rajinder et al., 2008) is: 10.3%    Values used to calculate the score:      Age: 54 years      Sex: Male      Diabetic: No      Tobacco smoker: No      Systolic Blood Pressure: 130 mmHg      Is BP treated: Yes      HDL Cholesterol: 69 mg/dL      Total Cholesterol: 183 mg/dL         Lab Results   Component Value Date    GLUCOSE 108 (H) 10/11/2019    BUN 18 10/11/2019    CREATININE 1.39 (H) 10/11/2019    EGFRIFNONA 53 (L) 10/11/2019    BCR 12.9 10/11/2019    K 4.3 10/11/2019    CO2 28.0 10/11/2019    CALCIUM 9.8 10/11/2019    ALBUMIN 3.90 10/11/2019    AST 22 10/11/2019    ALT 16 10/11/2019     Lab Results   Component Value Date    WBC 9.52 10/11/2019    HGB 14.1 10/11/2019    HCT 40.0 10/11/2019    MCV 90.7 10/11/2019     10/11/2019     Lab Results   Component Value Date    CHOL 183 10/11/2019    CHLPL 166 07/19/2016    TRIG 86 10/11/2019    HDL 69 (H) 10/11/2019    LDL 97 10/11/2019     Lab Results   Component Value Date    TSH 6.230 (H) 10/11/2019    T8NWVFQ 144.0 10/11/2019    THYROIDAB 18 10/11/2019     Lab Results   Component Value Date    CKTOTAL 63 07/08/2015    CKMB 0.5 07/08/2015    TROPONINI <0.012 07/08/2015     Lab Results   Component Value Date    HGBA1C 5.59 10/11/2019     No results found for: DDIMER  Lab Results   Component Value Date    ALT 16 10/11/2019     Lab Results   Component Value Date    HGBA1C 5.59 10/11/2019    HGBA1C 5.0 08/11/2017    HGBA1C 5.3 07/19/2016     Lab Results   Component Value Date     CREATININE 1.39 (H) 10/11/2019     No results found for: IRON, TIBC, FERRITIN  No results found for: INR, PROTIME    Assessment/Plan     1.  Shortness of breath:  Multifactorial from obesity hypertension.  We will get an echocardiogram to assess diastolic and diastolic function.  Counseled him to exercise lose weight.  Depending upon results of echocardiogram might consider starting him on a diuretic.    2.  Hypertension:  Well-controlled on atenolol 25 mg, Imdur 30 mg, losartan hydrochlorothiazide 50/12.5.        Prevention:  Patient's Body mass index is 47.49 kg/m². BMI is above normal parameters. Recommendations include: exercise counseling and nutrition counseling.      Rupesh Valencia Jr is not a smoker      AAA Screening:   Not needed    6 weeks         This document has been electronically signed by Michell Garcia MD on October 31, 2019 1:59 PM

## 2019-11-01 ENCOUNTER — HOSPITAL ENCOUNTER (OUTPATIENT)
Facility: HOSPITAL | Age: 54
Setting detail: HOSPITAL OUTPATIENT SURGERY
Discharge: HOME | End: 2019-11-01
Attending: INTERNAL MEDICINE | Admitting: INTERNAL MEDICINE

## 2019-11-01 ENCOUNTER — ANESTHESIA (OUTPATIENT)
Dept: GASTROENTEROLOGY | Facility: HOSPITAL | Age: 54
End: 2019-11-01

## 2019-11-01 ENCOUNTER — ANESTHESIA EVENT (OUTPATIENT)
Dept: GASTROENTEROLOGY | Facility: HOSPITAL | Age: 54
End: 2019-11-01

## 2019-11-01 VITALS
BODY MASS INDEX: 46.25 KG/M2 | RESPIRATION RATE: 18 BRPM | WEIGHT: 261 LBS | SYSTOLIC BLOOD PRESSURE: 115 MMHG | OXYGEN SATURATION: 98 % | DIASTOLIC BLOOD PRESSURE: 60 MMHG | HEART RATE: 70 BPM | HEIGHT: 63 IN | TEMPERATURE: 97.5 F

## 2019-11-01 DIAGNOSIS — Z12.11 ENCOUNTER FOR SCREENING FOR MALIGNANT NEOPLASM OF COLON: ICD-10-CM

## 2019-11-01 PROCEDURE — 25010000002 PROPOFOL 10 MG/ML EMULSION: Performed by: NURSE ANESTHETIST, CERTIFIED REGISTERED

## 2019-11-01 PROCEDURE — 88305 TISSUE EXAM BY PATHOLOGIST: CPT | Performed by: INTERNAL MEDICINE

## 2019-11-01 PROCEDURE — 45380 COLONOSCOPY AND BIOPSY: CPT | Performed by: INTERNAL MEDICINE

## 2019-11-01 PROCEDURE — 88305 TISSUE EXAM BY PATHOLOGIST: CPT | Performed by: PATHOLOGY

## 2019-11-01 RX ORDER — PROPOFOL 10 MG/ML
VIAL (ML) INTRAVENOUS AS NEEDED
Status: DISCONTINUED | OUTPATIENT
Start: 2019-11-01 | End: 2019-11-01 | Stop reason: SURG

## 2019-11-01 RX ORDER — DEXTROSE AND SODIUM CHLORIDE 5; .45 G/100ML; G/100ML
30 INJECTION, SOLUTION INTRAVENOUS CONTINUOUS PRN
Status: DISCONTINUED | OUTPATIENT
Start: 2019-11-01 | End: 2019-11-01 | Stop reason: HOSPADM

## 2019-11-01 RX ORDER — LIDOCAINE HYDROCHLORIDE 20 MG/ML
INJECTION, SOLUTION INTRAVENOUS AS NEEDED
Status: DISCONTINUED | OUTPATIENT
Start: 2019-11-01 | End: 2019-11-01 | Stop reason: SURG

## 2019-11-01 RX ADMIN — PROPOFOL 30 MG: 10 INJECTION, EMULSION INTRAVENOUS at 14:21

## 2019-11-01 RX ADMIN — LIDOCAINE HYDROCHLORIDE 60 MG: 20 INJECTION, SOLUTION INTRAVENOUS at 14:15

## 2019-11-01 RX ADMIN — PROPOFOL 30 MG: 10 INJECTION, EMULSION INTRAVENOUS at 14:19

## 2019-11-01 RX ADMIN — PROPOFOL 80 MG: 10 INJECTION, EMULSION INTRAVENOUS at 14:15

## 2019-11-01 RX ADMIN — DEXTROSE AND SODIUM CHLORIDE 30 ML/HR: 5; 450 INJECTION, SOLUTION INTRAVENOUS at 13:51

## 2019-11-01 NOTE — ANESTHESIA PREPROCEDURE EVALUATION
Anesthesia Evaluation     Patient summary reviewed and Nursing notes reviewed   NPO Solid Status: > 8 hours  NPO Liquid Status: > 8 hours           Airway   Mallampati: III  TM distance: >3 FB  Neck ROM: full  Possible difficult intubation  Dental - normal exam     Pulmonary - normal exam   (+) shortness of breath, recent URI (Patient states he has had some recent sinus drainage that has cleared) resolved,   Cardiovascular - normal exam  Exercise tolerance: poor (<4 METS)    ECG reviewed    (+) hypertension 2 medications or greater,     ROS comment: EKG 10/31/19:  NSR    Patient had cardiology visit and has ECHO scheduled    Heart cath 2014 negative per cardiology note      Patient denies any recent CP    Neuro/Psych  (+) headaches, numbness, psychiatric history,     GI/Hepatic/Renal/Endo    (+) obesity, morbid obesity,  renal disease, diabetes mellitus,     Musculoskeletal     Abdominal   (+) obese,    Substance History      OB/GYN          Other          Other Comment: Male to female gender transformation. Patient currently on estrogen                  Anesthesia Plan    ASA 3     MAC     intravenous induction   Anesthetic plan, all risks, benefits, and alternatives have been provided, discussed and informed consent has been obtained with: patient.    Plan discussed with CRNA.

## 2019-11-01 NOTE — INTERVAL H&P NOTE
Risks and benefits discussed with patient.  Patient verbalized understanding and would like to proceed with procedure.  H&P reviewed. The patient was examined and there are no changes to the H&P.

## 2019-11-01 NOTE — ANESTHESIA POSTPROCEDURE EVALUATION
Patient: Rupesh Valencia Jr    Procedure Summary     Date:  11/01/19 Room / Location:  [facility] ENDOSCOPY 1 / [facility] ENDOSCOPY    Anesthesia Start:  1413 Anesthesia Stop:  1425    Procedure:  COLONOSCOPY (N/A ) Diagnosis:       Encounter for screening for malignant neoplasm of colon      (Encounter for screening for malignant neoplasm of colon [Z12.11])    Surgeon:  Srinivas Forte MD Provider:  Claudia Burrows CRNA    Anesthesia Type:  MAC ASA Status:  3          Anesthesia Type: MAC  Last vitals  BP   93/56 (11/01/19 1343)   Temp   97.4 °F (36.3 °C) (11/01/19 1343)   Pulse   77 (11/01/19 1343)   Resp   18 (11/01/19 1343)     SpO2   100 % (11/01/19 1343)     Post Anesthesia Care and Evaluation    Patient location during evaluation: bedside  Patient participation: complete - patient participated  Level of consciousness: awake  Pain score: 0  Pain management: adequate  Airway patency: patent  Anesthetic complications: No anesthetic complications  PONV Status: none  Cardiovascular status: acceptable and hemodynamically stable  Respiratory status: acceptable  Hydration status: acceptable  Post Neuraxial Block status: Motor and sensory function returned to baseline

## 2019-11-04 LAB
LAB AP CASE REPORT: NORMAL
PATH REPORT.FINAL DX SPEC: NORMAL
PATH REPORT.GROSS SPEC: NORMAL

## 2019-11-11 DIAGNOSIS — R06.00 DYSPNEA, UNSPECIFIED TYPE: Primary | ICD-10-CM

## 2019-11-14 ENCOUNTER — CONSULT (OUTPATIENT)
Dept: SLEEP MEDICINE | Facility: HOSPITAL | Age: 54
End: 2019-11-14

## 2019-11-14 ENCOUNTER — OFFICE VISIT (OUTPATIENT)
Dept: GASTROENTEROLOGY | Facility: CLINIC | Age: 54
End: 2019-11-14

## 2019-11-14 VITALS
BODY MASS INDEX: 46.37 KG/M2 | HEART RATE: 74 BPM | OXYGEN SATURATION: 98 % | WEIGHT: 261.7 LBS | HEIGHT: 63 IN | SYSTOLIC BLOOD PRESSURE: 130 MMHG | DIASTOLIC BLOOD PRESSURE: 74 MMHG

## 2019-11-14 VITALS
WEIGHT: 261.4 LBS | DIASTOLIC BLOOD PRESSURE: 78 MMHG | HEART RATE: 70 BPM | BODY MASS INDEX: 46.32 KG/M2 | HEIGHT: 63 IN | SYSTOLIC BLOOD PRESSURE: 122 MMHG

## 2019-11-14 DIAGNOSIS — D12.6 TUBULAR ADENOMA OF COLON: Primary | ICD-10-CM

## 2019-11-14 DIAGNOSIS — K76.0 FATTY LIVER: ICD-10-CM

## 2019-11-14 DIAGNOSIS — G47.33 OBSTRUCTIVE SLEEP APNEA, ADULT: Primary | ICD-10-CM

## 2019-11-14 PROCEDURE — 99213 OFFICE O/P EST LOW 20 MIN: CPT | Performed by: PHYSICIAN ASSISTANT

## 2019-11-14 PROCEDURE — 99204 OFFICE O/P NEW MOD 45 MIN: CPT | Performed by: INTERNAL MEDICINE

## 2019-11-14 RX ORDER — TOPIRAMATE 100 MG/1
CAPSULE, EXTENDED RELEASE ORAL
Refills: 5 | COMMUNITY
Start: 2019-11-05 | End: 2021-09-24 | Stop reason: ALTCHOICE

## 2019-11-14 NOTE — PROGRESS NOTES
New Patient Sleep Medicine Consultation    Encounter Date: 11/14/2019         Patient's PCP: Stephy Coello APRN  Referring provider: Stephy Coello APRN  Reason for consultation chief complaint: tired all the time, shortness of breath and easy fatigue.    Rupesh Valencia Jr is a 54 y.o. male whose bedtime is ~ 2711-6957. He  falls asleep after 5-10 minutes, and is up 3-5 times per night. He wakes up ~ 3918-0798. He endorses 6-8 hours of sleep. He drinks 0 cups of coffee, 0 teas, and 5 sodas per day. He drinks 1 pint of alcohol per week.  Rupesh Valencia Jr admits to snoring, unrestful sleep, High blod pressure, stop breathing during sleep, irritability, memory loss, Up to the bathroom at night and sleepy driving. He denies cataplexy, sleep paralysis, or hypnagogic hallucinations. He does not take sedatives or hypnotics. He has some  sleepiness with driving. He naps when unstimulated.    He is a never smoker.  He works in [employer] for many years until 2009 but currently is a .  His schedule is erratic and he works 8 to 14-hour shifts as needed.  He is noticed his fatigue and particularly his exercise stamina has dropped dramatically.    Past Medical History:   Diagnosis Date   • Acute sinusitis    • Benign essential hypertension    • Carpal tunnel syndrome    • Chest pain    • Chronic sinusitis    • Cough    • Diabetes mellitus without complication (CMS/HCC)     type II or unspecified type, not stated as uncontrolled      • Diarrhea of presumed infectious origin    • Dyspnea    • Dyspnea on exertion     Angina equivalent      • Dysuria    • Essential hypertension    • Gender dysphoria      transgendered, crossdresses      • Hyperkeratosis    • Hypogonadism in male 2/24/2017   • Impaired fasting blood sugar    • Inflamed seborrheic keratosis    • Male hypogonadism    • Male-to-female transgender person 2/24/2017   • Male-to-female transsexuality    • Migraine    • Morbid obesity due to  "excess calories (CMS/[facility]) 2/24/2017   • Obesity    • Pain in joint     unspecified   • Poisoning due to venomous spider    • Renal impairment    • Routine general medical examination at a health care facility    • Special screening for malignant neoplasm of prostate    • Vitamin D deficiency 2/24/2017     Social History     Socioeconomic History   • Marital status:      Spouse name: Not on file   • Number of children: Not on file   • Years of education: Not on file   • Highest education level: Not on file   Tobacco Use   • Smoking status: Never Smoker   • Smokeless tobacco: Never Used   Substance and Sexual Activity   • Alcohol use: Yes     Comment: occasional   • Drug use: No   • Sexual activity: Defer     Family History   Problem Relation Age of Onset   • Breast cancer Mother    • Diabetes Mother    • Diabetes Father    • Prostate cancer Father    • Skin cancer Father    single, no kids  , retired corrections  2 brothers and 1 sisters  Other family history + for: diabetes, heart disease, obesity  FH of sleep disorders: self and brother    [place] - 10    Review of Systems:  Constitutional: negative  Eyes: negative  Ears, nose, mouth, throat, and face: negative  Respiratory: positive for dyspnea on exertion and wheezing  Cardiovascular: negative  Gastrointestinal: negative  Genitourinary:negative  Integument/breast: negative  Hematologic/lymphatic: negative  Musculoskeletal:negative  Neurological: negative  Behavioral/Psych: negative  Endocrine: negative  Allergic/Immunologic: negative Patient advised to discuss any positive ROS with PCP.      Vitals:    11/14/19 0836   BP: 130/74   Pulse: 74   SpO2: 98%           11/14/19  0836   Weight: 119 kg (261 lb 11.2 oz)       Body mass index is 46.37 kg/m². Patient's Body mass index is 46.37 kg/m². BMI is above normal parameters. Recommendations include: referral to primary care.  Neck circumference: 17.5\"          General: Alert. Cooperative. Well " developed. No acute distress. 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 Symmetrical. Atraumatic.              Eyes: Sclera clear. No icterus. PERRLA. Normal EOM.             Ears: No deformities. Normal hearing.             Nose: No septal deviation. No drainage.          Throat: No oral lesions. No thrush. Moist mucous membranes. Trachea midline    Tongue is enlarged    Dentition is poor       Pharynx: Posterior pharyngeal pillars are narrow    Mallampati score of IV (only hard palate visible)    Pharynx is normal with unrermarkable tonsils   Chest Wall:  Normal shape. Symmetric expansion with respiration. No tenderness.          Lungs:  Clear to auscultation bilaterally. No wheezes. No rhonchi. No rales. Respirations regular, even and unlabored.            Heart:  Regular rhythm and normal rate. Normal S1 and S2. No murmur.     Abdomen:  Soft, non-tender and non-distended. Normal bowel sounds. No masses.  Extremities:  Moves all extremities well. No edema.           Pulses: Pulses palpable and equal bilaterally.               Skin: Dry. Intact. No bleeding. No rash.           Neuro: Moves all 4 extremities and cranial nerves grossly intact.  Psychiatric: Normal mood and affect.      Current Outpatient Medications:   •  albuterol sulfate  (90 Base) MCG/ACT inhaler, Inhale 2 puffs Every 4 (Four) Hours As Needed for Wheezing or Shortness of Air., Disp: 18 g, Rfl: 11  •  amitriptyline (ELAVIL) 50 MG tablet, 1 OR 2 TABLETS BY MOUTH AT BEDTIME AS NEEDED, Disp: 60 tablet, Rfl: 5  •  atenolol (TENORMIN) 25 MG tablet, TAKE 2 TABLETS BY MOUTH DAILY., Disp: 60 tablet, Rfl: 5  •  estradiol (ESTRACE) 2 MG tablet, Take 4 tablets by mouth Daily., Disp: 120 tablet, Rfl: 11  •  finasteride (PROSCAR) 5 MG tablet, Take 1 tablet by mouth Daily., Disp: 30 tablet, Rfl: 11  •  FLUoxetine (PROzac) 20 MG capsule, TAKE 1 CAPSULE BY MOUTH DAILY., Disp: 30  "capsule, Rfl: 5  •  isosorbide mononitrate (IMDUR) 30 MG 24 hr tablet, Take 1 tablet by mouth Daily., Disp: 90 tablet, Rfl: 1  •  levothyroxine (SYNTHROID, LEVOTHROID) 25 MCG tablet, Take 1 tablet by mouth Daily., Disp: 90 tablet, Rfl: 3  •  losartan-hydrochlorothiazide (HYZAAR) 50-12.5 MG per tablet, TAKE 1 TABLET BY MOUTH DAILY., Disp: 30 tablet, Rfl: 5  •  metFORMIN ER (GLUCOPHAGE-XR) 500 MG 24 hr tablet, Take 1 tablet by mouth Daily With Breakfast., Disp: 90 tablet, Rfl: 1  •  mupirocin (BACTROBAN) 2 % ointment, Apply  topically 2 (Two) Times a Day., Disp: 30 g, Rfl: 5  •  nystatin (MYCOSTATIN) [id number] UNIT/GM powder, APPLY TOPICALLY 2 (TWO) TIMES A DAY., Disp: 45 g, Rfl: 0  •  potassium chloride (K-DUR,KLOR-CON) 20 MEQ CR tablet, TAKE ONE TABLET BY MOUTH EVERY DAY, Disp: 30 tablet, Rfl: 11  •  Syringe/Needle, Disp, (SYRINGE LUER LOCK) 25G X 1\" 3 ML misc, 1 application Every 30 (Thirty) Days., Disp: 50 each, Rfl: 12    Current Facility-Administered Medications:   •  cyanocobalamin injection 1,000 mcg, 1,000 mcg, Intramuscular, Q28 Days, Samia Gonzalez, APRN, 1,000 mcg at 08/10/18 1611    Lab Results   Component Value Date    WBC 9.52 10/11/2019    HGB 14.1 10/11/2019    HCT 40.0 10/11/2019    MCV 90.7 10/11/2019     10/11/2019     Lab Results   Component Value Date    GLUCOSE 108 (H) 10/11/2019    CALCIUM 9.8 10/11/2019     10/11/2019    K 4.3 10/11/2019    CO2 28.0 10/11/2019     10/11/2019    BUN 18 10/11/2019    CREATININE 1.39 (H) 10/11/2019    EGFRIFNONA 53 (L) 10/11/2019    BCR 12.9 10/11/2019    ANIONGAP 9.0 10/11/2019     No results found for: INR, PROTIME  Lab Results   Component Value Date    CKTOTAL 63 07/08/2015    CKMB 0.5 07/08/2015    TROPONINI <0.012 07/08/2015       No results found for: PHART, [id number], PO2ART]    Contraindications to home sleep test: Patient has a history of obstructive sleep apnea, and uncontrolled on current settings    Assessment and " Plan:    1. Obstructive sleep apnea progression or side effects of treatment and New (to me), additional work-up planned (4)  1. Check split night study  2. Pickwickain syndrome  1. CO2 of 28 with exertional dyspnea and morbid obesity  2. Check split night study    Total visit time 30 min, with total of 20 minutes spent face-to-face counseling patient extensively regarding: PAP therapy, Lifestyle modifications and Sleep hygiene and weight loss      RTC 2 weeks after testing         This document has been electronically signed by Sivakumar Malcolm MD on November 14, 2019         CC: Stephy Coello APRN Ward, Crystal Lynn, APRN

## 2019-11-14 NOTE — PATIENT INSTRUCTIONS

## 2019-11-14 NOTE — PROGRESS NOTES
Chief Complaint   Patient presents with   • Fatty Liver   • Screening Colonoscopy Results       ENDO PROCEDURE ORDERED:    Subjective    Rupesh Valencia Jr is a 54 y.o. male. he is here today for follow-up.    History of Present Illness    The patient was seen on a recheck of his fatty liver, colonoscopy.  Last seen 10/29/2019.  Patient underwent colonoscopy on 11/01/2019.  This showed a polyp at the ascending colon. This was a tubular adenoma.  Also showed hemorrhoids.  Recommended repeat colonoscopy at 3 years.    Patient currently denies abdominal pain, heartburn, nausea, vomiting.  Bowels are moving without blood or mucus.  Weight is down 6.6 pounds since last visit.    Patient with adenomatous polyp, encouraged high fiber diet.  I again discussed fatty liver diet with the patient.  I had made some suggestions in my last note regarding additional testing given his previous finding of a fatty liver.  He was encouraged to continue dietary modification and weight loss.  Encouraged to avoid fructose.  I told the patient I would be happy to see him back as needed if further evaluation of his liver is requested.  As long as he is doing well, we will plan followup with Stephy.       The following portions of the patient's history were reviewed and updated as appropriate:   Past Medical History:   Diagnosis Date   • Acute sinusitis    • Benign essential hypertension    • Carpal tunnel syndrome    • Chest pain    • Chronic sinusitis    • Cough    • Diabetes mellitus without complication (CMS/HCC)     type II or unspecified type, not stated as uncontrolled      • Diarrhea of presumed infectious origin    • Dyspnea    • Dyspnea on exertion     Angina equivalent      • Dysuria    • Essential hypertension    • Gender dysphoria      transgendered, crossdresses      • Hyperkeratosis    • Hypogonadism in male 2/24/2017   • Impaired fasting blood sugar    • Inflamed seborrheic keratosis    • Male hypogonadism    •  Male-to-female transgender person 2/24/2017   • Male-to-female transsexuality    • Migraine    • Morbid obesity due to excess calories (CMS/HCC) 2/24/2017   • Obesity    • Pain in joint     unspecified   • Poisoning due to venomous spider    • Renal impairment    • Routine general medical examination at a health care facility    • Special screening for malignant neoplasm of prostate    • Vitamin D deficiency 2/24/2017     Past Surgical History:   Procedure Laterality Date   • CARDIAC CATHETERIZATION  07/09/2015    Normal coronaries. Normal LV systolic and diastolic function.   • COLONOSCOPY N/A 11/1/2019    Procedure: COLONOSCOPY;  Surgeon: Srinivas Forte MD;  Location: [facility] ENDOSCOPY;  Service: Gastroenterology   • EYE SURGERY      x3   • INJECTION OF MEDICATION  11/16/2015    Depo Medrol (Methylprednisone) 80mg (1)     • INJECTION OF MEDICATION  03/24/2014    Inj(s) Tend-Sheath, Ligament, Single 21025 (2)    • INJECTION OF MEDICATION  03/24/2014    Kenalog (2)     • INJECTION OF MEDICATION  07/14/2016    Rocephin (2)     Family History   Problem Relation Age of Onset   • Breast cancer Mother    • Diabetes Mother    • Diabetes Father    • Prostate cancer Father    • Skin cancer Father        Allergies   Allergen Reactions   • Augmentin [Amoxicillin-Pot Clavulanate] Diarrhea     Diarrhea      Social History     Socioeconomic History   • Marital status:      Spouse name: Not on file   • Number of children: Not on file   • Years of education: Not on file   • Highest education level: Not on file   Tobacco Use   • Smoking status: Never Smoker   • Smokeless tobacco: Never Used   Substance and Sexual Activity   • Alcohol use: Yes     Comment: occasional   • Drug use: No   • Sexual activity: Defer     Current Medications:  Prior to Admission medications    Medication Sig Start Date End Date Taking? Authorizing Provider   albuterol sulfate  (90 Base) MCG/ACT inhaler Inhale 2 puffs Every 4 (Four) Hours  "As Needed for Wheezing or Shortness of Air. 10/22/19   Stephy Coello APRN   amitriptyline (ELAVIL) 50 MG tablet 1 OR 2 TABLETS BY MOUTH AT BEDTIME AS NEEDED 1/31/19   Samia Gonzalez APRN   atenolol (TENORMIN) 25 MG tablet TAKE 2 TABLETS BY MOUTH DAILY. 7/1/19   Samia oGnzalez APRN   estradiol (ESTRACE) 2 MG tablet Take 4 tablets by mouth Daily. 6/27/19   Fausto Malone MD   finasteride (PROSCAR) 5 MG tablet Take 1 tablet by mouth Daily. 6/27/19   Fausto Malone MD   FLUoxetine (PROzac) 20 MG capsule TAKE 1 CAPSULE BY MOUTH DAILY. 1/31/19   Samia Gonzalez APRN   isosorbide mononitrate (IMDUR) 30 MG 24 hr tablet Take 1 tablet by mouth Daily. 10/22/19   Stephy Coello APRN   levothyroxine (SYNTHROID, LEVOTHROID) 25 MCG tablet Take 1 tablet by mouth Daily. 10/12/19   Stephy Coello APRN   losartan-hydrochlorothiazide (HYZAAR) 50-12.5 MG per tablet TAKE 1 TABLET BY MOUTH DAILY. 1/31/19   Samia Gonzalez APRN   metFORMIN ER (GLUCOPHAGE-XR) 500 MG 24 hr tablet Take 1 tablet by mouth Daily With Breakfast. 10/28/19   Stephy Coello APRN   mupirocin (BACTROBAN) 2 % ointment Apply  topically 2 (Two) Times a Day. 3/24/17   Nikki Live APRN   nystatin (MYCOSTATIN) 572627 UNIT/GM powder APPLY TOPICALLY 2 (TWO) TIMES A DAY. 4/10/18   Priscilla Viera APRN   potassium chloride (K-DUR,KLOR-CON) 20 MEQ CR tablet TAKE ONE TABLET BY MOUTH EVERY DAY 8/1/19   Fausto Malone MD   Syringe/Needle, Disp, (SYRINGE LUER LOCK) 25G X 1\" 3 ML misc 1 application Every 30 (Thirty) Days. 9/7/18   Samia Gonzalez APRN   Topiramate  MG capsule extended-release 24 hour sprinkle  11/5/19   Provider, MD Terrell   fluticasone (FLONASE) 50 MCG/ACT nasal spray 1 spray into the nostril(s) as directed by provider Daily. Spray in each nostril. 11/27/18 11/14/19  Samia Gonzalez, JOHNNY     Review of Systems  Review of Systems       Objective    /78 (BP Location: Left arm)   " "Pulse 70   Ht 160 cm (63\")   Wt 119 kg (261 lb 6.4 oz)   BMI 46.30 kg/m²   Physical Exam   Constitutional: He is oriented to person, place, and time. He appears well-developed and well-nourished. No distress.   HENT:   Head: Normocephalic and atraumatic.   Eyes: EOM are normal. Pupils are equal, round, and reactive to light.   Neck: Normal range of motion.   Cardiovascular: Normal rate, regular rhythm and normal heart sounds.   Pulmonary/Chest: Effort normal and breath sounds normal.   Abdominal: Soft. Bowel sounds are normal. He exhibits no shifting dullness, no distension, no abdominal bruit, no ascites and no mass. There is no hepatosplenomegaly. There is tenderness. There is no rigidity, no rebound, no guarding and no CVA tenderness. No hernia. Hernia confirmed negative in the ventral area.   mild   Musculoskeletal: Normal range of motion.   Neurological: He is alert and oriented to person, place, and time.   Skin: Skin is warm and dry.   Psychiatric: He has a normal mood and affect. His behavior is normal. Judgment and thought content normal.   Nursing note and vitals reviewed.    Assessment/Plan      1. Tubular adenoma of colon    2. Fatty liver    .   Rupesh was seen today for fatty liver and screening colonoscopy results.    Diagnoses and all orders for this visit:    Tubular adenoma of colon    Fatty liver        Orders placed during this encounter include:  No orders of the defined types were placed in this encounter.      Medications prescribed:  No orders of the defined types were placed in this encounter.      Requested Prescriptions      No prescriptions requested or ordered in this encounter       Review and/or summary of lab tests, radiology, procedures, medications. Review and summary of old records and obtaining of history. The risks and benefits of my recommendations, as well as other treatment options were discussed with the patient today. Questions were answered.    Follow-up: Return if " symptoms worsen or fail to improve, for Next scheduled follow up.     * Surgery not found *      This document has been electronically signed by Gonzales Cortes PA-C on November 21, 2019 3:00 PM      Results for orders placed or performed during the hospital encounter of 11/01/19   Tissue Pathology Exam   Result Value Ref Range    Case Report       Surgical Pathology Report                         Case: [id number]                                  Authorizing Provider:  Srinivas Forte MD      Collected:           11/01/2019 02:21 PM          Ordering Location:     [facility]             Received:            11/01/2019 02:41 PM                                 [facility]                                                     Pathologist:           Khoi Bo MD                                                        Specimen:    Large Intestine, Right / Ascending Colon, polyp                                            Final Diagnosis       TUBULAR ADENOMA, ASCENDING COLON.      Gross Description       The specimen consists of a mucosal biopsy measuring up to 0.3 cm in greatest dimension.  All embedded.     Results for orders placed or performed in visit on 10/22/19   Urinalysis without microscopic (no culture) - Urine, Clean Catch   Result Value Ref Range    Color, UA Yellow Yellow, Straw    Appearance, UA Clear Clear    pH, UA <=5.0 (L) 5.5 - 8.0    Specific Gravity, UA 1.025 1.005 - 1.030    Glucose, UA Negative Negative    Ketones, UA Negative Negative    Bilirubin, UA Negative Negative    Blood, UA Trace (A) Negative    Protein, UA Negative Negative    Leuk Esterase, UA Negative Negative    Nitrite, UA Negative Negative    Urobilinogen, UA 0.2 E.U./dL 0.2 - 1.0 E.U./dL   Results for orders placed or performed in visit on 10/22/19   Insulin, Free & Total, Serum   Result Value Ref Range    Insulin, Free 71 (H) uU/mL    Insulin 78 uU/mL   Results for orders placed or performed in visit on  10/11/19   PSA Screen   Result Value Ref Range    PSA 0.040 0.000 - 4.000 ng/mL   FSH & LH   Result Value Ref Range    FSH 3.64 mIU/mL    LH 7.84 mIU/mL   CBC Auto Differential   Result Value Ref Range    WBC 9.52 3.40 - 10.80 10*3/mm3    RBC 4.41 4.14 - 5.80 10*6/mm3    Hemoglobin 14.1 13.0 - 17.7 g/dL    Hematocrit 40.0 37.5 - 51.0 %    MCV 90.7 79.0 - 97.0 fL    MCH 32.0 26.6 - 33.0 pg    MCHC 35.3 31.5 - 35.7 g/dL    RDW 13.0 12.3 - 15.4 %    RDW-SD 42.4 37.0 - 54.0 fl    MPV 9.4 6.0 - 12.0 fL    Platelets 277 140 - 450 10*3/mm3    Neutrophil % 74.2 42.7 - 76.0 %    Lymphocyte % 14.0 (L) 19.6 - 45.3 %    Monocyte % 7.8 5.0 - 12.0 %    Eosinophil % 3.8 0.3 - 6.2 %    Basophil % 0.2 0.0 - 1.5 %    Neutrophils, Absolute 7.07 (H) 1.70 - 7.00 10*3/mm3    Lymphocytes, Absolute 1.33 0.70 - 3.10 10*3/mm3    Monocytes, Absolute 0.74 0.10 - 0.90 10*3/mm3    Eosinophils, Absolute 0.36 0.00 - 0.40 10*3/mm3    Basophils, Absolute 0.02 0.00 - 0.20 10*3/mm3   Thyroid Peroxidase Antibody   Result Value Ref Range    Thyroid Peroxidase Antibody 18 0 - 34 IU/mL   Insulin, Free & Total, Serum   Result Value Ref Range    Insulin, Free      Insulin     Estradiol   Result Value Ref Range    Estradiol 142.0 pg/mL   Anti-Thyroglobulin Antibody   Result Value Ref Range    Thyroglobulin Ab <1.0 0.0 - 0.9 IU/mL   T3   Result Value Ref Range    T3, Total 144.0 80.0 - 200.0 ng/dl   TSH   Result Value Ref Range    TSH 6.230 (H) 0.270 - 4.200 uIU/mL   T4, Free   Result Value Ref Range    Free T4 0.92 (L) 0.93 - 1.70 ng/dL   Testosterone   Result Value Ref Range    Testosterone, Total 236.00 193.00 - 740.00 ng/dL   Hemoglobin A1c   Result Value Ref Range    Hemoglobin A1C 5.59 4.80 - 5.60 %   Lipid Panel   Result Value Ref Range    Total Cholesterol 183 150 - 200 mg/dL    Triglycerides 86 <=150 mg/dL    HDL Cholesterol 69 (H) 40 - 59 mg/dL    LDL Cholesterol  97 <=100 mg/dL    VLDL Cholesterol 17.2 mg/dL    LDL/HDL Ratio 1.40 0.00 - 3.55    Comprehensive Metabolic Panel   Result Value Ref Range    Glucose 108 (H) 70 - 99 mg/dL    BUN 18 7 - 23 mg/dL    Creatinine 1.39 (H) 0.70 - 1.30 mg/dL    Sodium 138 137 - 145 mmol/L    Potassium 4.3 3.4 - 5.0 mmol/L    Chloride 101 101 - 112 mmol/L    CO2 28.0 22.0 - 30.0 mmol/L    Calcium 9.8 8.4 - 10.2 mg/dL    Total Protein 7.6 6.3 - 8.6 g/dL    Albumin 3.90 3.50 - 5.00 g/dL    ALT (SGPT) 16 <=50 U/L    AST (SGOT) 22 17 - 59 U/L    Alkaline Phosphatase 124 38 - 126 U/L    Total Bilirubin 0.7 0.2 - 1.3 mg/dL    eGFR Non  Amer 53 (L) 56 - 130 mL/min/1.73    Globulin 3.7 (H) 2.3 - 3.5 gm/dL    A/G Ratio 1.1 1.1 - 1.8 g/dL    BUN/Creatinine Ratio 12.9 7.0 - 25.0    Anion Gap 9.0 5.0 - 15.0 mmol/L   Results for orders placed or performed in visit on 06/27/19   FSH & LH   Result Value Ref Range    FSH 3.07 mIU/mL    LH 9.60 mIU/mL   CBC Auto Differential   Result Value Ref Range    WBC 12.16 (H) 3.40 - 10.80 10*3/mm3    RBC 4.51 4.14 - 5.80 10*6/mm3    Hemoglobin 14.4 13.0 - 17.7 g/dL    Hematocrit 40.8 37.5 - 51.0 %    MCV 90.5 79.0 - 97.0 fL    MCH 31.9 26.6 - 33.0 pg    MCHC 35.3 31.5 - 35.7 g/dL    RDW 14.1 12.3 - 15.4 %    RDW-SD 45.3 37.0 - 54.0 fl    MPV 9.4 6.0 - 12.0 fL    Platelets 305 140 - 450 10*3/mm3   Dihydrotestosterone   Result Value Ref Range    Dihydrotestosterone 4.5 (L) ng/dL   Estradiol   Result Value Ref Range    Estradiol 153.0 pg/mL   Manual Differential   Result Value Ref Range    Neutrophil % 67.0 42.7 - 76.0 %    Lymphocyte % 22.0 19.6 - 45.3 %    Monocyte % 7.0 5.0 - 12.0 %    Eosinophil % 4.0 0.3 - 6.2 %    Neutrophils Absolute 8.15 (H) 1.70 - 7.00 10*3/mm3    Lymphocytes Absolute 2.68 0.70 - 3.10 10*3/mm3    Monocytes Absolute 0.85 0.10 - 0.90 10*3/mm3    Eosinophils Absolute 0.49 (H) 0.00 - 0.40 10*3/mm3    Anisocytosis Slight/1+ None Seen    WBC Morphology Normal Normal    Platelet Estimate Adequate Normal     *Note: Due to a large number of results and/or encounters for the  requested time period, some results have not been displayed. A complete set of results can be found in Results Review.       Some portions of this note have been dictated using voice recognition software and may contain errors and/or omissions.

## 2019-11-21 ENCOUNTER — LAB (OUTPATIENT)
Dept: LAB | Facility: OTHER | Age: 54
End: 2019-11-21

## 2019-11-21 DIAGNOSIS — D72.829 LEUKOCYTOSIS, UNSPECIFIED TYPE: ICD-10-CM

## 2019-11-21 LAB
BACTERIA UR QL AUTO: ABNORMAL /HPF
BILIRUB UR QL STRIP: NEGATIVE
CLARITY UR: CLEAR
COLOR UR: ABNORMAL
GLUCOSE UR STRIP-MCNC: NEGATIVE MG/DL
HGB UR QL STRIP.AUTO: NEGATIVE
HYALINE CASTS UR QL AUTO: ABNORMAL /LPF
KETONES UR QL STRIP: NEGATIVE
LEUKOCYTE ESTERASE UR QL STRIP.AUTO: NEGATIVE
NITRITE UR QL STRIP: NEGATIVE
PH UR STRIP.AUTO: <=5 [PH] (ref 5.5–8)
PROT UR QL STRIP: NEGATIVE
RBC # UR: ABNORMAL /HPF
REF LAB TEST METHOD: ABNORMAL
SP GR UR STRIP: 1.02 (ref 1–1.03)
SQUAMOUS #/AREA URNS HPF: ABNORMAL /HPF
UROBILINOGEN UR QL STRIP: ABNORMAL
WBC UR QL AUTO: ABNORMAL /HPF

## 2019-11-21 PROCEDURE — 81001 URINALYSIS AUTO W/SCOPE: CPT | Performed by: NURSE PRACTITIONER

## 2019-11-24 ENCOUNTER — APPOINTMENT (OUTPATIENT)
Dept: SLEEP MEDICINE | Facility: HOSPITAL | Age: 54
End: 2019-11-24

## 2019-11-25 ENCOUNTER — OFFICE VISIT (OUTPATIENT)
Dept: PULMONOLOGY | Facility: CLINIC | Age: 54
End: 2019-11-25

## 2019-11-25 ENCOUNTER — PROCEDURE VISIT (OUTPATIENT)
Dept: PULMONOLOGY | Facility: CLINIC | Age: 54
End: 2019-11-25

## 2019-11-25 ENCOUNTER — OFFICE VISIT (OUTPATIENT)
Dept: FAMILY MEDICINE CLINIC | Facility: CLINIC | Age: 54
End: 2019-11-25

## 2019-11-25 VITALS
SYSTOLIC BLOOD PRESSURE: 128 MMHG | RESPIRATION RATE: 16 BRPM | DIASTOLIC BLOOD PRESSURE: 77 MMHG | OXYGEN SATURATION: 99 % | HEIGHT: 63 IN | TEMPERATURE: 98.2 F | WEIGHT: 260 LBS | BODY MASS INDEX: 46.07 KG/M2 | HEART RATE: 67 BPM

## 2019-11-25 VITALS
BODY MASS INDEX: 46.07 KG/M2 | HEART RATE: 69 BPM | HEIGHT: 63 IN | WEIGHT: 260 LBS | OXYGEN SATURATION: 98 % | DIASTOLIC BLOOD PRESSURE: 75 MMHG | SYSTOLIC BLOOD PRESSURE: 119 MMHG

## 2019-11-25 DIAGNOSIS — F41.9 ANXIETY: ICD-10-CM

## 2019-11-25 DIAGNOSIS — R06.02 SHORTNESS OF BREATH: Primary | ICD-10-CM

## 2019-11-25 DIAGNOSIS — E66.01 MORBID OBESITY WITH BMI OF 45.0-49.9, ADULT (HCC): ICD-10-CM

## 2019-11-25 DIAGNOSIS — J45.20 MILD INTERMITTENT ASTHMA WITHOUT COMPLICATION: ICD-10-CM

## 2019-11-25 DIAGNOSIS — R06.09 DYSPNEA ON EXERTION: Primary | ICD-10-CM

## 2019-11-25 DIAGNOSIS — J30.9 CHRONIC ALLERGIC RHINITIS: ICD-10-CM

## 2019-11-25 DIAGNOSIS — I10 ESSENTIAL HYPERTENSION: Primary | ICD-10-CM

## 2019-11-25 PROBLEM — J34.89 NASAL SEPTAL PERFORATION: Status: ACTIVE | Noted: 2019-11-25

## 2019-11-25 PROCEDURE — 94060 EVALUATION OF WHEEZING: CPT | Performed by: INTERNAL MEDICINE

## 2019-11-25 PROCEDURE — 94727 GAS DIL/WSHOT DETER LNG VOL: CPT | Performed by: INTERNAL MEDICINE

## 2019-11-25 PROCEDURE — 99204 OFFICE O/P NEW MOD 45 MIN: CPT | Performed by: INTERNAL MEDICINE

## 2019-11-25 PROCEDURE — 94729 DIFFUSING CAPACITY: CPT | Performed by: INTERNAL MEDICINE

## 2019-11-25 PROCEDURE — 99213 OFFICE O/P EST LOW 20 MIN: CPT | Performed by: NURSE PRACTITIONER

## 2019-11-25 RX ORDER — MONTELUKAST SODIUM 10 MG/1
10 TABLET ORAL NIGHTLY
Qty: 30 TABLET | Refills: 11 | Status: SHIPPED | OUTPATIENT
Start: 2019-11-25 | End: 2020-08-14 | Stop reason: SDUPTHER

## 2019-11-25 RX ORDER — CLOMIPRAMINE HYDROCHLORIDE 50 MG/1
50 CAPSULE ORAL NIGHTLY
Qty: 50 CAPSULE | Refills: 3 | Status: SHIPPED | OUTPATIENT
Start: 2019-11-25 | End: 2020-04-30

## 2019-11-25 RX ORDER — LOSARTAN POTASSIUM 50 MG/1
50 TABLET ORAL DAILY
Qty: 30 TABLET | Refills: 0 | Status: SHIPPED | OUTPATIENT
Start: 2019-11-25 | End: 2019-12-26

## 2019-11-25 NOTE — PROGRESS NOTES
"Chief Complaint   Patient presents with   • kidney problems     Subjective   Rupesh Valencia Jr is a 54 y.o. male to female transgender person, who presents to the office for kidney problems.    The following portions of the patient's history were reviewed and updated as appropriate: allergies, current medications, past family history, past medical history, past social history, past surgical history and problem list.    History of Present Illness     Abnormal renal function tests.   Hx  7mm renal stone that has not passed since identified. No dysuria, no hematuria, no flank pain and no decreased volume of urine flow.   Has had mild  Elevation of Creatinine for past 2 years, now is just above normal. However, her eGFR has been 53 and 52 for the past 4 months.  No nephrotoxic meds have been added recently, has been on losartan/ HCTZ at same dose for about 2 years.   Denies any OTC NSAID use.   Metformin was only started in October at a low dose, so shouldn't have been responsible for increasing problems over past several months.  Agreeable to try losartan plain and stop the HCTZ component of blood pressure medication.     Anxiety: having more and more worries than she feels is warranted by her concerns, over home repairs and family wellness. Reports depression well controlled with fluoxetine and current amitriptyline helps with \"nervous stomach\" which before the amitriptyline led to frequent episodes of diarrhea, now well controlled.   Is agreeable to stop amitriptyline and replace this with clomipramine for the more obsessive anxiety symptoms.       Past Medical History:   Diagnosis Date   • Acute sinusitis    • Benign essential hypertension    • Carpal tunnel syndrome    • Chest pain    • Chronic sinusitis    • Cough    • Diabetes mellitus without complication (CMS/HCC)     type II or unspecified type, not stated as uncontrolled      • Diarrhea of presumed infectious origin    • Dyspnea    • Dyspnea on exertion  "    Angina equivalent      • Dysuria    • Essential hypertension    • Gender dysphoria      transgendered, crossdresses      • Hyperkeratosis    • Hypogonadism in male 2/24/2017   • Impaired fasting blood sugar    • Inflamed seborrheic keratosis    • Male hypogonadism    • Male-to-female transgender person 2/24/2017   • Male-to-female transsexuality    • Migraine    • Morbid obesity due to excess calories (CMS/HCC) 2/24/2017   • Obesity    • Pain in joint     unspecified   • Poisoning due to venomous spider    • Renal impairment    • Routine general medical examination at a health care facility    • Special screening for malignant neoplasm of prostate    • Vitamin D deficiency 2/24/2017          Family History   Problem Relation Age of Onset   • Breast cancer Mother    • Diabetes Mother    • Diabetes Father    • Prostate cancer Father    • Skin cancer Father         Review of Systems   Constitutional: Negative.  Negative for chills, fatigue, fever and unexpected weight change.   HENT: Negative for congestion, rhinorrhea, sinus pressure, sneezing, sore throat, tinnitus and trouble swallowing.    Eyes: Negative.  Negative for visual disturbance.   Respiratory: Negative.  Negative for cough, chest tightness, shortness of breath and wheezing.    Cardiovascular: Negative.  Negative for chest pain, palpitations and leg swelling.   Gastrointestinal: Negative.  Negative for abdominal pain, constipation, diarrhea, nausea and vomiting.   Endocrine: Negative.    Genitourinary: Negative.  Negative for dysuria, frequency and urgency.   Musculoskeletal: Negative.  Negative for neck pain.   Skin: Negative.  Negative for color change, pallor, rash and wound.   Allergic/Immunologic: Negative.    Neurological: Negative.  Negative for dizziness, weakness and headaches.   Hematological: Negative.    Psychiatric/Behavioral: Negative for sleep disturbance and suicidal ideas. The patient is nervous/anxious.         In the past two weeks  "the pt has not felt down, depressed, hopeless or lost interest in doing things   All other systems reviewed and are negative.      Objective   Vitals:    11/25/19 1049   BP: 128/77   BP Location: Left arm   Patient Position: Sitting   Cuff Size: Adult   Pulse: 67   Resp: 16   Temp: 98.2 °F (36.8 °C)   TempSrc: Tympanic   SpO2: 99%   Weight: 118 kg (260 lb)   Height: 160 cm (63\")   PainSc: 0-No pain     Physical Exam   Constitutional: He is oriented to person, place, and time. He appears well-developed and well-nourished. He is cooperative.  Non-toxic appearance. He does not have a sickly appearance. He appears ill (chronically).   HENT:   Head: Normocephalic and atraumatic.   Right Ear: External ear normal.   Left Ear: External ear normal.   Nose: Mucosal edema and rhinorrhea present.   Mouth/Throat: Uvula is midline, oropharynx is clear and moist and mucous membranes are normal.   Eyes: Conjunctivae, EOM and lids are normal. Pupils are equal, round, and reactive to light. Right eye exhibits no discharge. Left eye exhibits no discharge. No scleral icterus.   Neck: Trachea normal, normal range of motion and phonation normal. Neck supple. No thyromegaly present.   Cardiovascular: Normal rate, regular rhythm, normal heart sounds and intact distal pulses. Exam reveals no gallop and no friction rub.   No murmur heard.  Pulmonary/Chest: Effort normal and breath sounds normal. No respiratory distress. He has no wheezes. He has no rales. He exhibits no tenderness.   Abdominal: Soft. Bowel sounds are normal. He exhibits no distension. There is no tenderness. There is no rebound and no guarding.   Musculoskeletal: Normal range of motion. He exhibits no edema, tenderness or deformity.   Lymphadenopathy:     He has no cervical adenopathy.   Neurological: He is alert and oriented to person, place, and time. No cranial nerve deficit. GCS eye subscore is 4. GCS verbal subscore is 5. GCS motor subscore is 6.   Skin: Skin is warm, " dry and intact. Capillary refill takes 2 to 3 seconds. No rash noted. No erythema. No pallor.   Psychiatric: He has a normal mood and affect. His behavior is normal. Judgment and thought content normal.   Nursing note and vitals reviewed.      Assessment/Plan   Rupesh was seen today for kidney problems.    Diagnoses and all orders for this visit:    Essential hypertension  -     losartan (COZAAR) 50 MG tablet; Take 1 tablet by mouth Daily.    Anxiety  -     clomiPRAMINE (ANAFRANIL) 50 MG capsule; Take 1 capsule by mouth Every Night.           PHQ-2/PHQ-9 Depression Screening 11/25/2019   Little interest or pleasure in doing things 0   Feeling down, depressed, or hopeless 0   Trouble falling or staying asleep, or sleeping too much 0   Feeling tired or having little energy 0   Poor appetite or overeating 0   Feeling bad about yourself - or that you are a failure or have let yourself or your family down 0   Trouble concentrating on things, such as reading the newspaper or watching television 0   Moving or speaking so slowly that other people could have noticed. Or the opposite - being so fidgety or restless that you have been moving around a lot more than usual 0   Thoughts that you would be better off dead, or of hurting yourself in some way 0   Total Score 0   If you checked off any problems, how difficult have these problems made it for you to do your work, take care of things at home, or get along with other people? -       Stephy Coello, APRJONATHAN         Return in about 3 months (around 2/25/2020), or if symptoms worsen or fail to improve.    There are no Patient Instructions on file for this visit.

## 2019-11-25 NOTE — PROGRESS NOTES
"    Pulmonary Consultation    Coello, JOHNNY Rivera,    Thank you for asking me to see Rupesh Valencia Jr for   Chief Complaint   Patient presents with   • Shortness of Breath   .    Subjective     History of Present Illness  Rupesh Valencia Jr is a 54 y.o. male with a PMH significant for allergies, VIANCA, morbid obesity, hypertension, CKD stage III, and hypothyroidism who presents for evaluation of dyspnea.    11/25/19: Pt states he has dyspnea with walking which is relieved with rest. He admits that he could never run but he was able to walk for long distances without issues. Pt reports progressive SORIA for several years. He retired in 2009 and became sedentary with weight gain which seems to be around the time he noticed SORIA. Pt started driving a flatbed truck 3 years ago and was able to lose some weight with the increased activity. He noticed his breathing improved some but it did not resolve. Pt was given albuterol in the past for EIA primarily with the cold weather. He has been using his albuterol up to twice a day with benefit. Pt denies wheeze or chest pain. He had a cardiac cath a few years ago and was told he did not have any blockages but he was told his blood was \"sticky and could not get into the capillaries\". Pt admits to cough due to drainage and nasal congestion. He is a never smoker. Pt has worked as a  and  work on the side. His father had black lung and his paternal grandmother had asthma. He does have 6 chihuahuas at home.       Review of Systems: History obtained from chart review and the patient.  Review of Systems   Constitutional: Positive for fatigue.        Snoring, witnessed apneas, EDS   HENT: Positive for congestion and postnasal drip.    Respiratory: Positive for cough and shortness of breath. Negative for wheezing.    Cardiovascular: Negative for chest pain and leg swelling.   Musculoskeletal: Positive for arthralgias.   Neurological: Positive for " dizziness.   Psychiatric/Behavioral: The patient is nervous/anxious.      As described in the HPI. Otherwise, remainder of ROS (14 systems) were negative.    Patient Active Problem List   Diagnosis   • Male-to-female transgender person   • Hypogonadism in male   • Essential hypertension   • CKD (chronic kidney disease) stage 3, GFR 30-59 ml/min (CMS/[facility])   • Morbid obesity with BMI of 45.0-49.9, adult (CMS/[facility])   • Vitamin D deficiency   • Other fatigue   • Urinary urgency   • Chronic allergic rhinitis   • Tinnitus   • Encounter for screening for malignant neoplasm of colon   • Nasal septal perforation   • Mild intermittent asthma without complication         Current Outpatient Medications:   •  albuterol sulfate  (90 Base) MCG/ACT inhaler, Inhale 2 puffs Every 4 (Four) Hours As Needed for Wheezing or Shortness of Air., Disp: 18 g, Rfl: 11  •  atenolol (TENORMIN) 25 MG tablet, TAKE 2 TABLETS BY MOUTH DAILY., Disp: 60 tablet, Rfl: 5  •  clomiPRAMINE (ANAFRANIL) 50 MG capsule, Take 1 capsule by mouth Every Night., Disp: 50 capsule, Rfl: 3  •  estradiol (ESTRACE) 2 MG tablet, Take 4 tablets by mouth Daily., Disp: 120 tablet, Rfl: 11  •  finasteride (PROSCAR) 5 MG tablet, Take 1 tablet by mouth Daily., Disp: 30 tablet, Rfl: 11  •  FLUoxetine (PROzac) 20 MG capsule, TAKE 1 CAPSULE BY MOUTH DAILY., Disp: 30 capsule, Rfl: 5  •  isosorbide mononitrate (IMDUR) 30 MG 24 hr tablet, Take 1 tablet by mouth Daily., Disp: 90 tablet, Rfl: 1  •  levothyroxine (SYNTHROID, LEVOTHROID) 25 MCG tablet, Take 1 tablet by mouth Daily., Disp: 90 tablet, Rfl: 3  •  losartan (COZAAR) 50 MG tablet, Take 1 tablet by mouth Daily., Disp: 30 tablet, Rfl: 0  •  metFORMIN ER (GLUCOPHAGE-XR) 500 MG 24 hr tablet, Take 1 tablet by mouth Daily With Breakfast., Disp: 90 tablet, Rfl: 1  •  mupirocin (BACTROBAN) 2 % ointment, Apply  topically 2 (Two) Times a Day., Disp: 30 g, Rfl: 5  •  nystatin (MYCOSTATIN) 452589 UNIT/GM powder, APPLY TOPICALLY 2  "(TWO) TIMES A DAY., Disp: 45 g, Rfl: 0  •  potassium chloride (K-DUR,KLOR-CON) 20 MEQ CR tablet, TAKE ONE TABLET BY MOUTH EVERY DAY, Disp: 30 tablet, Rfl: 11  •  Syringe/Needle, Disp, (SYRINGE LUER LOCK) 25G X 1\" 3 ML misc, 1 application Every 30 (Thirty) Days., Disp: 50 each, Rfl: 12  •  Topiramate  MG capsule extended-release 24 hour sprinkle, , Disp: , Rfl: 5  •  Mometasone Furoate 100 MCG/ACT aerosol, Inhale 2 puffs 2 (Two) Times a Day., Disp: 1 inhaler, Rfl: 11  •  montelukast (SINGULAIR) 10 MG tablet, Take 1 tablet by mouth Every Night., Disp: 30 tablet, Rfl: 11    Current Facility-Administered Medications:   •  cyanocobalamin injection 1,000 mcg, 1,000 mcg, Intramuscular, Q28 Days, Samia Gonzalez, JOHNNY, 1,000 mcg at 08/10/18 1611    Allergies   Allergen Reactions   • Augmentin [Amoxicillin-Pot Clavulanate] Diarrhea     Diarrhea        Past Medical History:   Diagnosis Date   • Acute sinusitis    • Benign essential hypertension    • Carpal tunnel syndrome    • Chest pain    • Chronic sinusitis    • Cough    • Diabetes mellitus without complication (CMS/HCC)     type II or unspecified type, not stated as uncontrolled      • Diarrhea of presumed infectious origin    • Dyspnea    • Dyspnea on exertion     Angina equivalent      • Dysuria    • Essential hypertension    • Gender dysphoria      transgendered, crossdresses      • Hyperkeratosis    • Hypogonadism in male 2/24/2017   • Impaired fasting blood sugar    • Inflamed seborrheic keratosis    • Male hypogonadism    • Male-to-female transgender person 2/24/2017   • Male-to-female transsexuality    • Migraine    • Morbid obesity due to excess calories (CMS/HCC) 2/24/2017   • Obesity    • Pain in joint     unspecified   • Poisoning due to venomous spider    • Renal impairment    • Routine general medical examination at a health care facility    • Special screening for malignant neoplasm of prostate    • Vitamin D deficiency 2/24/2017     Past Surgical " "History:   Procedure Laterality Date   • CARDIAC CATHETERIZATION  07/09/2015    Normal coronaries. Normal LV systolic and diastolic function.   • COLONOSCOPY N/A 11/1/2019    Procedure: COLONOSCOPY;  Surgeon: Srinivas Forte MD;  Location: [facility] ENDOSCOPY;  Service: Gastroenterology   • EYE SURGERY      x3   • INJECTION OF MEDICATION  11/16/2015    Depo Medrol (Methylprednisone) 80mg (1)     • INJECTION OF MEDICATION  03/24/2014    Inj(s) Tend-Sheath, Ligament, Single 79469 (2)    • INJECTION OF MEDICATION  03/24/2014    Kenalog (2)     • INJECTION OF MEDICATION  07/14/2016    Rocephin (2)     Social History     Socioeconomic History   • Marital status:      Spouse name: Not on file   • Number of children: Not on file   • Years of education: Not on file   • Highest education level: Not on file   Tobacco Use   • Smoking status: Never Smoker   • Smokeless tobacco: Never Used   Substance and Sexual Activity   • Alcohol use: Yes     Comment: occasional   • Drug use: No   • Sexual activity: Defer     Family History   Problem Relation Age of Onset   • Breast cancer Mother    • Diabetes Mother    • Diabetes Father    • Prostate cancer Father    • Skin cancer Father           Objective     Blood pressure 119/75, pulse 69, height 160 cm (63\"), weight 118 kg (260 lb), SpO2 98 %.  Physical Exam   Constitutional: He is oriented to person, place, and time. Vital signs are normal. He appears well-developed and well-nourished.   Morbidly obese   HENT:   Head: Normocephalic and atraumatic.   Nose: Mucosal edema present.   Mouth/Throat: Uvula is midline, oropharynx is clear and moist and mucous membranes are normal.   Mallampati 3, septal perforation   Eyes: Conjunctivae, EOM and lids are normal. Pupils are equal, round, and reactive to light.   S/p bilateral corneal transplants   Neck: Trachea normal and normal range of motion. No tracheal tenderness present. No thyroid mass present.   Cardiovascular: Normal rate, " regular rhythm and normal heart sounds. PMI is not displaced. Exam reveals no gallop.   No murmur heard.  Pulmonary/Chest: Effort normal and breath sounds normal. No respiratory distress. He has no decreased breath sounds. He has no wheezes. He has no rhonchi. Chest wall is not dull to percussion. He exhibits no tenderness.   Abdominal: Soft. Normal appearance and bowel sounds are normal. There is no hepatomegaly. There is no tenderness.   Morbidly obese   Musculoskeletal:   Normal gait, no extremity edema     Vascular Status -  His right foot exhibits no edema. His left foot exhibits no edema.  Lymphadenopathy:        Head (right side): No submandibular adenopathy present.        Head (left side): No submandibular adenopathy present.     He has no cervical adenopathy.        Right: No supraclavicular adenopathy present.        Left: No supraclavicular adenopathy present.   Neurological: He is alert and oriented to person, place, and time.   Skin: Skin is warm and dry. No rash noted. No cyanosis. Nails show no clubbing.   Psychiatric: He has a normal mood and affect. His speech is normal and behavior is normal. Judgment normal.   Nursing note and vitals reviewed.      PFTs: 11/25/19 (independently reviewed and interpreted by me)  Ratio 55  FVC 3.28/ 86%  FEV1 181/ 62%  TLC 4.4/ 78%%  DLCO 11.14/   Moderate obstruction with no significant bronchodilator response.  Borderline reduced TLC suggesting concurrent mild restriction.  Moderately reduced diffusing capacity.  No comparative data available.    Radiology (independently reviewed and interpreted by me): CXR 11/21/19 showed NACPD       Assessment/Plan     Rupesh was seen today for shortness of breath.    Diagnoses and all orders for this visit:    Dyspnea on exertion    Mild intermittent asthma without complication  -     Mometasone Furoate 100 MCG/ACT aerosol; Inhale 2 puffs 2 (Two) Times a Day.    Chronic allergic rhinitis  -     montelukast (SINGULAIR) 10 MG  tablet; Take 1 tablet by mouth Every Night.    Morbid obesity with BMI of 45.0-49.9, adult (CMS/[facility])         Discussion/ Recommendations:   PFTs did show moderate obstruction as well as a pattern of restriction likely due to the patient's body habitus.  Chest x-ray was reviewed and was unremarkable.  I think the patient does have underlying asthma which is worsening over time likely secondary to persistent allergies as well as an element of deconditioning.  Given the frequency of his albuterol use, I would recommend initiating ICS at this time.  Also, he has some persistent allergies and I would recommend starting a leukotriene inhibitor.      -Start Asmanex 100 HFA, 2 puffs twice daily.  Sample and coupon provided.  Injected on use.  -Use albuterol only as needed for dyspnea or wheeze.  -Start montelukast nightly.  -Encouraged him to start using frequent nasal saline.  -Consider the addition of a nasal steroid but I hesitate this time due to septal perforation and report of epistaxis.  -Sleep study as recommended by Dr. Malcolm and follow-up in the sleep center for the results.    -Unable to take flu vaccine due to corneal transplants.    Patient's Body mass index is 46.06 kg/m². BMI is above normal parameters. Recommendations include: exercise counseling.           Return in about 4 weeks (around 12/23/2019) for Recheck asthma.      Thank you for allowing me to participate in the care of Rupesh Valencia Jr. Please do not hesitate to contact me with any questions.         This document has been electronically signed by Isabela Bojorquez MD on November 25, 2019 2:12 PM      Dictated using Dragon

## 2019-11-26 DIAGNOSIS — R06.09 DYSPNEA ON EXERTION: ICD-10-CM

## 2019-11-26 DIAGNOSIS — J30.9 CHRONIC ALLERGIC RHINITIS: ICD-10-CM

## 2019-11-26 DIAGNOSIS — J45.20 MILD INTERMITTENT ASTHMA WITHOUT COMPLICATION: ICD-10-CM

## 2019-12-02 LAB
BH CV ECHO MEAS - AO MAX PG (FULL): 0.18 MMHG
BH CV ECHO MEAS - AO MAX PG: 5.1 MMHG
BH CV ECHO MEAS - AO MEAN PG (FULL): 0 MMHG
BH CV ECHO MEAS - AO MEAN PG: 2 MMHG
BH CV ECHO MEAS - AO ROOT AREA (BSA CORRECTED): 1.5
BH CV ECHO MEAS - AO ROOT AREA: 8.6 CM^2
BH CV ECHO MEAS - AO ROOT DIAM: 3.3 CM
BH CV ECHO MEAS - AO V2 MAX: 113 CM/SEC
BH CV ECHO MEAS - AO V2 MEAN: 68.9 CM/SEC
BH CV ECHO MEAS - AO V2 VTI: 22.3 CM
BH CV ECHO MEAS - ASC AORTA: 2.9 CM
BH CV ECHO MEAS - AVA(I,A): 3.1 CM^2
BH CV ECHO MEAS - AVA(I,D): 3.1 CM^2
BH CV ECHO MEAS - AVA(V,A): 3.1 CM^2
BH CV ECHO MEAS - AVA(V,D): 3.1 CM^2
BH CV ECHO MEAS - BSA(HAYCOCK): 2.4 M^2
BH CV ECHO MEAS - BSA: 2.2 M^2
BH CV ECHO MEAS - BZI_BMI: 46.1 KILOGRAMS/M^2
BH CV ECHO MEAS - BZI_METRIC_HEIGHT: 160 CM
BH CV ECHO MEAS - BZI_METRIC_WEIGHT: 117.9 KG
BH CV ECHO MEAS - EDV(CUBED): 62.1 ML
BH CV ECHO MEAS - EDV(MOD-SP2): 91.6 ML
BH CV ECHO MEAS - EDV(MOD-SP4): 103 ML
BH CV ECHO MEAS - EDV(TEICH): 68.3 ML
BH CV ECHO MEAS - EF(CUBED): 76.9 %
BH CV ECHO MEAS - EF(MOD-SP2): 55 %
BH CV ECHO MEAS - EF(MOD-SP4): 59.5 %
BH CV ECHO MEAS - EF(TEICH): 69.6 %
BH CV ECHO MEAS - ESV(CUBED): 14.3 ML
BH CV ECHO MEAS - ESV(MOD-SP2): 41.2 ML
BH CV ECHO MEAS - ESV(MOD-SP4): 41.7 ML
BH CV ECHO MEAS - ESV(TEICH): 20.8 ML
BH CV ECHO MEAS - FS: 38.6 %
BH CV ECHO MEAS - IVS/LVPW: 0.9
BH CV ECHO MEAS - IVSD: 0.94 CM
BH CV ECHO MEAS - LA DIMENSION: 3.3 CM
BH CV ECHO MEAS - LA/AO: 1
BH CV ECHO MEAS - LV DIASTOLIC VOL/BSA (35-75): 47.6 ML/M^2
BH CV ECHO MEAS - LV MASS(C)D: 122.9 GRAMS
BH CV ECHO MEAS - LV MASS(C)DI: 56.8 GRAMS/M^2
BH CV ECHO MEAS - LV MAX PG: 4.9 MMHG
BH CV ECHO MEAS - LV MEAN PG: 2 MMHG
BH CV ECHO MEAS - LV SYSTOLIC VOL/BSA (12-30): 19.3 ML/M^2
BH CV ECHO MEAS - LV V1 MAX: 111 CM/SEC
BH CV ECHO MEAS - LV V1 MEAN: 71.2 CM/SEC
BH CV ECHO MEAS - LV V1 VTI: 22 CM
BH CV ECHO MEAS - LVIDD: 4 CM
BH CV ECHO MEAS - LVIDS: 2.4 CM
BH CV ECHO MEAS - LVLD AP2: 8 CM
BH CV ECHO MEAS - LVLD AP4: 8.3 CM
BH CV ECHO MEAS - LVLS AP2: 7.2 CM
BH CV ECHO MEAS - LVLS AP4: 6.8 CM
BH CV ECHO MEAS - LVOT AREA (M): 3.1 CM^2
BH CV ECHO MEAS - LVOT AREA: 3.1 CM^2
BH CV ECHO MEAS - LVOT DIAM: 2 CM
BH CV ECHO MEAS - LVPWD: 1 CM
BH CV ECHO MEAS - MR MAX PG: 4.7 MMHG
BH CV ECHO MEAS - MR MAX VEL: 108 CM/SEC
BH CV ECHO MEAS - MV A MAX VEL: 70.8 CM/SEC
BH CV ECHO MEAS - MV DEC SLOPE: 404 CM/SEC^2
BH CV ECHO MEAS - MV E MAX VEL: 98 CM/SEC
BH CV ECHO MEAS - MV E/A: 1.4
BH CV ECHO MEAS - MV MAX PG: 4.3 MMHG
BH CV ECHO MEAS - MV MEAN PG: 1 MMHG
BH CV ECHO MEAS - MV P1/2T MAX VEL: 104 CM/SEC
BH CV ECHO MEAS - MV P1/2T: 75.4 MSEC
BH CV ECHO MEAS - MV V2 MAX: 104 CM/SEC
BH CV ECHO MEAS - MV V2 MEAN: 37.3 CM/SEC
BH CV ECHO MEAS - MV V2 VTI: 30.6 CM
BH CV ECHO MEAS - MVA P1/2T LCG: 2.1 CM^2
BH CV ECHO MEAS - MVA(P1/2T): 2.9 CM^2
BH CV ECHO MEAS - MVA(VTI): 2.3 CM^2
BH CV ECHO MEAS - PA MAX PG: 3.3 MMHG
BH CV ECHO MEAS - PA MEAN PG: 2 MMHG
BH CV ECHO MEAS - PA V2 MAX: 91 CM/SEC
BH CV ECHO MEAS - PA V2 MEAN: 62.8 CM/SEC
BH CV ECHO MEAS - PA V2 VTI: 20.5 CM
BH CV ECHO MEAS - RAP SYSTOLE: 10 MMHG
BH CV ECHO MEAS - RVOT AREA: 9.1 CM^2
BH CV ECHO MEAS - RVOT DIAM: 3.4 CM
BH CV ECHO MEAS - RVSP: 12.5 MMHG
BH CV ECHO MEAS - SI(AO): 88.2 ML/M^2
BH CV ECHO MEAS - SI(CUBED): 22.1 ML/M^2
BH CV ECHO MEAS - SI(LVOT): 32 ML/M^2
BH CV ECHO MEAS - SI(MOD-SP2): 23.3 ML/M^2
BH CV ECHO MEAS - SI(MOD-SP4): 28.4 ML/M^2
BH CV ECHO MEAS - SI(TEICH): 22 ML/M^2
BH CV ECHO MEAS - SV(AO): 190.7 ML
BH CV ECHO MEAS - SV(CUBED): 47.8 ML
BH CV ECHO MEAS - SV(LVOT): 69.1 ML
BH CV ECHO MEAS - SV(MOD-SP2): 50.4 ML
BH CV ECHO MEAS - SV(MOD-SP4): 61.3 ML
BH CV ECHO MEAS - SV(TEICH): 47.6 ML
BH CV ECHO MEAS - TR MAX VEL: 78.7 CM/SEC
MAXIMAL PREDICTED HEART RATE: 165 BPM
STRESS TARGET HR: 140 BPM

## 2019-12-05 ENCOUNTER — APPOINTMENT (OUTPATIENT)
Dept: SLEEP MEDICINE | Facility: HOSPITAL | Age: 54
End: 2019-12-05

## 2019-12-12 ENCOUNTER — HOSPITAL ENCOUNTER (OUTPATIENT)
Dept: SLEEP MEDICINE | Facility: HOSPITAL | Age: 54
Discharge: HOME | End: 2019-12-12

## 2019-12-12 DIAGNOSIS — K52.9 CHRONIC DIARRHEA OF UNKNOWN ORIGIN: ICD-10-CM

## 2019-12-12 DIAGNOSIS — R94.4 ABNORMAL RENAL FUNCTION TEST: Primary | ICD-10-CM

## 2019-12-16 ENCOUNTER — LAB (OUTPATIENT)
Dept: LAB | Facility: OTHER | Age: 54
End: 2019-12-16

## 2019-12-16 DIAGNOSIS — K52.9 CHRONIC DIARRHEA OF UNKNOWN ORIGIN: ICD-10-CM

## 2019-12-16 LAB
ALBUMIN SERPL-MCNC: 3.9 G/DL (ref 3.5–5)
ALBUMIN/GLOB SERPL: 1.1 G/DL (ref 1.1–1.8)
ALP SERPL-CCNC: 103 U/L (ref 38–126)
ALT SERPL W P-5'-P-CCNC: 13 U/L
ANION GAP SERPL CALCULATED.3IONS-SCNC: 8 MMOL/L (ref 5–15)
AST SERPL-CCNC: 26 U/L (ref 17–59)
BILIRUB SERPL-MCNC: 0.5 MG/DL (ref 0.2–1.3)
BUN BLD-MCNC: 18 MG/DL (ref 7–23)
BUN/CREAT SERPL: 14.9 (ref 7–25)
CALCIUM SPEC-SCNC: 9 MG/DL (ref 8.4–10.2)
CHLORIDE SERPL-SCNC: 109 MMOL/L (ref 101–112)
CO2 SERPL-SCNC: 21 MMOL/L (ref 22–30)
CREAT BLD-MCNC: 1.21 MG/DL (ref 0.7–1.3)
GFR SERPL CREATININE-BSD FRML MDRD: 62 ML/MIN/1.73 (ref 56–130)
GLOBULIN UR ELPH-MCNC: 3.6 GM/DL (ref 2.3–3.5)
GLUCOSE BLD-MCNC: 106 MG/DL (ref 70–99)
HCT VFR BLD AUTO: 40.3 % (ref 37.5–51)
POTASSIUM BLD-SCNC: 4.2 MMOL/L (ref 3.4–5)
PROT SERPL-MCNC: 7.5 G/DL (ref 6.3–8.6)
SODIUM BLD-SCNC: 138 MMOL/L (ref 137–145)

## 2019-12-16 PROCEDURE — 86003 ALLG SPEC IGE CRUDE XTRC EA: CPT | Performed by: NURSE PRACTITIONER

## 2019-12-16 PROCEDURE — 85014 HEMATOCRIT: CPT | Performed by: INTERNAL MEDICINE

## 2019-12-16 PROCEDURE — 83516 IMMUNOASSAY NONANTIBODY: CPT | Performed by: NURSE PRACTITIONER

## 2019-12-16 PROCEDURE — 83002 ASSAY OF GONADOTROPIN (LH): CPT | Performed by: INTERNAL MEDICINE

## 2019-12-16 PROCEDURE — 80053 COMPREHEN METABOLIC PANEL: CPT | Performed by: INTERNAL MEDICINE

## 2019-12-16 PROCEDURE — 84403 ASSAY OF TOTAL TESTOSTERONE: CPT | Performed by: INTERNAL MEDICINE

## 2019-12-16 PROCEDURE — 84443 ASSAY THYROID STIM HORMONE: CPT | Performed by: INTERNAL MEDICINE

## 2019-12-16 PROCEDURE — 86008 ALLG SPEC IGE RECOMB EA: CPT | Performed by: NURSE PRACTITIONER

## 2019-12-16 PROCEDURE — 83001 ASSAY OF GONADOTROPIN (FSH): CPT | Performed by: INTERNAL MEDICINE

## 2019-12-16 PROCEDURE — 82784 ASSAY IGA/IGD/IGG/IGM EACH: CPT | Performed by: NURSE PRACTITIONER

## 2019-12-16 PROCEDURE — 82670 ASSAY OF TOTAL ESTRADIOL: CPT | Performed by: INTERNAL MEDICINE

## 2019-12-16 PROCEDURE — 36415 COLL VENOUS BLD VENIPUNCTURE: CPT | Performed by: INTERNAL MEDICINE

## 2019-12-16 PROCEDURE — 86255 FLUORESCENT ANTIBODY SCREEN: CPT | Performed by: NURSE PRACTITIONER

## 2019-12-17 ENCOUNTER — OFFICE VISIT (OUTPATIENT)
Dept: ENDOCRINOLOGY | Facility: CLINIC | Age: 54
End: 2019-12-17

## 2019-12-17 VITALS
WEIGHT: 253.8 LBS | DIASTOLIC BLOOD PRESSURE: 80 MMHG | SYSTOLIC BLOOD PRESSURE: 132 MMHG | BODY MASS INDEX: 44.97 KG/M2 | HEART RATE: 76 BPM | HEIGHT: 63 IN | OXYGEN SATURATION: 97 %

## 2019-12-17 DIAGNOSIS — Z78.9 MALE-TO-FEMALE TRANSGENDER PERSON: Primary | ICD-10-CM

## 2019-12-17 DIAGNOSIS — E29.1 HYPOGONADISM IN MALE: ICD-10-CM

## 2019-12-17 LAB
ESTRADIOL SERPL HS-MCNC: 185 PG/ML
FSH SERPL-ACNC: 2.34 MIU/ML
LH SERPL-ACNC: 7.61 MIU/ML
TESTOST SERPL-MCNC: 166 NG/DL (ref 193–740)
TSH SERPL DL<=0.05 MIU/L-ACNC: 2.63 UIU/ML (ref 0.27–4.2)

## 2019-12-17 PROCEDURE — 99214 OFFICE O/P EST MOD 30 MIN: CPT | Performed by: INTERNAL MEDICINE

## 2019-12-17 NOTE — PROGRESS NOTES
Rupesh Valencia Jr is a 54 y.o. male who presents for  evaluation of transgender MTF        Primary Care / Referring Provider  Coello, JOHNNY Rivera     followup     reason , transgender Male to Female    duration, since about age 49 years old     timing, constant. She recognized that he suppressed this desire but now she no longer wants to suppress it     quality, presently controlled    previous treatment - she took OCP, herbs  presently using makeup   responding to estradiol  aldactone not given due to ARF but on finasteride         2016 nl PSA     I have psychiatrist letter of support 10-16 and sent for scanning     Past Medical History:   Diagnosis Date   • Acute sinusitis    • Benign essential hypertension    • Carpal tunnel syndrome    • Chest pain    • Chronic sinusitis    • Cough    • Diabetes mellitus without complication (CMS/HCC)     type II or unspecified type, not stated as uncontrolled      • Diarrhea of presumed infectious origin    • Dyspnea    • Dyspnea on exertion     Angina equivalent      • Dysuria    • Essential hypertension    • Gender dysphoria      transgendered, crossdresses      • Hyperkeratosis    • Hypogonadism in male 2/24/2017   • Impaired fasting blood sugar    • Inflamed seborrheic keratosis    • Male hypogonadism    • Male-to-female transgender person 2/24/2017   • Male-to-female transsexuality    • Migraine    • Morbid obesity due to excess calories (CMS/HCC) 2/24/2017   • Obesity    • Pain in joint     unspecified   • Poisoning due to venomous spider    • Renal impairment    • Routine general medical examination at a health care facility    • Special screening for malignant neoplasm of prostate    • Vitamin D deficiency 2/24/2017     Family History   Problem Relation Age of Onset   • Breast cancer Mother    • Diabetes Mother    • Diabetes Father    • Prostate cancer Father    • Skin cancer Father      Social History     Tobacco Use   • Smoking status: Never Smoker   •  "Smokeless tobacco: Never Used   Substance Use Topics   • Alcohol use: Yes     Comment: occasional   • Drug use: No         Current Outpatient Medications:   •  albuterol sulfate  (90 Base) MCG/ACT inhaler, Inhale 2 puffs Every 4 (Four) Hours As Needed for Wheezing or Shortness of Air., Disp: 18 g, Rfl: 11  •  atenolol (TENORMIN) 25 MG tablet, TAKE 2 TABLETS BY MOUTH DAILY., Disp: 60 tablet, Rfl: 5  •  clomiPRAMINE (ANAFRANIL) 50 MG capsule, Take 1 capsule by mouth Every Night., Disp: 50 capsule, Rfl: 3  •  estradiol (ESTRACE) 2 MG tablet, Take 4 tablets by mouth Daily., Disp: 120 tablet, Rfl: 11  •  finasteride (PROSCAR) 5 MG tablet, Take 1 tablet by mouth Daily., Disp: 30 tablet, Rfl: 11  •  FLUoxetine (PROzac) 20 MG capsule, TAKE 1 CAPSULE BY MOUTH DAILY., Disp: 30 capsule, Rfl: 5  •  isosorbide mononitrate (IMDUR) 30 MG 24 hr tablet, Take 1 tablet by mouth Daily., Disp: 90 tablet, Rfl: 1  •  levothyroxine (SYNTHROID, LEVOTHROID) 25 MCG tablet, Take 1 tablet by mouth Daily., Disp: 90 tablet, Rfl: 3  •  losartan (COZAAR) 50 MG tablet, Take 1 tablet by mouth Daily., Disp: 30 tablet, Rfl: 0  •  metFORMIN ER (GLUCOPHAGE-XR) 500 MG 24 hr tablet, Take 1 tablet by mouth Daily With Breakfast., Disp: 90 tablet, Rfl: 1  •  Mometasone Furoate 100 MCG/ACT aerosol, Inhale 2 puffs 2 (Two) Times a Day., Disp: 1 inhaler, Rfl: 11  •  montelukast (SINGULAIR) 10 MG tablet, Take 1 tablet by mouth Every Night., Disp: 30 tablet, Rfl: 11  •  mupirocin (BACTROBAN) 2 % ointment, Apply  topically 2 (Two) Times a Day., Disp: 30 g, Rfl: 5  •  nystatin (MYCOSTATIN) 505794 UNIT/GM powder, APPLY TOPICALLY 2 (TWO) TIMES A DAY., Disp: 45 g, Rfl: 0  •  potassium chloride (K-DUR,KLOR-CON) 20 MEQ CR tablet, TAKE ONE TABLET BY MOUTH EVERY DAY, Disp: 30 tablet, Rfl: 11  •  Syringe/Needle, Disp, (SYRINGE LUER LOCK) 25G X 1\" 3 ML misc, 1 application Every 30 (Thirty) Days., Disp: 50 each, Rfl: 12  •  Topiramate  MG capsule extended-release " 24 hour sprinkle, , Disp: , Rfl: 5    Current Facility-Administered Medications:   •  cyanocobalamin injection 1,000 mcg, 1,000 mcg, Intramuscular, Q28 Days, Samia Gonzalez, APRN, 1,000 mcg at 08/10/18 1611    Review of Systems    Review of Systems   Constitutional: Positive for fatigue. Negative for activity change, appetite change, chills, diaphoresis, fever and unexpected weight change.   HENT: Negative for congestion, drooling, ear discharge, ear pain, facial swelling, mouth sores, nosebleeds, postnasal drip, rhinorrhea, sinus pressure, sneezing, sore throat, tinnitus, trouble swallowing and voice change.    Eyes: Negative for photophobia, pain, discharge, redness, itching and visual disturbance.   Respiratory: Negative for apnea, cough, choking, chest tightness, shortness of breath, wheezing and stridor.    Cardiovascular: Negative for chest pain, palpitations and leg swelling.   Gastrointestinal: Negative for abdominal distention, abdominal pain, anal bleeding, blood in stool, constipation, diarrhea, nausea, rectal pain and vomiting.   Endocrine: Negative for cold intolerance, heat intolerance, polydipsia, polyphagia and polyuria.        Breast enlargement    Genitourinary: Negative for difficulty urinating, dysuria, enuresis, flank pain, frequency, hematuria and urgency.        Decreased penile size    Musculoskeletal: Negative for arthralgias, back pain, gait problem, joint swelling, myalgias, neck pain and neck stiffness.   Skin: Negative for color change, pallor and wound.        Softer skin  Decrease body hair   Less sweating   Allergic/Immunologic: Negative for environmental allergies, food allergies and immunocompromised state.   Neurological: Negative for dizziness, tremors, seizures, syncope, facial asymmetry, speech difficulty, weakness, light-headedness, numbness and headaches.   Hematological: Negative for adenopathy. Does not bruise/bleed easily.   Psychiatric/Behavioral: Negative for agitation,  "behavioral problems, confusion, decreased concentration, dysphoric mood, hallucinations, self-injury, sleep disturbance and suicidal ideas. The patient is not nervous/anxious and is not hyperactive.         Objective:     /80   Pulse 76   Ht 160 cm (63\")   Wt 115 kg (253 lb 12.8 oz)   SpO2 97%   BMI 44.96 kg/m²     Physical Exam   Constitutional: He is oriented to person, place, and time. He appears well-developed and well-nourished. He is cooperative.   HENT:   Head: Normocephalic and atraumatic.   Right Ear: External ear normal.   Left Ear: External ear normal.   Nose: Nose normal.   Mouth/Throat: Oropharynx is clear and moist. No oropharyngeal exudate.   Eyes: Pupils are equal, round, and reactive to light. Conjunctivae and EOM are normal. No scleral icterus. Right eye exhibits normal extraocular motion. Left eye exhibits normal extraocular motion.   Neck: Neck supple. No JVD present. No muscular tenderness present. No tracheal deviation, no edema and no erythema present. No thyromegaly present.   Cardiovascular: Normal rate, regular rhythm, normal heart sounds and intact distal pulses. Exam reveals no gallop and no friction rub.   No murmur heard.  Pulmonary/Chest: Effort normal and breath sounds normal. No stridor. No respiratory distress. He has no decreased breath sounds. He has no wheezes. He has no rhonchi. He has no rales. He exhibits no tenderness.   Abdominal: Soft. Bowel sounds are normal. He exhibits no distension and no mass. There is no hepatomegaly. There is no tenderness. There is no rebound and no guarding. No hernia.   Musculoskeletal: Normal range of motion. He exhibits no edema, tenderness or deformity.     Vascular Status -  His right foot exhibits normal foot vasculature . His left foot exhibits normal foot vasculature .  Lymphadenopathy:     He has no cervical adenopathy.   Neurological: He is alert and oriented to person, place, and time. He has normal reflexes. No cranial nerve " deficit. He exhibits normal muscle tone. Coordination normal.   Skin: Skin is warm. No rash noted. No erythema. No pallor.   Psychiatric: He has a normal mood and affect. His behavior is normal. Judgment and thought content normal.   Nursing note and vitals reviewed.      Lab Review            Assessment/Plan       ICD-10-CM ICD-9-CM   1. Male-to-female transgender person F64.0 302.85   2. Hypogonadism in male E29.1 257.2            Transgender Male to Female    Male Hypogonadism       baseline prolactin ( nl ), estradiol (17)  testosterone ( 378)    Estradiol 2 x 2  mg every day change to 6 mg daily - 8 mg daily     no aldactone due to ARF on HCTZ     finasteride 5 mg daily     Add progesterone 100 mg daily     Now    Component      Latest Ref Rng 5/19/2016 7/19/2016   Testosterone, Total      348 - 1197 ng/dL 378 180 (L)     Component      Latest Ref Rng 5/19/2016 7/19/2016   Estradiol      8.0 - 35.0 pg/mL 17.6 358.0 (H)       Lab Results   Component Value Date    ESTRADIOL 185.0 12/16/2019    ESTRADIOL 142.0 10/11/2019    ESTRADIOL 153.0 07/01/2019     Lab Results   Component Value Date    TESTOSTERONE 166.00 (L) 12/16/2019    TESTOSTERONE 236.00 10/11/2019    TESTOSTERONE 182.00 (L) 07/01/2019     Lab Results   Component Value Date    TESTOSTEROTT 584 02/24/2017    TESTOSTEROTT 408 10/19/2016    TESTOSTEROTT 180 (L) 07/19/2016       Presently not interested in surgery     rtc in 3 months    We spoke about progesterone  We researched in office and no added benefit with introduction of risk of CV disease        Renal Failure     Lab Results   Component Value Date    HGBA1C 5.59 10/11/2019    HGBA1C 5.0 08/11/2017    HGBA1C 5.3 07/19/2016     Lab Results   Component Value Date    GLUCOSE 106 (H) 12/16/2019    CALCIUM 9.0 12/16/2019     12/16/2019    K 4.2 12/16/2019    CO2 21.0 (L) 12/16/2019     12/16/2019    BUN 18 12/16/2019    CREATININE 1.21 12/16/2019    EGFRIFNONA 62 12/16/2019    BCR 14.9  12/16/2019    ANIONGAP 8.0 12/16/2019       Lab Results   Component Value Date    WBC 9.52 10/11/2019    HGB 14.1 10/11/2019    HCT 40.3 12/16/2019    MCV 90.7 10/11/2019     10/11/2019           Lipid Management  Lab Results   Component Value Date    CHOL 183 10/11/2019    CHOL 176 08/11/2017     Lab Results   Component Value Date    TRIG 86 10/11/2019    TRIG 67 08/11/2017    TRIG 89 07/19/2016     Lab Results   Component Value Date    HDL 69 (H) 10/11/2019    HDL 56 08/11/2017    HDL 57 (L) 07/19/2016     No components found for: LDLCALC    Blood Pressure Management:    Vitals:    12/17/19 1013   BP: 132/80   Pulse: 76   SpO2: 97%       On atenolol 50 mg daily   imdur 30 mg er daily , had chest pain , cath nl around 2014 , started him on imdur  On losartan - hctz    Having hypokalemia, we increased K to 1 tabs per day     Preventive Care:      Not a smoker     Weight Related:   Wt Readings from Last 3 Encounters:   12/17/19 115 kg (253 lb 12.8 oz)   11/25/19 118 kg (260 lb)   11/25/19 118 kg (260 lb)     Body mass index is 44.96 kg/m².    Advised to consume 500 calories less per day    Exercise 30 min daily       Lost 15 lbs initially , now 6 more     Bone Health    Lab Results   Component Value Date    CALCIUM 9.0 12/16/2019     Lab Results   Component Value Date    VELK76GG 33.0 02/24/2017    KKGK22HL 52.5 10/19/2016         Reassess vit d    Thyroid Health  Lab Results   Component Value Date    TSH 2.630 12/16/2019         Lab Results   Component Value Date    [id number] 323 02/24/2017           I reviewed and summarized records from Stephy Coello APRN from [id number] and I reviewed / ordered labs.     No orders of the defined types were placed in this encounter.        A copy of my note was sent to Stephy Coello APRN    Please see my above opinion and suggestions.

## 2019-12-18 ENCOUNTER — TELEPHONE (OUTPATIENT)
Dept: FAMILY MEDICINE CLINIC | Facility: CLINIC | Age: 54
End: 2019-12-18

## 2019-12-18 LAB
ENDOMYSIUM IGA SER QL: NEGATIVE
GLIADIN PEPTIDE IGA SER-ACNC: 9 UNITS (ref 0–19)
GLIADIN PEPTIDE IGG SER-ACNC: 6 UNITS (ref 0–19)
IGA SERPL-MCNC: 401 MG/DL (ref 90–386)
TTG IGA SER-ACNC: <2 U/ML (ref 0–3)
TTG IGG SER-ACNC: 3 U/ML (ref 0–5)

## 2019-12-18 RX ORDER — CYANOCOBALAMIN 1000 UG/ML
1000 INJECTION, SOLUTION INTRAMUSCULAR; SUBCUTANEOUS ONCE
Qty: 1 ML | Refills: 6 | OUTPATIENT
Start: 2019-12-18 | End: 2019-12-18

## 2019-12-18 NOTE — TELEPHONE ENCOUNTER
PT STATES DR PINEDA WAS SUPPOSED TO HAVE SENT A RX IN FOR PROGESTERONE TO [facility]. PLEASE SEND IN.          Documentation

## 2019-12-18 NOTE — TELEPHONE ENCOUNTER
PT STATES DR PINEDA WAS SUPPOSED TO HAVE SENT A RX IN FOR PROGESTERONE TO [facility]. PLEASE SEND IN.

## 2019-12-19 DIAGNOSIS — R76.8 ELEVATED ANTI-TISSUE TRANSGLUTAMINASE (TTG) IGA LEVEL: Primary | ICD-10-CM

## 2019-12-20 LAB
ALPHA GAL IGE: 0.38 KU/L
BEEF IGE QN: <0.1 KU/L
CALIF WALNUT POLN IGE QN: 0.11 KU/L
CLAM IGE QN: <0.1 KU/L
CODFISH IGE QN: <0.1 KU/L
CONV CLASS DESCRIPTION: ABNORMAL
CORN IGE QN: 0.14 KU/L
COW MILK IGE QN: <0.1 KU/L
EGG WHITE IGE QN: <0.1 KU/L
LAMB IGE QN: <0.1 KU/L
Lab: 0
PEANUT IGE QN: 0.28 KU/L
PORK IGE: <0.1 KU/L
SCALLOP IGE QN: 0.18 KU/L
SESAME SEED IGE: 0.29 KU/L
SHRIMP IGE: 0.93 KU/L
SOYBEAN IGE QN: 0.14 KU/L
WHEAT IGE QN: 0.21 KU/L

## 2019-12-23 ENCOUNTER — LAB (OUTPATIENT)
Dept: LAB | Facility: OTHER | Age: 54
End: 2019-12-23

## 2019-12-23 DIAGNOSIS — R76.8 ELEVATED ANTI-TISSUE TRANSGLUTAMINASE (TTG) IGA LEVEL: ICD-10-CM

## 2019-12-23 PROCEDURE — 84165 PROTEIN E-PHORESIS SERUM: CPT | Performed by: NURSE PRACTITIONER

## 2019-12-23 PROCEDURE — 84155 ASSAY OF PROTEIN SERUM: CPT | Performed by: NURSE PRACTITIONER

## 2019-12-23 PROCEDURE — 36415 COLL VENOUS BLD VENIPUNCTURE: CPT | Performed by: NURSE PRACTITIONER

## 2019-12-23 PROCEDURE — 84156 ASSAY OF PROTEIN URINE: CPT | Performed by: NURSE PRACTITIONER

## 2019-12-23 PROCEDURE — 80074 ACUTE HEPATITIS PANEL: CPT | Performed by: NURSE PRACTITIONER

## 2019-12-23 PROCEDURE — 86335 IMMUNFIX E-PHORSIS/URINE/CSF: CPT | Performed by: NURSE PRACTITIONER

## 2019-12-23 PROCEDURE — 84166 PROTEIN E-PHORESIS/URINE/CSF: CPT | Performed by: NURSE PRACTITIONER

## 2019-12-24 LAB
HAV IGM SERPL QL IA: NORMAL
HBV CORE IGM SERPL QL IA: NORMAL
HBV SURFACE AG SERPL QL IA: NORMAL
HCV AB SER DONR QL: NORMAL
HOLD SPECIMEN: NORMAL

## 2019-12-26 DIAGNOSIS — I10 ESSENTIAL HYPERTENSION: ICD-10-CM

## 2019-12-26 LAB
ALBUMIN SERPL-MCNC: 3.3 G/DL (ref 2.9–4.4)
ALBUMIN/GLOB SERPL: 0.9 {RATIO} (ref 0.7–1.7)
ALPHA1 GLOB FLD ELPH-MCNC: 0.4 G/DL (ref 0–0.4)
ALPHA2 GLOB SERPL ELPH-MCNC: 0.9 G/DL (ref 0.4–1)
B-GLOBULIN SERPL ELPH-MCNC: 1.3 G/DL (ref 0.7–1.3)
GAMMA GLOB SERPL ELPH-MCNC: 1.2 G/DL (ref 0.4–1.8)
GLOBULIN SER CALC-MCNC: 3.8 G/DL (ref 2.2–3.9)
Lab: NORMAL
M-SPIKE: NORMAL G/DL
PROT SERPL-MCNC: 7.1 G/DL (ref 6–8.5)

## 2019-12-26 RX ORDER — LOSARTAN POTASSIUM 50 MG/1
50 TABLET ORAL DAILY
Qty: 30 TABLET | Refills: 0 | Status: SHIPPED | OUTPATIENT
Start: 2019-12-26 | End: 2020-01-23

## 2019-12-27 DIAGNOSIS — R76.8 ELEVATED ANTI-TISSUE TRANSGLUTAMINASE (TTG) IGA LEVEL: Primary | ICD-10-CM

## 2019-12-27 LAB
ALBUMIN 24H MFR UR ELPH: 24.3 %
ALPHA1 GLOB 24H MFR UR ELPH: 3 %
ALPHA2 GLOB 24H MFR UR ELPH: 16.9 %
B-GLOBULIN MFR UR ELPH: 31.9 %
GAMMA GLOB 24H MFR UR ELPH: 23.9 %
HIV 1 & 2 AB SER-IMP: NORMAL
INTERPRETATION UR IFE-IMP: NORMAL
M PROTEIN 24H MFR UR ELPH: NORMAL %
PROT UR-MCNC: 22.6 MG/DL

## 2020-01-02 NOTE — PROGRESS NOTES
Pulmonary Office Follow-up    Subjective     Rupesh Valencia Jr is seen today at the office for   Chief Complaint   Patient presents with   • Shortness of Breath       Subjective     History of Present Illness  Rupesh Valencia Jr is a 54 y.o. transgender female with a PMH significant for allergies, VIANCA, morbid obesity, hypertension, CKD stage III, and hypothyroidism who presents for follow-up of asthma..    11/25/19: I recommended starting Asmanex 100 HFA and montelukast at night.  I also encouraged him to follow-up with the sleep center for his sleep study and to review the results.    1/3/20: He states that since being on Asmanex that he is not having any issues with shortness of breath.  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  Patient also reports that he was able to walk in from the parking lot today without dyspnea or having to stop.  He rarely needs his albuterol now.  He does have a slight nonproductive cough but he attributes it to some recent drainage from allergies.  Patient is taking montelukast but has not noticed much difference with his allergy symptoms.  He denies wheeze, chest pain, or swelling.  He has been able to lose some weight since cutting out Cokes.  Patient has not rescheduled his sleep study yet as he has had work out of town.      Review of Systems: History obtained from chart review and the patient.  Review of Systems   Constitutional: Positive for fatigue. Negative for fever and unexpected weight change.   HENT: Positive for postnasal drip. Negative for congestion.    Respiratory: Positive for cough. Negative for shortness of breath and wheezing.    Cardiovascular: Negative for chest pain and leg swelling.     As described in the HPI. Otherwise, remainder of ROS (14 systems) were negative.    Patient Active Problem List    Diagnosis   • Male-to-female transgender person   • Hypogonadism in male   • Essential hypertension   • CKD (chronic kidney disease) stage 3, GFR 30-59 ml/min (CMS/[facility])   • Morbid obesity with BMI of 45.0-49.9, adult (CMS/[facility])   • Vitamin D deficiency   • Other fatigue   • Urinary urgency   • Chronic allergic rhinitis   • Tinnitus   • Encounter for screening for malignant neoplasm of colon   • Nasal septal perforation   • Mild intermittent asthma without complication         Current Outpatient Medications:   •  albuterol sulfate  (90 Base) MCG/ACT inhaler, Inhale 2 puffs Every 4 (Four) Hours As Needed for Wheezing or Shortness of Air., Disp: 18 g, Rfl: 11  •  atenolol (TENORMIN) 25 MG tablet, TAKE 2 TABLETS BY MOUTH DAILY., Disp: 60 tablet, Rfl: 5  •  clomiPRAMINE (ANAFRANIL) 50 MG capsule, Take 1 capsule by mouth Every Night., Disp: 50 capsule, Rfl: 3  •  estradiol (ESTRACE) 2 MG tablet, Take 4 tablets by mouth Daily., Disp: 120 tablet, Rfl: 11  •  finasteride (PROSCAR) 5 MG tablet, Take 1 tablet by mouth Daily., Disp: 30 tablet, Rfl: 11  •  FLUoxetine (PROzac) 20 MG capsule, TAKE 1 CAPSULE BY MOUTH DAILY., Disp: 30 capsule, Rfl: 5  •  isosorbide mononitrate (IMDUR) 30 MG 24 hr tablet, Take 1 tablet by mouth Daily., Disp: 90 tablet, Rfl: 1  •  levothyroxine (SYNTHROID, LEVOTHROID) 25 MCG tablet, Take 1 tablet by mouth Daily., Disp: 90 tablet, Rfl: 3  •  losartan (COZAAR) 50 MG tablet, TAKE 1 TABLET BY MOUTH DAILY., Disp: 30 tablet, Rfl: 0  •  metFORMIN ER (GLUCOPHAGE-XR) 500 MG 24 hr tablet, Take 1 tablet by mouth Daily With Breakfast., Disp: 90 tablet, Rfl: 1  •  Mometasone Furoate 100 MCG/ACT aerosol, Inhale 2 puffs 2 (Two) Times a Day., Disp: 1 inhaler, Rfl: 11  •  montelukast (SINGULAIR) 10 MG tablet, Take 1 tablet by mouth Every Night., Disp: 30 tablet, Rfl: 11  •  mupirocin (BACTROBAN) 2 % ointment, Apply  topically 2 (Two) Times a Day., Disp: 30 g, Rfl: 5  •  nystatin (MYCOSTATIN) 043494 UNIT/GM  "powder, APPLY TOPICALLY 2 (TWO) TIMES A DAY., Disp: 45 g, Rfl: 0  •  potassium chloride (K-DUR,KLOR-CON) 20 MEQ CR tablet, TAKE ONE TABLET BY MOUTH EVERY DAY, Disp: 30 tablet, Rfl: 11  •  progesterone (PROMETRIUM) 100 MG capsule, Take 1 capsule by mouth Daily., Disp: 30 capsule, Rfl: 5  •  Syringe/Needle, Disp, (SYRINGE LUER LOCK) 25G X 1\" 3 ML misc, 1 application Every 30 (Thirty) Days., Disp: 50 each, Rfl: 12  •  Topiramate  MG capsule extended-release 24 hour sprinkle, , Disp: , Rfl: 5    Current Facility-Administered Medications:   •  cyanocobalamin injection 1,000 mcg, 1,000 mcg, Intramuscular, Q28 Days, Samia Gonzalez APRN, 1,000 mcg at 08/10/18 1611    Allergies   Allergen Reactions   • Augmentin [Amoxicillin-Pot Clavulanate] Diarrhea     Diarrhea        Past Medical History:   Diagnosis Date   • Acute sinusitis    • Benign essential hypertension    • Carpal tunnel syndrome    • Chest pain    • Chronic sinusitis    • Cough    • Diabetes mellitus without complication (CMS/HCC)     type II or unspecified type, not stated as uncontrolled      • Diarrhea of presumed infectious origin    • Dyspnea    • Dyspnea on exertion     Angina equivalent      • Dysuria    • Essential hypertension    • Gender dysphoria      transgendered, crossdresses      • Hyperkeratosis    • Hypogonadism in male 2/24/2017   • Impaired fasting blood sugar    • Inflamed seborrheic keratosis    • Male hypogonadism    • Male-to-female transgender person 2/24/2017   • Male-to-female transsexuality    • Migraine    • Morbid obesity due to excess calories (CMS/HCC) 2/24/2017   • Obesity    • Pain in joint     unspecified   • Poisoning due to venomous spider    • Renal impairment    • Routine general medical examination at a health care facility    • Special screening for malignant neoplasm of prostate    • Vitamin D deficiency 2/24/2017     Past Surgical History:   Procedure Laterality Date   • CARDIAC CATHETERIZATION  07/09/2015    " "Normal coronaries. Normal LV systolic and diastolic function.   • COLONOSCOPY N/A 11/1/2019    Procedure: COLONOSCOPY;  Surgeon: Srinivas Forte MD;  Location: [facility] ENDOSCOPY;  Service: Gastroenterology   • EYE SURGERY      x3   • INJECTION OF MEDICATION  11/16/2015    Depo Medrol (Methylprednisone) 80mg (1)     • INJECTION OF MEDICATION  03/24/2014    Inj(s) Tend-Sheath, Ligament, Single 52929 (2)    • INJECTION OF MEDICATION  03/24/2014    Kenalog (2)     • INJECTION OF MEDICATION  07/14/2016    Rocephin (2)     Social History     Socioeconomic History   • Marital status:      Spouse name: Not on file   • Number of children: Not on file   • Years of education: Not on file   • Highest education level: Not on file   Tobacco Use   • Smoking status: Never Smoker   • Smokeless tobacco: Never Used   Substance and Sexual Activity   • Alcohol use: Yes     Comment: occasional   • Drug use: No   • Sexual activity: Defer     Family History   Problem Relation Age of Onset   • Breast cancer Mother    • Diabetes Mother    • Diabetes Father    • Prostate cancer Father    • Skin cancer Father           Objective     Blood pressure 148/90, pulse 77, height 160 cm (63\"), weight 116 kg (255 lb), SpO2 98 %.  Physical Exam   Constitutional: He is oriented to person, place, and time. Vital signs are normal. He appears well-developed and well-nourished.   Morbidly obese   HENT:   Head: Normocephalic and atraumatic.   Nose: Mucosal edema present.   Mouth/Throat: Uvula is midline, oropharynx is clear and moist and mucous membranes are normal.   Mallampati 3, septal perforation   Eyes: Pupils are equal, round, and reactive to light. Conjunctivae, EOM and lids are normal.   S/p bilateral corneal transplants   Neck: Trachea normal and normal range of motion. No tracheal tenderness present. No thyroid mass present.   Cardiovascular: Normal rate, regular rhythm and normal heart sounds. PMI is not displaced. Exam reveals no gallop. "   No murmur heard.  Pulmonary/Chest: Effort normal and breath sounds normal. No respiratory distress. He has no decreased breath sounds. He has no wheezes. He has no rhonchi. He exhibits no tenderness.   Abdominal: Soft. Normal appearance and bowel sounds are normal. There is no hepatomegaly. There is no tenderness.   Morbidly obese   Musculoskeletal:   Normal gait, no extremity edema     Vascular Status -  His right foot exhibits no edema. His left foot exhibits no edema.  Lymphadenopathy:        Head (right side): No submandibular adenopathy present.        Head (left side): No submandibular adenopathy present.     He has no cervical adenopathy.        Right: No supraclavicular adenopathy present.        Left: No supraclavicular adenopathy present.   Neurological: He is alert and oriented to person, place, and time.   Skin: Skin is warm and dry. No rash noted. No cyanosis. Nails show no clubbing.   Psychiatric: He has a normal mood and affect. His speech is normal and behavior is normal. Judgment normal.   Nursing note and vitals reviewed.      PFTs: 11/25/19 (independently reviewed and interpreted by me)  Ratio 55  FVC 3.28/ 86%  FEV1 181/ 62%  TLC 4.4/ 78%%  DLCO 11.14/   Moderate obstruction with no significant bronchodilator response.  Borderline reduced TLC suggesting concurrent mild restriction.  Moderately reduced diffusing capacity.  No comparative data available.    Radiology (independently reviewed and interpreted by me): CXR 11/21/19 showed NACPD       Assessment/Plan     Rupesh was seen today for shortness of breath.    Diagnoses and all orders for this visit:    Mild intermittent asthma without complication    Chronic allergic rhinitis    Morbid obesity with BMI of 45.0-49.9, adult (CMS/[facility])         Discussion/ Recommendations:   He has had significant improvement since starting a daily ICS and given his infrequent albuterol use, I do not recommend changing the dose at this time.  He also is  benefiting from montelukast, but he is probably having a little bit of rhinitis from the recent weather changes.  I suggested that he try over-the-counter nasal saline first and then if the symptoms persist he can try nasal steroid.  Otherwise, I have encouraged him to continue efforts with weight loss through dietary changes as well as exercise.    -Continue Asmanex 100 HFA, 2 puffs twice daily.   -Use albuterol only as needed for dyspnea or wheeze.  -Continue montelukast nightly.  -Recommended that he start using frequent nasal saline, but if rhinitis and cough persist, he can try over-the-counter Flonase or Nasacort.  -Again, encouraged him to reschedule his sleep study and follow-up with Dr. Malcolm afterwards for the results.  -Encouraged efforts at weight loss with dietary changes as well as exercise.  -Unable to take flu vaccine due to corneal transplants.    Patient's Body mass index is 45.17 kg/m². BMI is above normal parameters. Recommendations include: exercise counseling.           Return in about 3 months (around 4/3/2020) for Recheck asthma.      Thank you for allowing me to participate in the care of Rupeshjr Valencia Jr. Please do not hesitate to contact me with any questions.         This document has been electronically signed by Isabela Bojorquez MD on January 3, 2020 10:10 AM      Dictated using Dragon

## 2020-01-03 ENCOUNTER — OFFICE VISIT (OUTPATIENT)
Dept: PULMONOLOGY | Facility: CLINIC | Age: 55
End: 2020-01-03

## 2020-01-03 VITALS
WEIGHT: 255 LBS | SYSTOLIC BLOOD PRESSURE: 148 MMHG | OXYGEN SATURATION: 98 % | HEART RATE: 77 BPM | DIASTOLIC BLOOD PRESSURE: 90 MMHG | HEIGHT: 63 IN | BODY MASS INDEX: 45.18 KG/M2

## 2020-01-03 DIAGNOSIS — J45.20 MILD INTERMITTENT ASTHMA WITHOUT COMPLICATION: Primary | ICD-10-CM

## 2020-01-03 DIAGNOSIS — J30.9 CHRONIC ALLERGIC RHINITIS: ICD-10-CM

## 2020-01-03 DIAGNOSIS — E66.01 MORBID OBESITY WITH BMI OF 45.0-49.9, ADULT (HCC): ICD-10-CM

## 2020-01-03 PROCEDURE — 99214 OFFICE O/P EST MOD 30 MIN: CPT | Performed by: INTERNAL MEDICINE

## 2020-01-06 ENCOUNTER — LAB (OUTPATIENT)
Dept: LAB | Facility: OTHER | Age: 55
End: 2020-01-06

## 2020-01-06 DIAGNOSIS — R30.0 DYSURIA: ICD-10-CM

## 2020-01-06 DIAGNOSIS — R30.0 DYSURIA: Primary | ICD-10-CM

## 2020-01-06 DIAGNOSIS — N39.0 URINARY TRACT INFECTION WITHOUT HEMATURIA, SITE UNSPECIFIED: Primary | ICD-10-CM

## 2020-01-06 DIAGNOSIS — R76.8 ELEVATED ANTI-TISSUE TRANSGLUTAMINASE (TTG) IGA LEVEL: ICD-10-CM

## 2020-01-06 LAB
BILIRUB UR QL STRIP: ABNORMAL
CLARITY UR: CLEAR
COLOR UR: ABNORMAL
GLUCOSE UR STRIP-MCNC: NEGATIVE MG/DL
HGB UR QL STRIP.AUTO: NEGATIVE
KETONES UR QL STRIP: NEGATIVE
LEUKOCYTE ESTERASE UR QL STRIP.AUTO: NEGATIVE
NITRITE UR QL STRIP: NEGATIVE
PH UR STRIP.AUTO: 5.5 [PH] (ref 5.5–8)
PROT UR QL STRIP: NEGATIVE
RHEUMATOID FACT SERPL-ACNC: NEGATIVE [IU]/ML
SP GR UR STRIP: 1.02 (ref 1–1.03)
UROBILINOGEN UR QL STRIP: ABNORMAL

## 2020-01-06 PROCEDURE — 86235 NUCLEAR ANTIGEN ANTIBODY: CPT | Performed by: NURSE PRACTITIONER

## 2020-01-06 PROCEDURE — 86200 CCP ANTIBODY: CPT | Performed by: NURSE PRACTITIONER

## 2020-01-06 PROCEDURE — 87086 URINE CULTURE/COLONY COUNT: CPT | Performed by: NURSE PRACTITIONER

## 2020-01-06 PROCEDURE — 81003 URINALYSIS AUTO W/O SCOPE: CPT | Performed by: NURSE PRACTITIONER

## 2020-01-06 PROCEDURE — 86225 DNA ANTIBODY NATIVE: CPT | Performed by: NURSE PRACTITIONER

## 2020-01-06 PROCEDURE — 86430 RHEUMATOID FACTOR TEST QUAL: CPT | Performed by: NURSE PRACTITIONER

## 2020-01-06 PROCEDURE — 36415 COLL VENOUS BLD VENIPUNCTURE: CPT | Performed by: NURSE PRACTITIONER

## 2020-01-08 ENCOUNTER — TELEPHONE (OUTPATIENT)
Dept: FAMILY MEDICINE CLINIC | Facility: CLINIC | Age: 55
End: 2020-01-08

## 2020-01-08 LAB
BACTERIA SPEC AEROBE CULT: NO GROWTH
CENTROMERE B AB SER-ACNC: <0.2 AI (ref 0–0.9)
CHROMATIN AB SERPL-ACNC: <0.2 AI (ref 0–0.9)
DSDNA AB SER-ACNC: <1 IU/ML (ref 0–9)
ENA JO1 AB SER-ACNC: <0.2 AI (ref 0–0.9)
ENA RNP AB SER-ACNC: 0.6 AI (ref 0–0.9)
ENA SCL70 AB SER-ACNC: 0.2 AI (ref 0–0.9)
ENA SM AB SER-ACNC: <0.2 AI (ref 0–0.9)
ENA SS-A AB SER-ACNC: <0.2 AI (ref 0–0.9)
ENA SS-B AB SER-ACNC: <0.2 AI (ref 0–0.9)
Lab: NORMAL

## 2020-01-09 LAB — CCP IGA+IGG SERPL IA-ACNC: 8 UNITS (ref 0–19)

## 2020-01-14 ENCOUNTER — TELEPHONE (OUTPATIENT)
Dept: PULMONOLOGY | Facility: CLINIC | Age: 55
End: 2020-01-14

## 2020-01-14 NOTE — TELEPHONE ENCOUNTER
We received notification that his insurance will not cover his Asmanex inhaler.  Per Dr. Bojorquez a prescription for Q-Segundo 40 2 puffs twow times daily has been sent to his pharmacy.  Patient was notified and agreed to the change.

## 2020-01-17 ENCOUNTER — OFFICE VISIT (OUTPATIENT)
Dept: FAMILY MEDICINE CLINIC | Facility: CLINIC | Age: 55
End: 2020-01-17

## 2020-01-17 ENCOUNTER — LAB (OUTPATIENT)
Dept: LAB | Facility: OTHER | Age: 55
End: 2020-01-17

## 2020-01-17 VITALS
RESPIRATION RATE: 16 BRPM | DIASTOLIC BLOOD PRESSURE: 84 MMHG | TEMPERATURE: 96 F | SYSTOLIC BLOOD PRESSURE: 150 MMHG | WEIGHT: 250.6 LBS | BODY MASS INDEX: 44.4 KG/M2 | HEART RATE: 90 BPM | HEIGHT: 63 IN | OXYGEN SATURATION: 97 %

## 2020-01-17 DIAGNOSIS — E03.9 ACQUIRED HYPOTHYROIDISM: ICD-10-CM

## 2020-01-17 DIAGNOSIS — R30.0 DYSURIA: ICD-10-CM

## 2020-01-17 DIAGNOSIS — R76.8 ELEVATED ANTI-TISSUE TRANSGLUTAMINASE (TTG) IGA LEVEL: Primary | ICD-10-CM

## 2020-01-17 DIAGNOSIS — K52.9 CHRONIC DIARRHEA OF UNKNOWN ORIGIN: ICD-10-CM

## 2020-01-17 DIAGNOSIS — E16.1 HYPERINSULINEMIA: ICD-10-CM

## 2020-01-17 PROCEDURE — 84443 ASSAY THYROID STIM HORMONE: CPT | Performed by: NURSE PRACTITIONER

## 2020-01-17 PROCEDURE — 99214 OFFICE O/P EST MOD 30 MIN: CPT | Performed by: NURSE PRACTITIONER

## 2020-01-17 PROCEDURE — 84480 ASSAY TRIIODOTHYRONINE (T3): CPT | Performed by: NURSE PRACTITIONER

## 2020-01-17 PROCEDURE — 36415 COLL VENOUS BLD VENIPUNCTURE: CPT | Performed by: NURSE PRACTITIONER

## 2020-01-17 PROCEDURE — 84439 ASSAY OF FREE THYROXINE: CPT | Performed by: NURSE PRACTITIONER

## 2020-01-17 RX ORDER — PHENAZOPYRIDINE HYDROCHLORIDE 200 MG/1
200 TABLET, FILM COATED ORAL 3 TIMES DAILY PRN
Qty: 6 TABLET | Refills: 0 | Status: SHIPPED | OUTPATIENT
Start: 2020-01-17 | End: 2020-12-09

## 2020-01-17 NOTE — PATIENT INSTRUCTIONS
Obtain some over the counter antihistamine, due to multiple food allergies, preferrably loratadine generic for Claritin, to blunt the allergic response.    Referral placed to Dr Bacon, gastroenterology.

## 2020-01-17 NOTE — PROGRESS NOTES
Chief Complaint   Patient presents with   • Difficulty Urinating     x 1 week     Subjective   Rupesh Wilbert Erik Truong is a 54 y.o. male who presents to the office for routine follow up of urinary problems and previously elevated renal function tests, chronic diarrhea and ansiety.    The following portions of the patient's history were reviewed and updated as appropriate: allergies, current medications, past family history, past medical history, past social history, past surgical history and problem list.    History of Present Illness     Urinary frequency, hesitancy and dysuria with normal UA/ micro and urine culture results.     Abnormal renal function tests.   Resolved as of 12/16/19 labs after HCTZ stopped.  Has had mild  Elevation of Creatinine for past 2 years, now is just above normal. However, her eGFR has been 53 and 52 for the past 4 months.  No nephrotoxic meds have been added recently, has been on losartan/ HCTZ at same dose for about 2 years.   Denies any OTC NSAID use.   Metformin was only started in October at a low dose, so shouldn't have been responsible for increasing problems over past several months.    Chronic diarrhea necessitating use of OTC antidiarrheals due to  occupation. No clear answers in labs but does test positive for multiple food allergies. Mild alphagal allergy levels with no red med allergies noted but positive for wheat allergy, among several others.      Anxiety well controlled since starting clomipramine.Denies adverse side effects.    HPI, ROS  and PE from most recent  Visit carried forward and updated as appropriate for current situation.    Past Medical History:   Diagnosis Date   • Acute sinusitis    • Benign essential hypertension    • Carpal tunnel syndrome    • Chest pain    • Chronic sinusitis    • Cough    • Diabetes mellitus without complication (CMS/[facility])     type II or unspecified type, not stated as uncontrolled      • Diarrhea of presumed infectious  origin    • Dyspnea    • Dyspnea on exertion     Angina equivalent      • Dysuria    • Essential hypertension    • Gender dysphoria      transgendered, crossdresses      • Hyperkeratosis    • Hypogonadism in male 2/24/2017   • Impaired fasting blood sugar    • Inflamed seborrheic keratosis    • Male hypogonadism    • Male-to-female transgender person 2/24/2017   • Male-to-female transsexuality    • Migraine    • Morbid obesity due to excess calories (CMS/HCC) 2/24/2017   • Obesity    • Pain in joint     unspecified   • Poisoning due to venomous spider    • Renal impairment    • Routine general medical examination at a health care facility    • Special screening for malignant neoplasm of prostate    • Vitamin D deficiency 2/24/2017          Family History   Problem Relation Age of Onset   • Breast cancer Mother    • Diabetes Mother    • Diabetes Father    • Prostate cancer Father    • Skin cancer Father         Review of Systems   Constitutional: Negative.  Negative for chills, fatigue, fever and unexpected weight change.   HENT: Negative for congestion, rhinorrhea, sinus pressure, sneezing, sore throat, tinnitus and trouble swallowing.    Eyes: Negative.  Negative for visual disturbance.   Respiratory: Negative.  Negative for cough, chest tightness, shortness of breath and wheezing.    Cardiovascular: Negative.  Negative for chest pain, palpitations and leg swelling.   Gastrointestinal: Positive for diarrhea (chronic). Negative for abdominal pain, constipation, nausea and vomiting.   Endocrine: Negative.    Genitourinary: Positive for difficulty urinating, dysuria, frequency and urgency.   Musculoskeletal: Negative.  Negative for neck pain.   Skin: Negative.  Negative for color change, pallor, rash and wound.   Allergic/Immunologic: Negative.    Neurological: Negative.  Negative for dizziness, weakness and headaches.   Hematological: Negative.    Psychiatric/Behavioral: Negative for decreased concentration,  "dysphoric mood, sleep disturbance and suicidal ideas. The patient is not nervous/anxious (well controlled with clomipramine).         In the past two weeks the pt has not felt down, depressed, hopeless or lost interest in doing things   All other systems reviewed and are negative.      Objective   Vitals:    01/17/20 0804   BP: 150/84   BP Location: Left arm   Patient Position: Sitting   Cuff Size: Adult   Pulse: 90   Resp: 16   Temp: 96 °F (35.6 °C)   TempSrc: Tympanic   SpO2: 97%   Weight: 114 kg (250 lb 9.6 oz)   Height: 160 cm (63\")   PainSc: 0-No pain     Physical Exam   Constitutional: He is oriented to person, place, and time. He appears well-developed and well-nourished. He is cooperative.  Non-toxic appearance. He does not have a sickly appearance. He appears ill (chronically).   HENT:   Head: Normocephalic and atraumatic.   Right Ear: External ear normal.   Left Ear: External ear normal.   Nose: Mucosal edema and rhinorrhea present.   Mouth/Throat: Uvula is midline, oropharynx is clear and moist and mucous membranes are normal.   Eyes: Pupils are equal, round, and reactive to light. Conjunctivae, EOM and lids are normal. Right eye exhibits no discharge. Left eye exhibits no discharge. No scleral icterus.   Neck: Trachea normal, normal range of motion and phonation normal. Neck supple. No thyromegaly present.   Cardiovascular: Normal rate, regular rhythm, normal heart sounds and intact distal pulses. Exam reveals no gallop and no friction rub.   No murmur heard.  Pulmonary/Chest: Effort normal and breath sounds normal. No respiratory distress. He has no wheezes. He has no rales. He exhibits no tenderness.   Abdominal: Soft. Bowel sounds are normal. He exhibits no distension. There is no tenderness. There is no rebound and no guarding.   Musculoskeletal: Normal range of motion. He exhibits no edema, tenderness or deformity.   Lymphadenopathy:     He has no cervical adenopathy.   Neurological: He is alert and " oriented to person, place, and time. No cranial nerve deficit. GCS eye subscore is 4. GCS verbal subscore is 5. GCS motor subscore is 6.   Skin: Skin is warm, dry and intact. Capillary refill takes 2 to 3 seconds. No rash noted. No erythema. No pallor.   Psychiatric: He has a normal mood and affect. His behavior is normal. Judgment and thought content normal.   Nursing note and vitals reviewed.      Assessment/Plan   Rupesh was seen today for difficulty urinating.    Diagnoses and all orders for this visit:    Elevated anti-tissue transglutaminase (tTG) IgA level  -     Ambulatory Referral to Gastroenterology    Chronic diarrhea of unknown origin  -     Ambulatory Referral to Gastroenterology    Hyperinsulinemia  -     Comprehensive Metabolic Panel; Future  -     Lipid Panel; Future  -     CBC & Differential; Future  -     Hemoglobin A1c; Future    Dysuria  -     phenazopyridine (PYRIDIUM) 200 MG tablet; Take 1 tablet by mouth 3 (Three) Times a Day As Needed for Bladder Spasms.    Multiple food allergies including wheat and peanuts and is on montelukast and is recommended to obtain an OTC antihistamine to take daily.       PHQ-2/PHQ-9 Depression Screening 1/17/2020   Little interest or pleasure in doing things 0   Feeling down, depressed, or hopeless 0   Trouble falling or staying asleep, or sleeping too much 0   Feeling tired or having little energy 0   Poor appetite or overeating 0   Feeling bad about yourself - or that you are a failure or have let yourself or your family down 0   Trouble concentrating on things, such as reading the newspaper or watching television 0   Moving or speaking so slowly that other people could have noticed. Or the opposite - being so fidgety or restless that you have been moving around a lot more than usual 0   Thoughts that you would be better off dead, or of hurting yourself in some way 0   Total Score 0   If you checked off any problems, how difficult have these problems made it  for you to do your work, take care of things at home, or get along with other people? -       Stephy Coello, APRN         Return in about 3 months (around 4/17/2020).    Patient Instructions   Obtain some over the counter antihistamine, due to multiple food allergies, preferrably loratadine generic for Claritin, to blunt the allergic response.    Referral placed to Dr Bacon, gastroenterology.

## 2020-01-18 LAB
T3 SERPL-MCNC: 153 NG/DL (ref 80–200)
T4 FREE SERPL-MCNC: 1.02 NG/DL (ref 0.93–1.7)
TSH SERPL DL<=0.05 MIU/L-ACNC: 1.88 UIU/ML (ref 0.27–4.2)

## 2020-01-22 DIAGNOSIS — I10 ESSENTIAL HYPERTENSION: ICD-10-CM

## 2020-01-23 DIAGNOSIS — I10 ESSENTIAL HYPERTENSION: ICD-10-CM

## 2020-01-23 RX ORDER — LOSARTAN POTASSIUM 50 MG/1
50 TABLET ORAL DAILY
Qty: 30 TABLET | Refills: 0 | Status: SHIPPED | OUTPATIENT
Start: 2020-01-23 | End: 2020-01-23 | Stop reason: SDUPTHER

## 2020-01-23 RX ORDER — LOSARTAN POTASSIUM 50 MG/1
50 TABLET ORAL DAILY
Qty: 30 TABLET | Refills: 5 | Status: SHIPPED | OUTPATIENT
Start: 2020-01-23 | End: 2020-06-19

## 2020-02-18 ENCOUNTER — OFFICE VISIT (OUTPATIENT)
Dept: GASTROENTEROLOGY | Facility: CLINIC | Age: 55
End: 2020-02-18

## 2020-02-18 VITALS
SYSTOLIC BLOOD PRESSURE: 136 MMHG | HEIGHT: 63 IN | HEART RATE: 62 BPM | DIASTOLIC BLOOD PRESSURE: 80 MMHG | BODY MASS INDEX: 44.44 KG/M2 | WEIGHT: 250.8 LBS | OXYGEN SATURATION: 94 %

## 2020-02-18 DIAGNOSIS — R10.84 GENERALIZED ABDOMINAL PAIN: ICD-10-CM

## 2020-02-18 DIAGNOSIS — I10 ESSENTIAL HYPERTENSION: ICD-10-CM

## 2020-02-18 DIAGNOSIS — R19.7 DIARRHEA, UNSPECIFIED TYPE: Primary | ICD-10-CM

## 2020-02-18 DIAGNOSIS — Z91.018 MULTIPLE FOOD ALLERGIES: ICD-10-CM

## 2020-02-18 DIAGNOSIS — I20.9 ANGINAL PAIN (HCC): ICD-10-CM

## 2020-02-18 PROCEDURE — 99213 OFFICE O/P EST LOW 20 MIN: CPT | Performed by: PHYSICIAN ASSISTANT

## 2020-02-18 RX ORDER — HYOSCYAMINE SULFATE EXTENDED-RELEASE 0.38 MG/1
0.38 TABLET ORAL NIGHTLY
Qty: 30 TABLET | Refills: 3 | Status: SHIPPED | OUTPATIENT
Start: 2020-02-18 | End: 2020-07-07

## 2020-02-18 NOTE — PROGRESS NOTES
Chief Complaint   Patient presents with   • Abnormal Lab     Ref. ALEXIS Coello APRN       ENDO PROCEDURE ORDERED:    Subjective    Rupesh Valencia Jr is a 55 y.o. male. he is here today for follow-up.    History of Present Illness    Patient was sent for evaluation for abnormal laboratories by Stephy Coello, who saw the patient on 01/17/2020 with difficulty urinating and diarrhea, TSH, T3, T4 were normal at that time.  I previously saw the patient with fatty liver on 11/14/2019.  He did have a number of questions.  He states he has had loose stools for years, usually better with Elavil.  He tends to have more difficulty in the mornings or with certain foods.  No blood or mucus in his stool.  He has lost 11 pounds since last visit.  Denied nausea, vomiting or dysphagia.  Patient had previous tubular adenoma on 11/01/2019.    Negative chest x-ray 11/21/2019.  Laboratories on 12/16/2019, food allergy panel showed positive for peanut 0.28, soybean 0.14, shrimp 0.93, walnut 0.11, scallop 0.18, wheat 0.21, corn 0.14, sesame seed 0.29, the rest of the panel was negative.  Alpha gal testing showed a positive IgE slightly positive at 0.38.  Celiac panel showed positive for endomysial IgA slightly increased.  TSH was normal, testosterone 166, normal FSH, LH, estradiol.  CMP showed glucose 106, CO2 of 21, otherwise normal.    Protein electrophoresis and hepatitis diagnostic panel were normal on 12/23/2019.    Laboratories 01/06/2019 showed negative RA, JAYDON, urinalysis showed some protein and trace abnormalities.    ASSESSMENT/PLAN:  Patient with multiple mild food allergies.  We had a long discussion about dietary modification and weight loss.  Current LFTs are normal.  Given his diarrhea, I suspect some degree of irritable bowel and I suggested a trial on Levbid 0.375 mg at night to see if this will help with his urgency and discomfort.  We will plan followup at 1 month, further pending clinical course and the results of the  above.       The following portions of the patient's history were reviewed and updated as appropriate:   Past Medical History:   Diagnosis Date   • Acute sinusitis    • Benign essential hypertension    • Carpal tunnel syndrome    • Chest pain    • Chronic sinusitis    • Cough    • Diabetes mellitus without complication (CMS/HCC)     type II or unspecified type, not stated as uncontrolled      • Diarrhea of presumed infectious origin    • Dyspnea    • Dyspnea on exertion     Angina equivalent      • Dysuria    • Essential hypertension    • Gender dysphoria      transgendered, crossdresses      • Hyperkeratosis    • Hypogonadism in male 2/24/2017   • Impaired fasting blood sugar    • Inflamed seborrheic keratosis    • Male hypogonadism    • Male-to-female transgender person 2/24/2017   • Male-to-female transsexuality    • Migraine    • Morbid obesity due to excess calories (CMS/HCC) 2/24/2017   • Obesity    • Pain in joint     unspecified   • Poisoning due to venomous spider    • Renal impairment    • Routine general medical examination at a health care facility    • Special screening for malignant neoplasm of prostate    • Vitamin D deficiency 2/24/2017     Past Surgical History:   Procedure Laterality Date   • CARDIAC CATHETERIZATION  07/09/2015    Normal coronaries. Normal LV systolic and diastolic function.   • COLONOSCOPY N/A 11/1/2019    Procedure: COLONOSCOPY;  Surgeon: Srinivas Forte MD;  Location: [facility] ENDOSCOPY;  Service: Gastroenterology   • EYE SURGERY      x3   • INJECTION OF MEDICATION  11/16/2015    Depo Medrol (Methylprednisone) 80mg (1)     • INJECTION OF MEDICATION  03/24/2014    Inj(s) Tend-Sheath, Ligament, Single 81078 (2)    • INJECTION OF MEDICATION  03/24/2014    Kenalog (2)     • INJECTION OF MEDICATION  07/14/2016    Rocephin (2)     Family History   Problem Relation Age of Onset   • Breast cancer Mother    • Diabetes Mother    • Diabetes Father    • Prostate cancer Father    • Skin  cancer Father        Allergies   Allergen Reactions   • Augmentin [Amoxicillin-Pot Clavulanate] Diarrhea     Diarrhea      Social History     Socioeconomic History   • Marital status:      Spouse name: Not on file   • Number of children: Not on file   • Years of education: Not on file   • Highest education level: Not on file   Tobacco Use   • Smoking status: Never Smoker   • Smokeless tobacco: Never Used   Substance and Sexual Activity   • Alcohol use: Yes     Comment: occasional   • Drug use: No   • Sexual activity: Defer     Current Medications:  Prior to Admission medications    Medication Sig Start Date End Date Taking? Authorizing Provider   albuterol sulfate  (90 Base) MCG/ACT inhaler Inhale 2 puffs Every 4 (Four) Hours As Needed for Wheezing or Shortness of Air. 10/22/19  Yes Stephy Coello APRN   atenolol (TENORMIN) 25 MG tablet TAKE 2 TABLETS BY MOUTH DAILY. 7/1/19  Yes Samia Gonzalez APRN   beclomethasone (QVAR) 40 MCG/ACT inhaler Inhale 2 puffs 2 (Two) Times a Day. 1/14/20  Yes Isabela Bojorquez MD   clomiPRAMINE (ANAFRANIL) 50 MG capsule Take 1 capsule by mouth Every Night. 11/25/19  Yes Stephy Coello APRN   estradiol (ESTRACE) 2 MG tablet Take 4 tablets by mouth Daily. 6/27/19  Yes Fausto Malone MD   finasteride (PROSCAR) 5 MG tablet Take 1 tablet by mouth Daily. 6/27/19  Yes Fausto Malone MD   FLUoxetine (PROzac) 20 MG capsule TAKE 1 CAPSULE BY MOUTH DAILY. 1/31/19  Yes Samia Gonzalez APRN   isosorbide mononitrate (IMDUR) 30 MG 24 hr tablet Take 1 tablet by mouth Daily. 10/22/19  Yes Stephy Coello APRN   levothyroxine (SYNTHROID, LEVOTHROID) 25 MCG tablet Take 1 tablet by mouth Daily. 10/12/19  Yes Stephy Coello APRN   losartan (COZAAR) 50 MG tablet Take 1 tablet by mouth Daily. For high blood pressure 1/23/20  Yes Stephy Coello APRN   metFORMIN ER (GLUCOPHAGE-XR) 500 MG 24 hr tablet Take 1 tablet by mouth Daily With Breakfast.  "10/28/19  Yes Stephy Coello APRN   montelukast (SINGULAIR) 10 MG tablet Take 1 tablet by mouth Every Night. 11/25/19  Yes Isabela Bojorquez MD   mupirocin (BACTROBAN) 2 % ointment Apply  topically 2 (Two) Times a Day. 3/24/17  Yes Nikki Live APRN   nystatin (MYCOSTATIN) 970408 UNIT/GM powder APPLY TOPICALLY 2 (TWO) TIMES A DAY. 4/10/18  Yes Priscilla Viera APRN   phenazopyridine (PYRIDIUM) 200 MG tablet Take 1 tablet by mouth 3 (Three) Times a Day As Needed for Bladder Spasms. 1/17/20  Yes Stephy Coello APRN   potassium chloride (K-DUR,KLOR-CON) 20 MEQ CR tablet TAKE ONE TABLET BY MOUTH EVERY DAY 8/1/19  Yes Fausto Malone MD   progesterone (PROMETRIUM) 100 MG capsule Take 1 capsule by mouth Daily. 12/18/19  Yes Fausto Malone MD   Syringe/Needle, Disp, (SYRINGE LUER LOCK) 25G X 1\" 3 ML misc 1 application Every 30 (Thirty) Days. 9/7/18  Yes Samia Gonzalez APRN   Topiramate  MG capsule extended-release 24 hour sprinkle  11/5/19  Yes Provider, MD Terrell     Review of Systems  Review of Systems       Objective    /80 (BP Location: Left arm, Patient Position: Sitting)   Pulse 62   Ht 160 cm (63\")   Wt 114 kg (250 lb 12.8 oz)   SpO2 94%   BMI 44.43 kg/m²   Physical Exam   Constitutional: He is oriented to person, place, and time. He appears well-developed and well-nourished. No distress.   HENT:   Head: Normocephalic and atraumatic.   Eyes: Pupils are equal, round, and reactive to light. EOM are normal.   Neck: Normal range of motion.   Cardiovascular: Normal rate, regular rhythm and normal heart sounds.   Pulmonary/Chest: Effort normal and breath sounds normal.   Abdominal: Soft. Bowel sounds are normal. He exhibits no shifting dullness, no distension, no abdominal bruit, no ascites and no mass. There is no hepatosplenomegaly. There is tenderness. There is no rigidity, no rebound, no guarding and no CVA tenderness. No hernia. Hernia confirmed " negative in the ventral area.   mild   Musculoskeletal: Normal range of motion.   Neurological: He is alert and oriented to person, place, and time.   Skin: Skin is warm and dry.   Psychiatric: He has a normal mood and affect. His behavior is normal. Judgment and thought content normal.   Nursing note and vitals reviewed.    Assessment/Plan      1. Diarrhea, unspecified type    2. Generalized abdominal pain    3. Multiple food allergies    .   Rupesh was seen today for abnormal lab.    Diagnoses and all orders for this visit:    Diarrhea, unspecified type    Generalized abdominal pain    Multiple food allergies    Other orders  -     hyoscyamine (LEVBID) 0.375 MG 12 hr tablet; Take 1 tablet by mouth Every Night.        Orders placed during this encounter include:  No orders of the defined types were placed in this encounter.      Medications prescribed:  New Medications Ordered This Visit   Medications   • hyoscyamine (LEVBID) 0.375 MG 12 hr tablet     Sig: Take 1 tablet by mouth Every Night.     Dispense:  30 tablet     Refill:  3       Requested Prescriptions     Signed Prescriptions Disp Refills   • hyoscyamine (LEVBID) 0.375 MG 12 hr tablet 30 tablet 3     Sig: Take 1 tablet by mouth Every Night.       Review and/or summary of lab tests, radiology, procedures, medications. Review and summary of old records and obtaining of history. The risks and benefits of my recommendations, as well as other treatment options were discussed with the patient today. Questions were answered.    Follow-up: Return in about 6 weeks (around 3/31/2020), or if symptoms worsen or fail to improve.     * Surgery not found *      This document has been electronically signed by Gonzales Cortes PA-C on February 24, 2020 4:57 PM      Results for orders placed or performed in visit on 01/17/20   T3   Result Value Ref Range    T3, Total 153.0 80.0 - 200.0 ng/dl   TSH   Result Value Ref Range    TSH 1.880 0.270 - 4.200 uIU/mL   T4, Free    Result Value Ref Range    Free T4 1.02 0.93 - 1.70 ng/dL   Results for orders placed or performed in visit on 01/06/20   Urinalysis With Culture If Indicated - Urine, Clean Catch   Result Value Ref Range    Color, UA Dark Yellow (A) Yellow, Straw    Appearance, UA Clear Clear    pH, UA 5.5 5.5 - 8.0    Specific Gravity, UA 1.025 1.005 - 1.030    Glucose, UA Negative Negative    Ketones, UA Negative Negative    Bilirubin, UA Small (1+) (A) Negative    Blood, UA Negative Negative    Protein, UA Negative Negative    Leuk Esterase, UA Negative Negative    Nitrite, UA Negative Negative    Urobilinogen, UA 0.2 E.U./dL 0.2 - 1.0 E.U./dL   Results for orders placed or performed in visit on 01/06/20   JAYDON Comprehensive Panel   Result Value Ref Range    Anti-DNA (DS) Ab Qn <1 0 - 9 IU/mL    RNP Antibodies 0.6 0.0 - 0.9 AI    Mustafa Antibodies <0.2 0.0 - 0.9 AI    Antiscleroderma-70 Antibodies 0.2 0.0 - 0.9 AI    CRISTIANA SSA (RO) Ab <0.2 0.0 - 0.9 AI    CRISTIANA SSB (LA) Ab <0.2 0.0 - 0.9 AI    Antichromatin Antibodies <0.2 0.0 - 0.9 AI    SHERRY-1 IgG <0.2 0.0 - 0.9 AI    Anti-Centromere B Antibodies <0.2 0.0 - 0.9 AI    See below: Comment    Cyclic Citrul Peptide Antibody, IgG / IgA   Result Value Ref Range    CCP Antibodies IgG/IgA 8 0 - 19 units   Urine Culture - Urine, Urine, Clean Catch   Result Value Ref Range    Urine Culture No growth    Rheumatoid Factor   Result Value Ref Range    Rheumatoid Factor Qualitative Negative Negative   Results for orders placed or performed in visit on 12/23/19   Hepatitis Panel, Acute   Result Value Ref Range    Hepatitis B Surface Ag Non-Reactive Non-Reactive    Hep A IgM Non-Reactive Non-Reactive    Hep B C IgM Non-Reactive Non-Reactive    Hepatitis C Ab Non-Reactive Non-Reactive   Hep B Confirmation Tube   Result Value Ref Range    Extra Tube Hold for add-ons.    SHASHANK & PE, Random Urine - Urine, Clean Catch   Result Value Ref Range    Total Protein, Urine 22.6 Not Estab. mg/dL    Albumin, U 24.3 %     Alpha-1-Globulin, U 3.0 %    Alpha-2-Globulin, U 16.9 %    Beta Globulin, U 31.9 %    Gamma Globulin, Urine 23.9 %    M-Khadar, % U Not Observed Not Observed %    Immunofixation Result, Urine Comment     Note: Comment    Protein Electrophoresis, Total   Result Value Ref Range    Total Protein 7.1 6.0 - 8.5 g/dL    Albumin 3.3 2.9 - 4.4 g/dL    Alpha-1-Globulin 0.4 0.0 - 0.4 g/dL    Alpha-2-Globulin 0.9 0.4 - 1.0 g/dL    Beta Globulin 1.3 0.7 - 1.3 g/dL    Gamma Globulin 1.2 0.4 - 1.8 g/dL    M-Khadar Not Observed Not Observed g/dL    Globulin 3.8 2.2 - 3.9 g/dL    A/G Ratio 0.9 0.7 - 1.7    Please note Comment    Results for orders placed or performed in visit on 12/16/19   Food Allergy Profile   Result Value Ref Range    Class Description Comment     Egg White <0.10 Class 0 kU/L    Peanut 0.28 (A) Class 0/I kU/L    Soybean 0.14 (A) Class 0/I kU/L    Milk, Cow's <0.10 Class 0 kU/L    Clams <0.10 Class 0 kU/L    Shrimp 0.93 (A) Class II kU/L    [place] 0.11 (A) Class 0/I kU/L    CodFish <0.10 Class 0 kU/L    Scallop 0.18 (A) Class 0/I kU/L    Wheat 0.21 (A) Class 0/I kU/L    Corn 0.14 (A) Class 0/I kU/L    Sesame Seed 0.29 (A) Class 0/I kU/L   Alpha - Gal Panel   Result Value Ref Range    Beef <0.10 <0.35 kU/L    Class Description 0     Lamb <0.10 <0.35 kU/L    Class Interpretation 0     Pork <0.10 <0.35 kU/L    Class Interpretation 0     Alpha Gal IgE 0.38 (H) <0.10 kU/L   Results for orders placed or performed in visit on 12/12/19   Celiac Comprehensive Panel   Result Value Ref Range    Gliadin Deamidated Peptide Ab, IgA 9 0 - 19 units    Deaminated Gliadin Ab IgG 6 0 - 19 units    Tissue Transglutaminase IgA <2 0 - 3 U/mL    Tissue Transglutaminase IgG 3 0 - 5 U/mL    Endomysial IgA Negative Negative    IgA 401 (H) 90 - 386 mg/dL   Results for orders placed or performed in visit on 11/21/19   Urinalysis, Microscopic Only - Urine, Clean Catch   Result Value Ref Range    RBC, UA 0-2 (A) None Seen /HPF    WBC, UA 0-2  (A) None Seen /HPF    Bacteria, UA Trace (A) None Seen /HPF    Squamous Epithelial Cells, UA 0-2 None Seen, 0-2 /HPF    Hyaline Casts, UA None Seen None Seen /LPF    Methodology Manual Light Microscopy    Urinalysis without microscopic (no culture) - Urine, Clean Catch   Result Value Ref Range    Color, UA Dark Yellow (A) Yellow, Straw    Appearance, UA Clear Clear    pH, UA <=5.0 (L) 5.5 - 8.0    Specific Gravity, UA 1.025 1.005 - 1.030    Glucose, UA Negative Negative    Ketones, UA Negative Negative    Bilirubin, UA Negative Negative    Blood, UA Negative Negative    Protein, UA Negative Negative    Leuk Esterase, UA Negative Negative    Nitrite, UA Negative Negative    Urobilinogen, UA 0.2 E.U./dL 0.2 - 1.0 E.U./dL   Results for orders placed or performed during the hospital encounter of 11/01/19   Tissue Pathology Exam   Result Value Ref Range    Case Report       Surgical Pathology Report                         Case: [id number]                                  Authorizing Provider:  Srinivas Forte MD      Collected:           11/01/2019 02:21 PM          Ordering Location:     [facility]             Received:            11/01/2019 02:41 PM                                 [facility]                                                     Pathologist:           Khoi Bo MD                                                        Specimen:    Large Intestine, Right / Ascending Colon, polyp                                            Final Diagnosis       TUBULAR ADENOMA, ASCENDING COLON.      Gross Description       The specimen consists of a mucosal biopsy measuring up to 0.3 cm in greatest dimension.  All embedded.     Results for orders placed or performed in visit on 10/31/19   Adult Transthoracic Echo Complete W/ Cont if Necessary Per Protocol   Result Value Ref Range    BSA 2.2 m^2    IVSd 0.94 cm    LVIDd 4.0 cm    LVIDs 2.4 cm    LVPWd 1.0 cm    IVS/LVPW 0.9     FS 38.6 %     EDV(Teich) 68.3 ml    ESV(Teich) 20.8 ml    EF(Teich) 69.6 %    EDV(cubed) 62.1 ml    ESV(cubed) 14.3 ml    EF(cubed) 76.9 %    LV mass(C)d 122.9 grams    LV mass(C)dI 56.8 grams/m^2    SV(Teich) 47.6 ml    SI(Teich) 22.0 ml/m^2    SV(cubed) 47.8 ml    SI(cubed) 22.1 ml/m^2    Ao root diam 3.3 cm    Ao root area 8.6 cm^2    LA dimension 3.3 cm    asc Aorta Diam 2.9 cm    LA/Ao 1.0     LVOT diam 2.0 cm    LVOT area 3.1 cm^2    LVOT area(traced) 3.1 cm^2    RVOT diam 3.4 cm    RVOT area 9.1 cm^2    LVLd ap4 8.3 cm    EDV(MOD-sp4) 103.0 ml    LVLs ap4 6.8 cm    ESV(MOD-sp4) 41.7 ml    EF(MOD-sp4) 59.5 %    LVLd ap2 8.0 cm    EDV(MOD-sp2) 91.6 ml    LVLs ap2 7.2 cm    ESV(MOD-sp2) 41.2 ml    EF(MOD-sp2) 55.0 %    SV(MOD-sp4) 61.3 ml    SI(MOD-sp4) 28.4 ml/m^2    SV(MOD-sp2) 50.4 ml    SI(MOD-sp2) 23.3 ml/m^2    Ao root area (BSA corrected) 1.5     LV Juan Vol (BSA corrected) 47.6 ml/m^2    LV Sys Vol (BSA corrected) 19.3 ml/m^2    MV E max gary 98.0 cm/sec    MV A max gary 70.8 cm/sec    MV E/A 1.4     MV V2 max 104.0 cm/sec    MV max PG 4.3 mmHg    MV V2 mean 37.3 cm/sec    MV mean PG 1.0 mmHg    MV V2 VTI 30.6 cm    MVA(VTI) 2.3 cm^2    MV P1/2t max gary 104.0 cm/sec    MV P1/2t 75.4 msec    MVA(P1/2t) 2.9 cm^2    MV dec slope 404.0 cm/sec^2    Ao pk gary 113.0 cm/sec    Ao max PG 5.1 mmHg    Ao max PG (full) 0.18 mmHg    Ao V2 mean 68.9 cm/sec    Ao mean PG 2.0 mmHg    Ao mean PG (full) 0 mmHg    Ao V2 VTI 22.3 cm    BEATRIS(I,A) 3.1 cm^2    BEATRIS(I,D) 3.1 cm^2    BEATRIS(V,A) 3.1 cm^2    BEATRIS(V,D) 3.1 cm^2    LV V1 max PG 4.9 mmHg    LV V1 mean PG 2.0 mmHg    LV V1 max 111.0 cm/sec    LV V1 mean 71.2 cm/sec    LV V1 VTI 22.0 cm    MR max gary 108.0 cm/sec    MR max PG 4.7 mmHg    SV(Ao) 190.7 ml    SI(Ao) 88.2 ml/m^2    SV(LVOT) 69.1 ml    SI(LVOT) 32.0 ml/m^2    PA V2 max 91.0 cm/sec    PA max PG 3.3 mmHg    PA V2 mean 62.8 cm/sec    PA mean PG 2.0 mmHg    PA V2 VTI 20.5 cm    TR max gary 78.7 cm/sec    RVSP(TR) 12.5 mmHg    RAP  systole 10.0 mmHg    MVA P1/2T LCG 2.1 cm^2    BH CV ECHO MARYAM - BZI_BMI 46.1 kilograms/m^2    BH CV ECHO MARYAM - BSA(HAYCOCK) 2.4 m^2    BH CV ECHO MARYAM - BZI_METRIC_WEIGHT 117.9 kg    BH CV ECHO MARYAM - BZI_METRIC_HEIGHT 160.0 cm    Target HR (85%) 140 bpm    Max. Pred. HR (100%) 165 bpm     *Note: Due to a large number of results and/or encounters for the requested time period, some results have not been displayed. A complete set of results can be found in Results Review.       Some portions of this note have been dictated using voice recognition software and may contain errors and/or omissions.

## 2020-02-18 NOTE — PATIENT INSTRUCTIONS

## 2020-02-19 RX ORDER — ATENOLOL 25 MG/1
50 TABLET ORAL DAILY
Qty: 60 TABLET | Refills: 5 | OUTPATIENT
Start: 2020-02-19

## 2020-02-25 DIAGNOSIS — I20.9 ANGINAL PAIN (HCC): ICD-10-CM

## 2020-02-25 RX ORDER — ISOSORBIDE MONONITRATE 30 MG/1
30 TABLET, EXTENDED RELEASE ORAL DAILY
Qty: 90 TABLET | Refills: 1 | Status: SHIPPED | OUTPATIENT
Start: 2020-02-25 | End: 2020-10-23

## 2020-02-25 RX ORDER — ISOSORBIDE MONONITRATE 30 MG/1
30 TABLET, EXTENDED RELEASE ORAL DAILY
Qty: 90 TABLET | Refills: 1 | OUTPATIENT
Start: 2020-02-25

## 2020-03-31 DIAGNOSIS — I10 ESSENTIAL HYPERTENSION: ICD-10-CM

## 2020-03-31 RX ORDER — ATENOLOL 25 MG/1
50 TABLET ORAL DAILY
Qty: 60 TABLET | Refills: 5 | OUTPATIENT
Start: 2020-03-31

## 2020-04-02 DIAGNOSIS — I10 ESSENTIAL HYPERTENSION: ICD-10-CM

## 2020-04-03 DIAGNOSIS — I10 ESSENTIAL HYPERTENSION: ICD-10-CM

## 2020-04-03 RX ORDER — ATENOLOL 25 MG/1
50 TABLET ORAL DAILY
Qty: 60 TABLET | Refills: 5 | OUTPATIENT
Start: 2020-04-03

## 2020-04-06 RX ORDER — ATENOLOL 25 MG/1
50 TABLET ORAL DAILY
Qty: 60 TABLET | Refills: 5 | OUTPATIENT
Start: 2020-04-06

## 2020-04-14 DIAGNOSIS — I10 ESSENTIAL HYPERTENSION: ICD-10-CM

## 2020-04-14 RX ORDER — ATENOLOL 25 MG/1
50 TABLET ORAL DAILY
Qty: 60 TABLET | Refills: 5 | Status: SHIPPED | OUTPATIENT
Start: 2020-04-14 | End: 2020-09-25

## 2020-04-29 DIAGNOSIS — F41.9 ANXIETY: ICD-10-CM

## 2020-04-29 RX ORDER — TOPIRAMATE 100 MG/1
CAPSULE, EXTENDED RELEASE ORAL
Qty: 30 EACH | Refills: 5 | OUTPATIENT
Start: 2020-04-29

## 2020-04-30 RX ORDER — METFORMIN HYDROCHLORIDE 500 MG/1
500 TABLET, EXTENDED RELEASE ORAL
Qty: 90 TABLET | Refills: 1 | Status: SHIPPED | OUTPATIENT
Start: 2020-04-30 | End: 2020-08-18

## 2020-04-30 RX ORDER — CLOMIPRAMINE HYDROCHLORIDE 50 MG/1
CAPSULE ORAL
Qty: 50 CAPSULE | Refills: 3 | Status: SHIPPED | OUTPATIENT
Start: 2020-04-30 | End: 2020-08-20

## 2020-06-01 RX ORDER — TOPIRAMATE 100 MG/1
CAPSULE, EXTENDED RELEASE ORAL
Qty: 30 EACH | Refills: 5 | OUTPATIENT
Start: 2020-06-01

## 2020-06-19 ENCOUNTER — OFFICE VISIT (OUTPATIENT)
Dept: ENDOCRINOLOGY | Facility: CLINIC | Age: 55
End: 2020-06-19

## 2020-06-19 ENCOUNTER — LAB (OUTPATIENT)
Dept: LAB | Facility: HOSPITAL | Age: 55
End: 2020-06-19

## 2020-06-19 VITALS
HEIGHT: 63 IN | SYSTOLIC BLOOD PRESSURE: 150 MMHG | OXYGEN SATURATION: 98 % | WEIGHT: 248 LBS | BODY MASS INDEX: 43.94 KG/M2 | DIASTOLIC BLOOD PRESSURE: 100 MMHG | HEART RATE: 63 BPM

## 2020-06-19 DIAGNOSIS — Z78.9 MALE-TO-FEMALE TRANSGENDER PERSON: Primary | ICD-10-CM

## 2020-06-19 DIAGNOSIS — R73.01 IMPAIRED FASTING GLUCOSE: ICD-10-CM

## 2020-06-19 DIAGNOSIS — N40.0 PROSTATISM: ICD-10-CM

## 2020-06-19 DIAGNOSIS — E29.1 HYPOGONADISM IN MALE: ICD-10-CM

## 2020-06-19 DIAGNOSIS — E55.9 VITAMIN D DEFICIENCY: ICD-10-CM

## 2020-06-19 DIAGNOSIS — E16.1 HYPERINSULINEMIA: ICD-10-CM

## 2020-06-19 DIAGNOSIS — N18.30 CKD (CHRONIC KIDNEY DISEASE) STAGE 3, GFR 30-59 ML/MIN (HCC): ICD-10-CM

## 2020-06-19 DIAGNOSIS — R30.0 DYSURIA: ICD-10-CM

## 2020-06-19 DIAGNOSIS — I10 ESSENTIAL HYPERTENSION: ICD-10-CM

## 2020-06-19 LAB
25(OH)D3 SERPL-MCNC: 35.5 NG/ML (ref 30–100)
ALBUMIN SERPL-MCNC: 4 G/DL (ref 3.5–5.2)
ALBUMIN/GLOB SERPL: 1.1 G/DL
ALP SERPL-CCNC: 102 U/L (ref 39–117)
ALT SERPL W P-5'-P-CCNC: 8 U/L (ref 1–41)
ANION GAP SERPL CALCULATED.3IONS-SCNC: 12.6 MMOL/L (ref 5–15)
AST SERPL-CCNC: 7 U/L (ref 1–40)
BACTERIA UR QL AUTO: ABNORMAL /HPF
BASOPHILS # BLD AUTO: 0.07 10*3/MM3 (ref 0–0.2)
BASOPHILS NFR BLD AUTO: 0.6 % (ref 0–1.5)
BILIRUB SERPL-MCNC: 0.4 MG/DL (ref 0.2–1.2)
BILIRUB UR QL STRIP: NEGATIVE
BUN BLD-MCNC: 15 MG/DL (ref 6–20)
BUN/CREAT SERPL: 13.8 (ref 7–25)
CALCIUM SPEC-SCNC: 9.6 MG/DL (ref 8.6–10.5)
CHLORIDE SERPL-SCNC: 100 MMOL/L (ref 98–107)
CHOLEST SERPL-MCNC: 185 MG/DL (ref 0–200)
CLARITY UR: ABNORMAL
CO2 SERPL-SCNC: 24.4 MMOL/L (ref 22–29)
COLOR UR: YELLOW
CREAT BLD-MCNC: 1.09 MG/DL (ref 0.76–1.27)
CREAT UR-MCNC: 260.5 MG/DL
DEPRECATED RDW RBC AUTO: 40.6 FL (ref 37–54)
EOSINOPHIL # BLD AUTO: 0.2 10*3/MM3 (ref 0–0.4)
EOSINOPHIL NFR BLD AUTO: 1.7 % (ref 0.3–6.2)
ERYTHROCYTE [DISTWIDTH] IN BLOOD BY AUTOMATED COUNT: 12.5 % (ref 12.3–15.4)
ESTRADIOL SERPL HS-MCNC: 148 PG/ML
GFR SERPL CREATININE-BSD FRML MDRD: 70 ML/MIN/1.73
GLOBULIN UR ELPH-MCNC: 3.5 GM/DL
GLUCOSE BLD-MCNC: 105 MG/DL (ref 65–99)
GLUCOSE UR STRIP-MCNC: NEGATIVE MG/DL
HBA1C MFR BLD: 5.27 % (ref 4.8–5.6)
HCT VFR BLD AUTO: 40.6 % (ref 37.5–51)
HDLC SERPL-MCNC: 80 MG/DL (ref 40–60)
HGB BLD-MCNC: 14.2 G/DL (ref 13–17.7)
HGB UR QL STRIP.AUTO: NEGATIVE
HYALINE CASTS UR QL AUTO: ABNORMAL /LPF
IMM GRANULOCYTES # BLD AUTO: 0.06 10*3/MM3 (ref 0–0.05)
IMM GRANULOCYTES NFR BLD AUTO: 0.5 % (ref 0–0.5)
KETONES UR QL STRIP: NEGATIVE
LDLC SERPL CALC-MCNC: 89 MG/DL (ref 0–100)
LDLC/HDLC SERPL: 1.11 {RATIO}
LEUKOCYTE ESTERASE UR QL STRIP.AUTO: NEGATIVE
LYMPHOCYTES # BLD AUTO: 2.18 10*3/MM3 (ref 0.7–3.1)
LYMPHOCYTES NFR BLD AUTO: 19 % (ref 19.6–45.3)
MCH RBC QN AUTO: 31.2 PG (ref 26.6–33)
MCHC RBC AUTO-ENTMCNC: 35 G/DL (ref 31.5–35.7)
MCV RBC AUTO: 89.2 FL (ref 79–97)
MONOCYTES # BLD AUTO: 0.87 10*3/MM3 (ref 0.1–0.9)
MONOCYTES NFR BLD AUTO: 7.6 % (ref 5–12)
NEUTROPHILS # BLD AUTO: 8.09 10*3/MM3 (ref 1.7–7)
NEUTROPHILS NFR BLD AUTO: 70.6 % (ref 42.7–76)
NITRITE UR QL STRIP: NEGATIVE
NRBC BLD AUTO-RTO: 0 /100 WBC (ref 0–0.2)
PH UR STRIP.AUTO: <=5 [PH] (ref 5–8)
PLATELET # BLD AUTO: 321 10*3/MM3 (ref 140–450)
PMV BLD AUTO: 10.5 FL (ref 6–12)
POTASSIUM BLD-SCNC: 4.2 MMOL/L (ref 3.5–5.2)
PROGEST SERPL-MCNC: 0.54 NG/ML
PROT SERPL-MCNC: 7.5 G/DL (ref 6–8.5)
PROT UR QL STRIP: NEGATIVE
PROT UR-MCNC: 13 MG/DL
PROT/CREAT UR: 49.9 MG/G CREA (ref 0–200)
RBC # BLD AUTO: 4.55 10*6/MM3 (ref 4.14–5.8)
RBC # UR: ABNORMAL /HPF
REF LAB TEST METHOD: ABNORMAL
SODIUM BLD-SCNC: 137 MMOL/L (ref 136–145)
SP GR UR STRIP: 1.03 (ref 1–1.03)
SQUAMOUS #/AREA URNS HPF: ABNORMAL /HPF
TESTOST SERPL-MCNC: 180 NG/DL (ref 193–740)
TRIGL SERPL-MCNC: 81 MG/DL (ref 0–150)
TSH SERPL DL<=0.05 MIU/L-ACNC: 2.74 UIU/ML (ref 0.27–4.2)
UROBILINOGEN UR QL STRIP: ABNORMAL
VIT B12 BLD-MCNC: 604 PG/ML (ref 211–946)
VLDLC SERPL-MCNC: 16.2 MG/DL (ref 5–40)
WBC NRBC COR # BLD: 11.47 10*3/MM3 (ref 3.4–10.8)
WBC UR QL AUTO: ABNORMAL /HPF

## 2020-06-19 PROCEDURE — 84156 ASSAY OF PROTEIN URINE: CPT | Performed by: INTERNAL MEDICINE

## 2020-06-19 PROCEDURE — 85025 COMPLETE CBC W/AUTO DIFF WBC: CPT

## 2020-06-19 PROCEDURE — 82306 VITAMIN D 25 HYDROXY: CPT | Performed by: INTERNAL MEDICINE

## 2020-06-19 PROCEDURE — 82670 ASSAY OF TOTAL ESTRADIOL: CPT | Performed by: INTERNAL MEDICINE

## 2020-06-19 PROCEDURE — 82607 VITAMIN B-12: CPT | Performed by: INTERNAL MEDICINE

## 2020-06-19 PROCEDURE — 84443 ASSAY THYROID STIM HORMONE: CPT | Performed by: INTERNAL MEDICINE

## 2020-06-19 PROCEDURE — 81001 URINALYSIS AUTO W/SCOPE: CPT

## 2020-06-19 PROCEDURE — 99214 OFFICE O/P EST MOD 30 MIN: CPT | Performed by: INTERNAL MEDICINE

## 2020-06-19 PROCEDURE — 82570 ASSAY OF URINE CREATININE: CPT | Performed by: INTERNAL MEDICINE

## 2020-06-19 PROCEDURE — 84403 ASSAY OF TOTAL TESTOSTERONE: CPT | Performed by: INTERNAL MEDICINE

## 2020-06-19 PROCEDURE — 84144 ASSAY OF PROGESTERONE: CPT | Performed by: INTERNAL MEDICINE

## 2020-06-19 PROCEDURE — 82642 DIHYDROTESTOSTERONE: CPT | Performed by: INTERNAL MEDICINE

## 2020-06-19 PROCEDURE — 80053 COMPREHEN METABOLIC PANEL: CPT | Performed by: INTERNAL MEDICINE

## 2020-06-19 PROCEDURE — 36415 COLL VENOUS BLD VENIPUNCTURE: CPT | Performed by: INTERNAL MEDICINE

## 2020-06-19 PROCEDURE — 80061 LIPID PANEL: CPT | Performed by: INTERNAL MEDICINE

## 2020-06-19 PROCEDURE — 83036 HEMOGLOBIN GLYCOSYLATED A1C: CPT | Performed by: INTERNAL MEDICINE

## 2020-06-19 RX ORDER — LOSARTAN POTASSIUM AND HYDROCHLOROTHIAZIDE 12.5; 5 MG/1; MG/1
1 TABLET ORAL DAILY
COMMUNITY
End: 2020-10-23 | Stop reason: RX

## 2020-06-19 RX ORDER — ESTRADIOL 2 MG/1
10 TABLET ORAL DAILY
Qty: 150 TABLET | Refills: 11 | Status: SHIPPED | OUTPATIENT
Start: 2020-06-19 | End: 2020-07-29

## 2020-06-19 NOTE — PROGRESS NOTES
Rupesh Valencia Jr is a 55 y.o. male who presents for  evaluation of transgender MTF        Primary Care / Referring Provider  Coello, JOHNNY Rivera     followup     reason , transgender Male to Female    duration, since about age 49 years old     timing, constant. She recognized that he suppressed this desire but now she no longer wants to suppress it     quality, presently controlled    previous treatment - she took OCP, herbs  presently using makeup   responding to estradiol  aldactone not given due to ARF but on finasteride         2016 nl PSA     I have psychiatrist letter of support 10-16 and sent for scanning     Past Medical History:   Diagnosis Date   • Acute sinusitis    • Benign essential hypertension    • Carpal tunnel syndrome    • Chest pain    • Chronic sinusitis    • Cough    • Diabetes mellitus without complication (CMS/HCC)     type II or unspecified type, not stated as uncontrolled      • Diarrhea of presumed infectious origin    • Dyspnea    • Dyspnea on exertion     Angina equivalent      • Dysuria    • Essential hypertension    • Gender dysphoria      transgendered, crossdresses      • Hyperkeratosis    • Hypogonadism in male 2/24/2017   • Impaired fasting blood sugar    • Inflamed seborrheic keratosis    • Male hypogonadism    • Male-to-female transgender person 2/24/2017   • Male-to-female transsexuality    • Migraine    • Morbid obesity due to excess calories (CMS/HCC) 2/24/2017   • Obesity    • Pain in joint     unspecified   • Poisoning due to venomous spider    • Renal impairment    • Routine general medical examination at a health care facility    • Special screening for malignant neoplasm of prostate    • Vitamin D deficiency 2/24/2017     Family History   Problem Relation Age of Onset   • Breast cancer Mother    • Diabetes Mother    • Diabetes Father    • Prostate cancer Father    • Skin cancer Father      Social History     Tobacco Use   • Smoking status: Never Smoker   •  Smokeless tobacco: Never Used   Substance Use Topics   • Alcohol use: Yes     Comment: occasional   • Drug use: No         Current Outpatient Medications:   •  albuterol sulfate  (90 Base) MCG/ACT inhaler, Inhale 2 puffs Every 4 (Four) Hours As Needed for Wheezing or Shortness of Air., Disp: 18 g, Rfl: 11  •  atenolol (TENORMIN) 25 MG tablet, TAKE 2 TABLETS BY MOUTH DAILY., Disp: 60 tablet, Rfl: 5  •  beclomethasone (QVAR) 40 MCG/ACT inhaler, Inhale 2 puffs 2 (Two) Times a Day., Disp: 8.7 g, Rfl: 5  •  benzonatate (TESSALON) 100 MG capsule, Take 1 capsule by mouth 3 (Three) Times a Day As Needed for Cough., Disp: 15 capsule, Rfl: 0  •  clomiPRAMINE (ANAFRANIL) 50 MG capsule, TAKE 1 CAPSULE BY MOUTH EVERY NIGHT. **D/C AMITRIPTYLINE**, Disp: 50 capsule, Rfl: 3  •  estradiol (ESTRACE) 2 MG tablet, Take 4 tablets by mouth Daily., Disp: 120 tablet, Rfl: 11  •  finasteride (PROSCAR) 5 MG tablet, Take 1 tablet by mouth Daily., Disp: 30 tablet, Rfl: 11  •  FLUoxetine (PROzac) 20 MG capsule, TAKE 1 CAPSULE BY MOUTH DAILY., Disp: 30 capsule, Rfl: 5  •  hyoscyamine (LEVBID) 0.375 MG 12 hr tablet, Take 1 tablet by mouth Every Night., Disp: 30 tablet, Rfl: 3  •  isosorbide mononitrate (IMDUR) 30 MG 24 hr tablet, Take 1 tablet by mouth Daily., Disp: 90 tablet, Rfl: 1  •  levothyroxine (SYNTHROID, LEVOTHROID) 25 MCG tablet, Take 1 tablet by mouth Daily., Disp: 90 tablet, Rfl: 3  •  losartan (COZAAR) 50 MG tablet, Take 1 tablet by mouth Daily. For high blood pressure, Disp: 30 tablet, Rfl: 5  •  metFORMIN ER (GLUCOPHAGE-XR) 500 MG 24 hr tablet, TAKE 1 TABLET BY MOUTH DAILY WITH BREAKFAST., Disp: 90 tablet, Rfl: 1  •  methylPREDNISolone (MEDROL, VALORIE,) 4 MG tablet, Take as directed on package instructions., Disp: 21 tablet, Rfl: 0  •  montelukast (SINGULAIR) 10 MG tablet, Take 1 tablet by mouth Every Night., Disp: 30 tablet, Rfl: 11  •  mupirocin (BACTROBAN) 2 % ointment, Apply  topically 2 (Two) Times a Day., Disp: 30 g, Rfl:  "5  •  nystatin (MYCOSTATIN) 641748 UNIT/GM powder, APPLY TOPICALLY 2 (TWO) TIMES A DAY., Disp: 45 g, Rfl: 0  •  phenazopyridine (PYRIDIUM) 200 MG tablet, Take 1 tablet by mouth 3 (Three) Times a Day As Needed for Bladder Spasms., Disp: 6 tablet, Rfl: 0  •  potassium chloride (K-DUR,KLOR-CON) 20 MEQ CR tablet, TAKE ONE TABLET BY MOUTH EVERY DAY, Disp: 30 tablet, Rfl: 11  •  progesterone (PROMETRIUM) 100 MG capsule, Take 1 capsule by mouth Daily., Disp: 30 capsule, Rfl: 5  •  Syringe/Needle, Disp, (SYRINGE LUER LOCK) 25G X 1\" 3 ML misc, 1 application Every 30 (Thirty) Days., Disp: 50 each, Rfl: 12  •  Topiramate  MG capsule extended-release 24 hour sprinkle, , Disp: , Rfl: 5    Current Facility-Administered Medications:   •  cyanocobalamin injection 1,000 mcg, 1,000 mcg, Intramuscular, Q28 Days, Samia Gonzalez, APRN, 1,000 mcg at 08/10/18 1611    Review of Systems    Review of Systems   Constitutional: Positive for fatigue. Negative for activity change, appetite change, chills, diaphoresis, fever and unexpected weight change.   HENT: Negative for congestion, drooling, ear discharge, ear pain, facial swelling, mouth sores, nosebleeds, postnasal drip, rhinorrhea, sinus pressure, sneezing, sore throat, tinnitus, trouble swallowing and voice change.    Eyes: Negative for photophobia, pain, discharge, redness, itching and visual disturbance.   Respiratory: Negative for apnea, cough, choking, chest tightness, shortness of breath, wheezing and stridor.    Cardiovascular: Negative for chest pain, palpitations and leg swelling.   Gastrointestinal: Negative for abdominal distention, abdominal pain, anal bleeding, blood in stool, constipation, diarrhea, nausea, rectal pain and vomiting.   Endocrine: Negative for cold intolerance, heat intolerance, polydipsia, polyphagia and polyuria.        Breast enlargement    Genitourinary: Negative for difficulty urinating, dysuria, enuresis, flank pain, frequency, hematuria and " "urgency.        Decreased penile size    Musculoskeletal: Negative for arthralgias, back pain, gait problem, joint swelling, myalgias, neck pain and neck stiffness.   Skin: Negative for color change, pallor and wound.        Softer skin  Decrease body hair   Less sweating   Allergic/Immunologic: Negative for environmental allergies, food allergies and immunocompromised state.   Neurological: Negative for dizziness, tremors, seizures, syncope, facial asymmetry, speech difficulty, weakness, light-headedness, numbness and headaches.   Hematological: Negative for adenopathy. Does not bruise/bleed easily.   Psychiatric/Behavioral: Negative for agitation, behavioral problems, confusion, decreased concentration, dysphoric mood, hallucinations, self-injury, sleep disturbance and suicidal ideas. The patient is not nervous/anxious and is not hyperactive.         Objective:     /100 (BP Location: Left arm, Patient Position: Sitting, Cuff Size: Large Adult)   Pulse 63   Ht 160 cm (63\")   Wt 112 kg (248 lb)   SpO2 98%   BMI 43.93 kg/m²     Physical Exam   Constitutional: He is oriented to person, place, and time. He appears well-developed and well-nourished. He is cooperative.   HENT:   Head: Normocephalic and atraumatic.   Right Ear: External ear normal.   Left Ear: External ear normal.   Nose: Nose normal.   Mouth/Throat: Oropharynx is clear and moist. No oropharyngeal exudate.   Eyes: Pupils are equal, round, and reactive to light. Conjunctivae and EOM are normal. No scleral icterus. Right eye exhibits normal extraocular motion. Left eye exhibits normal extraocular motion.   Neck: Neck supple. No JVD present. No muscular tenderness present. No tracheal deviation, no edema and no erythema present. No thyromegaly present.   Cardiovascular: Normal rate, regular rhythm, normal heart sounds and intact distal pulses. Exam reveals no gallop and no friction rub.   No murmur heard.  Pulmonary/Chest: Effort normal and breath " sounds normal. No stridor. No respiratory distress. He has no decreased breath sounds. He has no wheezes. He has no rhonchi. He has no rales. He exhibits no tenderness.   Abdominal: Soft. Bowel sounds are normal. He exhibits no distension and no mass. There is no hepatomegaly. There is no tenderness. There is no rebound and no guarding. No hernia.   Musculoskeletal: Normal range of motion. He exhibits no edema, tenderness or deformity.     Vascular Status -  His right foot exhibits normal foot vasculature . His left foot exhibits normal foot vasculature .  Lymphadenopathy:     He has no cervical adenopathy.   Neurological: He is alert and oriented to person, place, and time. He has normal reflexes. No cranial nerve deficit. He exhibits normal muscle tone. Coordination normal.   Skin: Skin is warm. No rash noted. No erythema. No pallor.   Psychiatric: He has a normal mood and affect. His behavior is normal. Judgment and thought content normal.   Nursing note and vitals reviewed.      Lab Review            Assessment/Plan       ICD-10-CM ICD-9-CM   1. Male-to-female transgender person F64.0 302.85   2. Hypogonadism in male E29.1 257.2   3. Vitamin D deficiency E55.9 268.9   4. CKD (chronic kidney disease) stage 3, GFR 30-59 ml/min (CMS/HCC) N18.3 585.3   5. Essential hypertension I10 401.9   6. Prostatism N40.0 600.90            Transgender Male to Female    Male Hypogonadism       baseline prolactin ( nl ), estradiol (17)  testosterone ( 378)    Estradiol 2 x 2  mg every day change to 6 mg daily - 8 mg daily - 10 mg daily     no aldactone due to ARF on HCTZ     finasteride 5 mg daily      progesterone 100 mg daily -      Now    Component      Latest Ref Rng 5/19/2016 7/19/2016   Testosterone, Total      348 - 1197 ng/dL 378 180 (L)     Component      Latest Ref Rng 5/19/2016 7/19/2016   Estradiol      8.0 - 35.0 pg/mL 17.6 358.0 (H)       Lab Results   Component Value Date    ESTRADIOL 185.0 12/16/2019    ESTRADIOL  142.0 10/11/2019    ESTRADIOL 153.0 07/01/2019     Lab Results   Component Value Date    TESTOSTERONE 166.00 (L) 12/16/2019    TESTOSTERONE 236.00 10/11/2019    TESTOSTERONE 182.00 (L) 07/01/2019     Lab Results   Component Value Date    TESTOSTEROTT 584 02/24/2017    TESTOSTEROTT 408 10/19/2016    TESTOSTEROTT 180 (L) 07/19/2016       Presently not interested in surgery     rtc in 3 months           Renal Failure     Lab Results   Component Value Date    HGBA1C 5.59 10/11/2019    HGBA1C 5.0 08/11/2017    HGBA1C 5.3 07/19/2016     Lab Results   Component Value Date    GLUCOSE 106 (H) 12/16/2019    CALCIUM 9.0 12/16/2019     12/16/2019    K 4.2 12/16/2019    CO2 21.0 (L) 12/16/2019     12/16/2019    BUN 18 12/16/2019    CREATININE 1.21 12/16/2019    EGFRIFNONA 62 12/16/2019    BCR 14.9 12/16/2019    ANIONGAP 8.0 12/16/2019       Lab Results   Component Value Date    WBC 9.52 10/11/2019    HGB 14.1 10/11/2019    HCT 40.3 12/16/2019    MCV 90.7 10/11/2019     10/11/2019           Lipid Management  Lab Results   Component Value Date    CHOL 183 10/11/2019    CHOL 176 08/11/2017     Lab Results   Component Value Date    TRIG 86 10/11/2019    TRIG 67 08/11/2017    TRIG 89 07/19/2016     Lab Results   Component Value Date    HDL 69 (H) 10/11/2019    HDL 56 08/11/2017    HDL 57 (L) 07/19/2016     No components found for: LDLCALC    Blood Pressure Management:    Vitals:    06/19/20 0932   BP: 150/100   Pulse: 63   SpO2: 98%       On atenolol 50 mg daily   imdur 30 mg er daily , had chest pain , cath nl around 2014 , started him on imdur  On losartan - hctz    Having hypokalemia, we increased K to 1 tabs per day     Preventive Care:      Not a smoker     Weight Related:   Wt Readings from Last 3 Encounters:   06/19/20 112 kg (248 lb)   03/15/20 109 kg (240 lb)   02/18/20 114 kg (250 lb 12.8 oz)     Body mass index is 43.93 kg/m².    Advised to consume 500 calories less per day    Exercise 30 min daily        Lost 15 lbs initially , now 6 more     Bone Health    Lab Results   Component Value Date    CALCIUM 9.0 12/16/2019     Lab Results   Component Value Date    WPCV17US 33.0 02/24/2017    DSOP85FC 52.5 10/19/2016         Reassess vit d    Thyroid Health  Lab Results   Component Value Date    TSH 1.880 01/17/2020         Lab Results   Component Value Date    FXGHHIDN84 323 02/24/2017           I reviewed and summarized records from Stephy Coello APRN from 2020 and I reviewed / ordered labs.     No orders of the defined types were placed in this encounter.        A copy of my note was sent to Stephy Coello APRN    Please see my above opinion and suggestions.     [email or web address]

## 2020-06-22 DIAGNOSIS — R73.01 IMPAIRED FASTING GLUCOSE: ICD-10-CM

## 2020-06-22 DIAGNOSIS — E29.1 HYPOGONADISM IN MALE: ICD-10-CM

## 2020-06-22 DIAGNOSIS — E55.9 VITAMIN D DEFICIENCY: ICD-10-CM

## 2020-06-22 DIAGNOSIS — Z78.9 MALE-TO-FEMALE TRANSGENDER PERSON: Primary | ICD-10-CM

## 2020-06-23 LAB — ANDROSTANOLONE SERPL-MCNC: 4.7 NG/DL

## 2020-06-29 DIAGNOSIS — D72.829 LEUKOCYTOSIS, UNSPECIFIED TYPE: Primary | ICD-10-CM

## 2020-06-29 RX ORDER — CEPHALEXIN 500 MG/1
500 CAPSULE ORAL 3 TIMES DAILY
Qty: 21 CAPSULE | Refills: 0 | Status: SHIPPED | OUTPATIENT
Start: 2020-06-29 | End: 2020-12-09

## 2020-07-07 RX ORDER — HYOSCYAMINE SULFATE EXTENDED-RELEASE 0.38 MG/1
0.38 TABLET ORAL NIGHTLY
Qty: 30 TABLET | Refills: 1 | Status: SHIPPED | OUTPATIENT
Start: 2020-07-07 | End: 2020-07-29

## 2020-07-13 ENCOUNTER — LAB (OUTPATIENT)
Dept: LAB | Facility: OTHER | Age: 55
End: 2020-07-13

## 2020-07-13 DIAGNOSIS — D72.829 LEUKOCYTOSIS, UNSPECIFIED TYPE: ICD-10-CM

## 2020-07-13 LAB
BASOPHILS # BLD AUTO: 0.03 10*3/MM3 (ref 0–0.2)
BASOPHILS NFR BLD AUTO: 0.2 % (ref 0–1.5)
DEPRECATED RDW RBC AUTO: 42.2 FL (ref 37–54)
EOSINOPHIL # BLD AUTO: 0.42 10*3/MM3 (ref 0–0.4)
EOSINOPHIL NFR BLD AUTO: 3.3 % (ref 0.3–6.2)
ERYTHROCYTE [DISTWIDTH] IN BLOOD BY AUTOMATED COUNT: 13.1 % (ref 12.3–15.4)
HCT VFR BLD AUTO: 38.4 % (ref 37.5–51)
HGB BLD-MCNC: 13.3 G/DL (ref 13–17.7)
LYMPHOCYTES # BLD AUTO: 2.69 10*3/MM3 (ref 0.7–3.1)
LYMPHOCYTES NFR BLD AUTO: 21.1 % (ref 19.6–45.3)
MCH RBC QN AUTO: 31.1 PG (ref 26.6–33)
MCHC RBC AUTO-ENTMCNC: 34.6 G/DL (ref 31.5–35.7)
MCV RBC AUTO: 89.9 FL (ref 79–97)
MONOCYTES # BLD AUTO: 0.97 10*3/MM3 (ref 0.1–0.9)
MONOCYTES NFR BLD AUTO: 7.6 % (ref 5–12)
NEUTROPHILS NFR BLD AUTO: 67.8 % (ref 42.7–76)
NEUTROPHILS NFR BLD AUTO: 8.66 10*3/MM3 (ref 1.7–7)
PLATELET # BLD AUTO: 270 10*3/MM3 (ref 140–450)
PMV BLD AUTO: 9.8 FL (ref 6–12)
RBC # BLD AUTO: 4.27 10*6/MM3 (ref 4.14–5.8)
WBC # BLD AUTO: 12.77 10*3/MM3 (ref 3.4–10.8)

## 2020-07-13 PROCEDURE — 36415 COLL VENOUS BLD VENIPUNCTURE: CPT | Performed by: NURSE PRACTITIONER

## 2020-07-13 PROCEDURE — 85025 COMPLETE CBC W/AUTO DIFF WBC: CPT | Performed by: NURSE PRACTITIONER

## 2020-07-29 RX ORDER — ESTRADIOL 2 MG/1
TABLET ORAL
Qty: 120 TABLET | Refills: 11 | Status: SHIPPED | OUTPATIENT
Start: 2020-07-29 | End: 2021-01-08 | Stop reason: SDUPTHER

## 2020-07-29 RX ORDER — FINASTERIDE 5 MG/1
5 TABLET, FILM COATED ORAL DAILY
Qty: 30 TABLET | Refills: 11 | Status: SHIPPED | OUTPATIENT
Start: 2020-07-29 | End: 2021-01-08 | Stop reason: SDUPTHER

## 2020-07-29 RX ORDER — HYOSCYAMINE SULFATE EXTENDED-RELEASE 0.38 MG/1
0.38 TABLET ORAL NIGHTLY
Qty: 30 TABLET | Refills: 1 | Status: SHIPPED | OUTPATIENT
Start: 2020-07-29 | End: 2020-09-25

## 2020-07-30 ENCOUNTER — OFFICE VISIT (OUTPATIENT)
Dept: FAMILY MEDICINE CLINIC | Facility: CLINIC | Age: 55
End: 2020-07-30

## 2020-07-30 DIAGNOSIS — E87.6 HYPOKALEMIA: ICD-10-CM

## 2020-07-30 DIAGNOSIS — J30.9 CHRONIC ALLERGIC RHINITIS: Primary | ICD-10-CM

## 2020-07-30 DIAGNOSIS — D72.829 LEUKOCYTOSIS, UNSPECIFIED TYPE: ICD-10-CM

## 2020-07-30 DIAGNOSIS — J01.11 ACUTE RECURRENT FRONTAL SINUSITIS: ICD-10-CM

## 2020-07-30 PROCEDURE — 99442 PR PHYS/QHP TELEPHONE EVALUATION 11-20 MIN: CPT | Performed by: NURSE PRACTITIONER

## 2020-07-30 RX ORDER — SULFAMETHOXAZOLE AND TRIMETHOPRIM 800; 160 MG/1; MG/1
1 TABLET ORAL 2 TIMES DAILY
Qty: 20 TABLET | Refills: 0 | Status: SHIPPED | OUTPATIENT
Start: 2020-07-30 | End: 2020-08-09

## 2020-07-30 RX ORDER — FLUTICASONE PROPIONATE 50 MCG
2 SPRAY, SUSPENSION (ML) NASAL DAILY
Qty: 1 BOTTLE | Refills: 5 | Status: SHIPPED | OUTPATIENT
Start: 2020-07-30 | End: 2020-12-09

## 2020-07-30 RX ORDER — CETIRIZINE HYDROCHLORIDE, PSEUDOEPHEDRINE HYDROCHLORIDE 5; 120 MG/1; MG/1
1 TABLET, FILM COATED, EXTENDED RELEASE ORAL 2 TIMES DAILY
Qty: 20 TABLET | Refills: 1 | Status: SHIPPED | OUTPATIENT
Start: 2020-07-30 | End: 2022-01-14

## 2020-07-30 RX ORDER — CEPHALEXIN 500 MG/1
500 CAPSULE ORAL 3 TIMES DAILY
Qty: 21 CAPSULE | Refills: 0 | OUTPATIENT
Start: 2020-07-30

## 2020-07-30 RX ORDER — PREDNISONE 20 MG/1
TABLET ORAL
Qty: 17 TABLET | Refills: 0 | Status: SHIPPED | OUTPATIENT
Start: 2020-07-30 | End: 2020-08-14

## 2020-07-30 NOTE — PROGRESS NOTES
Chief Complaint   Patient presents with   • Sinus Problem     Subjective   Rupesh Wilbert Valencia Jr is a 55 y.o. male who presents to the office by telephone visit due to pandemic for acute sinus problems.    The following portions of the patient's history were reviewed and updated as appropriate: allergies, current medications, past family history, past medical history, past social history, past surgical history and problem list.    History of Present Illness   You have chosen to receive care through a telephone visit. Do you consent to use a telephone visit for your medical care today? Yes  15 minutes medical discussion.     Sinus problems: symptoms:pressure over maxillary and frontal sinuses.  Onset about a week ago, several days after completion of cephalexin for sinus problems 3 weeks ago. Attempted treatment at home OTC allergy medication.  Response some relief of sinus headache yesterday.   Is not on flonase or daily antihistamine but is taking daily montelukast.     Needs follow up of HTN, in office.    No other complaints today.     Parts of most recent relevant visit HPI, ROS  and PE may be carried forward and all are updated as appropriate for current situation.      Past Medical History:   Diagnosis Date   • Acute sinusitis    • Benign essential hypertension    • Carpal tunnel syndrome    • Chest pain    • Chronic sinusitis    • Cough    • Diabetes mellitus without complication (CMS/HCC)     type II or unspecified type, not stated as uncontrolled      • Diarrhea of presumed infectious origin    • Dyspnea    • Dyspnea on exertion     Angina equivalent      • Dysuria    • Essential hypertension    • Gender dysphoria      transgendered, crossdresses      • Hyperkeratosis    • Hypogonadism in male 2/24/2017   • Impaired fasting blood sugar    • Inflamed seborrheic keratosis    • Male hypogonadism    • Male-to-female transgender person 2/24/2017   • Male-to-female transsexuality    • Migraine    • Morbid obesity  due to excess calories (CMS/[facility]) 2/24/2017   • Obesity    • Pain in joint     unspecified   • Poisoning due to venomous spider    • Renal impairment    • Routine general medical examination at a health care facility    • Special screening for malignant neoplasm of prostate    • Vitamin D deficiency 2/24/2017          Family History   Problem Relation Age of Onset   • Breast cancer Mother    • Diabetes Mother    • Diabetes Father    • Prostate cancer Father    • Skin cancer Father         Review of Systems   Constitutional: Negative.  Negative for chills, fatigue, fever and unexpected weight change.   HENT: Positive for congestion, postnasal drip, sinus pressure and sinus pain. Negative for rhinorrhea, sneezing, sore throat, tinnitus and trouble swallowing.    Eyes: Negative.  Negative for visual disturbance.   Respiratory: Negative.  Negative for cough, chest tightness, shortness of breath and wheezing.    Cardiovascular: Negative.  Negative for chest pain, palpitations and leg swelling.   Gastrointestinal: Negative.  Negative for abdominal pain, constipation, diarrhea, nausea and vomiting.   Endocrine: Negative.    Genitourinary: Negative.  Negative for difficulty urinating, dysuria, frequency and urgency.   Musculoskeletal: Negative.  Negative for neck pain.   Skin: Negative.  Negative for color change, pallor, rash and wound.   Allergic/Immunologic: Negative.    Neurological: Positive for headaches. Negative for dizziness and weakness.   Hematological: Negative.    Psychiatric/Behavioral: Negative for decreased concentration, dysphoric mood, sleep disturbance and suicidal ideas. The patient is not nervous/anxious (well controlled with clomipramine).         In the past two weeks the pt has not felt down, depressed, hopeless or lost interest in doing things   All other systems reviewed and are negative.      Objective   There were no vitals filed for this visit.  Physical Exam   Constitutional: He is oriented to  person, place, and time. No distress.   Pulmonary/Chest: Effort normal. No stridor. No respiratory distress.   Neurological: He is alert and oriented to person, place, and time.   Psychiatric: He has a normal mood and affect. His behavior is normal. Judgment and thought content normal.       Assessment/Plan   Rupesh was seen today for sinus problem.    Diagnoses and all orders for this visit:    Chronic allergic rhinitis  -     fluticasone (FLONASE) 50 MCG/ACT nasal spray; 2 sprays into the nostril(s) as directed by provider Daily.  -     cetirizine-pseudoephedrine (ZyrTEC-D) 5-120 MG per 12 hr tablet; Take 1 tablet by mouth 2 (Two) Times a Day.    Acute recurrent frontal sinusitis  -     predniSONE (DELTASONE) 20 MG tablet; Take 3 tablets with first am meal daily on days 1,2, 3, then 2 tablets daily on days 4,5,6 then 1 tablet daily on days 7 and 8.  -     sulfamethoxazole-trimethoprim (BACTRIM DS,SEPTRA DS) 800-160 MG per tablet; Take 1 tablet by mouth 2 (Two) Times a Day for 10 days.  -     cetirizine-pseudoephedrine (ZyrTEC-D) 5-120 MG per 12 hr tablet; Take 1 tablet by mouth 2 (Two) Times a Day.  -     CBC & Differential; Future    Leukocytosis, unspecified type  -     CBC & Differential; Future    Hypokalemia  -     Basic Metabolic Panel; Future           PHQ-2/PHQ-9 Depression Screening 1/17/2020   Little interest or pleasure in doing things 0   Feeling down, depressed, or hopeless 0   Trouble falling or staying asleep, or sleeping too much 0   Feeling tired or having little energy 0   Poor appetite or overeating 0   Feeling bad about yourself - or that you are a failure or have let yourself or your family down 0   Trouble concentrating on things, such as reading the newspaper or watching television 0   Moving or speaking so slowly that other people could have noticed. Or the opposite - being so fidgety or restless that you have been moving around a lot more than usual 0   Thoughts that you would be better  off dead, or of hurting yourself in some way 0   Total Score 0   If you checked off any problems, how difficult have these problems made it for you to do your work, take care of things at home, or get along with other people? -       Stephy Coello, APRN         Return in about 2 weeks (around 8/13/2020), or if symptoms worsen or fail to improve.    There are no Patient Instructions on file for this visit.

## 2020-07-31 DIAGNOSIS — F41.9 ANXIETY: ICD-10-CM

## 2020-08-03 RX ORDER — HYOSCYAMINE SULFATE EXTENDED-RELEASE 0.38 MG/1
0.38 TABLET ORAL NIGHTLY
Qty: 30 TABLET | Refills: 1 | OUTPATIENT
Start: 2020-08-03

## 2020-08-05 RX ORDER — LEVOTHYROXINE SODIUM 0.03 MG/1
25 TABLET ORAL DAILY
Qty: 90 TABLET | Refills: 3 | Status: SHIPPED | OUTPATIENT
Start: 2020-08-05 | End: 2021-05-20

## 2020-08-10 RX ORDER — OLMESARTAN MEDOXOMIL 20 MG/1
TABLET ORAL
Qty: 30 TABLET | Refills: 0 | Status: SHIPPED | OUTPATIENT
Start: 2020-08-10 | End: 2020-09-28

## 2020-08-14 ENCOUNTER — TELEMEDICINE (OUTPATIENT)
Dept: FAMILY MEDICINE CLINIC | Facility: CLINIC | Age: 55
End: 2020-08-14

## 2020-08-14 DIAGNOSIS — J01.11 ACUTE RECURRENT FRONTAL SINUSITIS: ICD-10-CM

## 2020-08-14 DIAGNOSIS — J30.9 CHRONIC ALLERGIC RHINITIS: Chronic | ICD-10-CM

## 2020-08-14 PROCEDURE — 99213 OFFICE O/P EST LOW 20 MIN: CPT | Performed by: NURSE PRACTITIONER

## 2020-08-14 NOTE — PROGRESS NOTES
Chief Complaint   Patient presents with   • Sinus Problem     Subjective   Rupesh Wilbert Valencia Jr is a 55 y.o. male who presents to the office by video visit due to pandemic for follow up of recent sinus infection.    The following portions of the patient's history were reviewed and updated as appropriate: allergies, current medications, past family history, past medical history, past social history, past surgical history and problem list.    History of Present Illness   You have chosen to receive care through a telehealth visit.  Do you consent to use a video/audio connection for your medical care today? Yes  This was an audio and video enabled telemedicine encounter.      Reports today is still having some postnasal drainage but there is no color to it and is not excessive in volume. There is no nasal congestion. There is mild maxillary pressure, no pain. There is no sore throat and no cough. No fever and no chills. Reports is compliant with flonase nasal spray, monelukast and cetirizine D 12 hour prn but is not currently on a daily antihistamine. Advised to begin daily antihistamine cetirizine 10mg daily or loratadine 10mg daily or allegra 180mg daily for allergy control, to obtain choice of these OTC.  To call if symptoms worsen for a steroid pack, and if infection symptoms recur.  Denies other complaints or problems at present.   Parts of most recent relevant visit HPI, ROS  and PE may be carried forward and all are updated as appropriate for current situation.    Past Medical History:   Diagnosis Date   • Acute sinusitis    • Benign essential hypertension    • Carpal tunnel syndrome    • Chest pain    • Chronic sinusitis    • Cough    • Diabetes mellitus without complication (CMS/HCC)     type II or unspecified type, not stated as uncontrolled      • Diarrhea of presumed infectious origin    • Dyspnea    • Dyspnea on exertion     Angina equivalent      • Dysuria    • Essential hypertension    • Gender dysphoria       transgendered, crossdresses      • Hyperkeratosis    • Hypogonadism in male 2/24/2017   • Impaired fasting blood sugar    • Inflamed seborrheic keratosis    • Male hypogonadism    • Male-to-female transgender person 2/24/2017   • Male-to-female transsexuality    • Migraine    • Morbid obesity due to excess calories (CMS/HCC) 2/24/2017   • Obesity    • Pain in joint     unspecified   • Poisoning due to venomous spider    • Renal impairment    • Routine general medical examination at a health care facility    • Special screening for malignant neoplasm of prostate    • Vitamin D deficiency 2/24/2017          Family History   Problem Relation Age of Onset   • Breast cancer Mother    • Diabetes Mother    • Diabetes Father    • Prostate cancer Father    • Skin cancer Father         Review of Systems   Constitutional: Negative.  Negative for chills, fatigue, fever and unexpected weight change.   HENT: Positive for postnasal drip and sinus pressure. Negative for congestion, rhinorrhea, sinus pain, sneezing, sore throat, tinnitus and trouble swallowing.    Eyes: Negative.  Negative for visual disturbance.   Respiratory: Negative.  Negative for cough, chest tightness, shortness of breath and wheezing.    Cardiovascular: Negative.  Negative for chest pain, palpitations and leg swelling.   Gastrointestinal: Negative.  Negative for abdominal pain, constipation, diarrhea, nausea and vomiting.   Endocrine: Negative.    Genitourinary: Negative.  Negative for difficulty urinating, dysuria, frequency and urgency.   Musculoskeletal: Negative.  Negative for neck pain.   Skin: Negative.  Negative for color change, pallor, rash and wound.   Allergic/Immunologic: Negative.    Neurological: Positive for headaches. Negative for dizziness and weakness.   Hematological: Negative.    Psychiatric/Behavioral: Negative for decreased concentration, dysphoric mood, sleep disturbance and suicidal ideas. The patient is not nervous/anxious (well  controlled with clomipramine).         In the past two weeks the pt has not felt down, depressed, hopeless or lost interest in doing things   All other systems reviewed and are negative.      Objective   There were no vitals filed for this visit.  Physical Exam   Constitutional: He is oriented to person, place, and time. He appears well-developed and well-nourished. No distress.   HENT:   Head: Normocephalic and atraumatic.   Eyes: Pupils are equal, round, and reactive to light. Conjunctivae and EOM are normal. Right eye exhibits no discharge. Left eye exhibits no discharge. No scleral icterus.   Neck: Normal range of motion. Neck supple. No tracheal deviation present.   Pulmonary/Chest: Effort normal. No respiratory distress. He has no wheezes.   Musculoskeletal: Normal range of motion. He exhibits no edema, tenderness or deformity.   Neurological: He is alert and oriented to person, place, and time. No cranial nerve deficit.   Skin: Skin is dry. No rash noted. He is not diaphoretic. No erythema. No pallor.   Psychiatric: He has a normal mood and affect. His behavior is normal. Thought content normal.       Assessment/Plan   Rupesh was seen today for sinus problem.    Diagnoses and all orders for this visit:    Chronic allergic rhinitis  Comments:  with frequent exacerbations, improving at present and is to add daily antihistamine today 8/14/20.     Acute recurrent frontal sinusitis  Comments:  resolved 8/14/20           PHQ-2/PHQ-9 Depression Screening 1/17/2020   Little interest or pleasure in doing things 0   Feeling down, depressed, or hopeless 0   Trouble falling or staying asleep, or sleeping too much 0   Feeling tired or having little energy 0   Poor appetite or overeating 0   Feeling bad about yourself - or that you are a failure or have let yourself or your family down 0   Trouble concentrating on things, such as reading the newspaper or watching television 0   Moving or speaking so slowly that other  people could have noticed. Or the opposite - being so fidgety or restless that you have been moving around a lot more than usual 0   Thoughts that you would be better off dead, or of hurting yourself in some way 0   Total Score 0   If you checked off any problems, how difficult have these problems made it for you to do your work, take care of things at home, or get along with other people? -       Stephy Coello, APRN         Return if symptoms worsen or fail to improve.    There are no Patient Instructions on file for this visit.

## 2020-08-18 RX ORDER — METFORMIN HYDROCHLORIDE 500 MG/1
500 TABLET, EXTENDED RELEASE ORAL
Qty: 90 TABLET | Refills: 1 | Status: SHIPPED | OUTPATIENT
Start: 2020-08-18 | End: 2021-02-23

## 2020-08-20 RX ORDER — CLOMIPRAMINE HYDROCHLORIDE 50 MG/1
CAPSULE ORAL
Qty: 50 CAPSULE | Refills: 3 | Status: SHIPPED | OUTPATIENT
Start: 2020-08-20 | End: 2021-07-06 | Stop reason: ALTCHOICE

## 2020-08-21 ENCOUNTER — OFFICE VISIT (OUTPATIENT)
Dept: FAMILY MEDICINE CLINIC | Facility: CLINIC | Age: 55
End: 2020-08-21

## 2020-08-21 DIAGNOSIS — R39.9 LOWER URINARY TRACT SYMPTOMS (LUTS): Primary | ICD-10-CM

## 2020-08-21 DIAGNOSIS — N50.82 SCROTAL PAIN: ICD-10-CM

## 2020-08-21 PROCEDURE — 99442 PR PHYS/QHP TELEPHONE EVALUATION 11-20 MIN: CPT | Performed by: NURSE PRACTITIONER

## 2020-08-21 RX ORDER — TAMSULOSIN HYDROCHLORIDE 0.4 MG/1
1 CAPSULE ORAL DAILY
Qty: 90 CAPSULE | Refills: 1 | Status: SHIPPED | OUTPATIENT
Start: 2020-08-21 | End: 2021-07-08 | Stop reason: SDUPTHER

## 2020-08-21 NOTE — PROGRESS NOTES
No chief complaint on file.    Subjective   Rupesh Wilbert Valencia Jr is a 55 y.o. male who presents to the office by telephone visit due to pandemic for acute, recurrent/ intermittent scrotal pain and urinary symptoms.    The following portions of the patient's history were reviewed and updated as appropriate: allergies, current medications, past family history, past medical history, past social history, past surgical history and problem list.    History of Present Illness   You have chosen to receive care through a telephone visit. Do you consent to use a telephone visit for your medical care today? Yes  15 minutes medical discussion.     Urinary frequency, hesitancy, requires straining to get urine to empty, but feels not emptied when done urinating. Denies dysuria, no pain of prostate when sitting, no hematuria, no painful ejaculation. Reports hasnt had good urinary flow and has had to strain to urinate since kidney stone passed and he stopped taking the Flomax prescribed to improve urinary flow during that time.    Pain in testicles/ scrotum, describes this pain as a dull ache, worse with walking at times, located above and behind the pendulum of the scrotum near the base of the penis.  There is no mass, no redness, and has been  No swelling and no history of trauma.     No fevers, chills, nausea or vomiting.  Parts of most recent relevant visit HPI, ROS  and PE may be carried forward and all are updated as appropriate for current situation.    Past Medical History:   Diagnosis Date   • Acute sinusitis    • Benign essential hypertension    • Carpal tunnel syndrome    • Chest pain    • Chronic sinusitis    • Cough    • Diabetes mellitus without complication (CMS/HCC)     type II or unspecified type, not stated as uncontrolled      • Diarrhea of presumed infectious origin    • Dyspnea    • Dyspnea on exertion     Angina equivalent      • Dysuria    • Essential hypertension    • Gender dysphoria      transgendered,  crossdresses      • Hyperkeratosis    • Hypogonadism in male 2/24/2017   • Impaired fasting blood sugar    • Inflamed seborrheic keratosis    • Male hypogonadism    • Male-to-female transgender person 2/24/2017   • Male-to-female transsexuality    • Migraine    • Morbid obesity due to excess calories (CMS/HCC) 2/24/2017   • Obesity    • Pain in joint     unspecified   • Poisoning due to venomous spider    • Renal impairment    • Routine general medical examination at a health care facility    • Special screening for malignant neoplasm of prostate    • Vitamin D deficiency 2/24/2017          Family History   Problem Relation Age of Onset   • Breast cancer Mother    • Diabetes Mother    • Diabetes Father    • Prostate cancer Father    • Skin cancer Father         Review of Systems   Constitutional: Negative.  Negative for chills, fatigue, fever and unexpected weight change.   HENT: Positive for postnasal drip and sinus pressure. Negative for congestion, rhinorrhea, sinus pain, sneezing, sore throat, tinnitus and trouble swallowing.    Eyes: Negative.  Negative for visual disturbance.   Respiratory: Negative.  Negative for cough, chest tightness, shortness of breath and wheezing.    Cardiovascular: Negative.  Negative for chest pain, palpitations and leg swelling.   Gastrointestinal: Negative.  Negative for abdominal pain, constipation, diarrhea, nausea and vomiting.   Endocrine: Negative.    Genitourinary: Positive for decreased urine volume, difficulty urinating, frequency, testicular pain and urgency. Negative for discharge, dysuria, enuresis, flank pain, genital sores, hematuria, penile pain, penile swelling and scrotal swelling.   Musculoskeletal: Negative.  Negative for neck pain.   Skin: Negative.  Negative for color change, pallor, rash and wound.   Allergic/Immunologic: Negative.    Neurological: Positive for headaches. Negative for dizziness and weakness.   Hematological: Negative.    Psychiatric/Behavioral:  Negative for decreased concentration, dysphoric mood, sleep disturbance and suicidal ideas. The patient is not nervous/anxious (well controlled with clomipramine).         In the past two weeks the pt has not felt down, depressed, hopeless or lost interest in doing things   All other systems reviewed and are negative.      Objective   There were no vitals filed for this visit.  Physical Exam   Constitutional: He is oriented to person, place, and time. No distress.   Pulmonary/Chest: Effort normal. No stridor. No respiratory distress.   Neurological: He is alert and oriented to person, place, and time.   Psychiatric: He has a normal mood and affect. His behavior is normal. Judgment and thought content normal.       Assessment/Plan   Diagnoses and all orders for this visit:    Lower urinary tract symptoms (LUTS)  -     tamsulosin (FLOMAX) 0.4 MG capsule 24 hr capsule; Take 1 capsule by mouth Daily.    Scrotal pain  -     US Scrotum & Testicles; Future         Scrotal pain - acute onset first noted a few weeks ago. US scrotum /testes ordered.  Advised of symptoms of testicular tortion and when to seek emergency advice.  Reports recurrents of  LUTS --now persistent. flomax prescribed for long term use.   PHQ-2/PHQ-9 Depression Screening 1/17/2020   Little interest or pleasure in doing things 0   Feeling down, depressed, or hopeless 0   Trouble falling or staying asleep, or sleeping too much 0   Feeling tired or having little energy 0   Poor appetite or overeating 0   Feeling bad about yourself - or that you are a failure or have let yourself or your family down 0   Trouble concentrating on things, such as reading the newspaper or watching television 0   Moving or speaking so slowly that other people could have noticed. Or the opposite - being so fidgety or restless that you have been moving around a lot more than usual 0   Thoughts that you would be better off dead, or of hurting yourself in some way 0   Total Score 0    If you checked off any problems, how difficult have these problems made it for you to do your work, take care of things at home, or get along with other people? -       Stephy PARK Mode, APRN         Return in about 1 week (around 8/28/2020).    There are no Patient Instructions on file for this visit.    Lab on 07/13/2020   Component Date Value Ref Range Status   • WBC 07/13/2020 12.77* 3.40 - 10.80 10*3/mm3 Final   • RBC 07/13/2020 4.27  4.14 - 5.80 10*6/mm3 Final   • Hemoglobin 07/13/2020 13.3  13.0 - 17.7 g/dL Final   • Hematocrit 07/13/2020 38.4  37.5 - 51.0 % Final   • MCV 07/13/2020 89.9  79.0 - 97.0 fL Final   • MCH 07/13/2020 31.1  26.6 - 33.0 pg Final   • MCHC 07/13/2020 34.6  31.5 - 35.7 g/dL Final   • RDW 07/13/2020 13.1  12.3 - 15.4 % Final   • RDW-SD 07/13/2020 42.2  37.0 - 54.0 fl Final   • MPV 07/13/2020 9.8  6.0 - 12.0 fL Final   • Platelets 07/13/2020 270  140 - 450 10*3/mm3 Final   • Neutrophil % 07/13/2020 67.8  42.7 - 76.0 % Final   • Lymphocyte % 07/13/2020 21.1  19.6 - 45.3 % Final   • Monocyte % 07/13/2020 7.6  5.0 - 12.0 % Final   • Eosinophil % 07/13/2020 3.3  0.3 - 6.2 % Final   • Basophil % 07/13/2020 0.2  0.0 - 1.5 % Final   • Neutrophils, Absolute 07/13/2020 8.66* 1.70 - 7.00 10*3/mm3 Final   • Lymphocytes, Absolute 07/13/2020 2.69  0.70 - 3.10 10*3/mm3 Final   • Monocytes, Absolute 07/13/2020 0.97* 0.10 - 0.90 10*3/mm3 Final   • Eosinophils, Absolute 07/13/2020 0.42* 0.00 - 0.40 10*3/mm3 Final   • Basophils, Absolute 07/13/2020 0.03  0.00 - 0.20 10*3/mm3 Final   Lab on 06/19/2020   Component Date Value Ref Range Status   • Color, UA 06/19/2020 Yellow  Yellow, Straw Final   • Appearance, UA 06/19/2020 Turbid* Clear Final   • pH, UA 06/19/2020 <=5.0  5.0 - 8.0 Final   • Specific Gravity, UA 06/19/2020 1.026  1.005 - 1.030 Final   • Glucose, UA 06/19/2020 Negative  Negative Final   • Ketones, UA 06/19/2020 Negative  Negative Final   • Bilirubin, UA 06/19/2020 Negative  Negative Final   •  Blood, UA 06/19/2020 Negative  Negative Final   • Protein, UA 06/19/2020 Negative  Negative Final   • Leuk Esterase, UA 06/19/2020 Negative  Negative Final   • Nitrite, UA 06/19/2020 Negative  Negative Final   • Urobilinogen, UA 06/19/2020 0.2 E.U./dL  0.2 - 1.0 E.U./dL Final   • RBC, UA 06/19/2020 None Seen  None Seen, 0-2 /HPF Final   • WBC, UA 06/19/2020 None Seen  None Seen, 0-2 /HPF Final   • Bacteria, UA 06/19/2020 None Seen  None Seen /HPF Final   • Squamous Epithelial Cells, UA 06/19/2020 3-6* None Seen, 0-2 /HPF Final   • Hyaline Casts, UA 06/19/2020 None Seen  None Seen /LPF Final   • Methodology 06/19/2020 Manual Light Microscopy   Final   • WBC 06/19/2020 11.47* 3.40 - 10.80 10*3/mm3 Final   • RBC 06/19/2020 4.55  4.14 - 5.80 10*6/mm3 Final   • Hemoglobin 06/19/2020 14.2  13.0 - 17.7 g/dL Final   • Hematocrit 06/19/2020 40.6  37.5 - 51.0 % Final   • MCV 06/19/2020 89.2  79.0 - 97.0 fL Final   • MCH 06/19/2020 31.2  26.6 - 33.0 pg Final   • MCHC 06/19/2020 35.0  31.5 - 35.7 g/dL Final   • RDW 06/19/2020 12.5  12.3 - 15.4 % Final   • RDW-SD 06/19/2020 40.6  37.0 - 54.0 fl Final   • MPV 06/19/2020 10.5  6.0 - 12.0 fL Final   • Platelets 06/19/2020 321  140 - 450 10*3/mm3 Final   • Neutrophil % 06/19/2020 70.6  42.7 - 76.0 % Final   • Lymphocyte % 06/19/2020 19.0* 19.6 - 45.3 % Final   • Monocyte % 06/19/2020 7.6  5.0 - 12.0 % Final   • Eosinophil % 06/19/2020 1.7  0.3 - 6.2 % Final   • Basophil % 06/19/2020 0.6  0.0 - 1.5 % Final   • Immature Grans % 06/19/2020 0.5  0.0 - 0.5 % Final   • Neutrophils, Absolute 06/19/2020 8.09* 1.70 - 7.00 10*3/mm3 Final   • Lymphocytes, Absolute 06/19/2020 2.18  0.70 - 3.10 10*3/mm3 Final   • Monocytes, Absolute 06/19/2020 0.87  0.10 - 0.90 10*3/mm3 Final   • Eosinophils, Absolute 06/19/2020 0.20  0.00 - 0.40 10*3/mm3 Final   • Basophils, Absolute 06/19/2020 0.07  0.00 - 0.20 10*3/mm3 Final   • Immature Grans, Absolute 06/19/2020 0.06* 0.00 - 0.05 10*3/mm3 Final   • nRBC  06/19/2020 0.0  0.0 - 0.2 /100 WBC Final   Office Visit on 06/19/2020   Component Date Value Ref Range Status   • Protein/Creatinine Ratio, Urine 06/19/2020 49.9  0.0 - 200.0 mg/G Crea Final   • Creatinine, Urine 06/19/2020 260.5  mg/dL Final   • Total Protein, Urine 06/19/2020 13.0  mg/dL Final   ]

## 2020-08-24 RX ORDER — POTASSIUM CHLORIDE 20 MEQ/1
TABLET, EXTENDED RELEASE ORAL
Qty: 30 TABLET | Refills: 11 | Status: SHIPPED | OUTPATIENT
Start: 2020-08-24 | End: 2021-01-08 | Stop reason: SDUPTHER

## 2020-09-04 ENCOUNTER — HOSPITAL ENCOUNTER (OUTPATIENT)
Dept: ULTRASOUND IMAGING | Facility: HOSPITAL | Age: 55
Discharge: HOME | End: 2020-09-04

## 2020-09-04 ENCOUNTER — HOSPITAL ENCOUNTER (OUTPATIENT)
Dept: ULTRASOUND IMAGING | Facility: HOSPITAL | Age: 55
Discharge: HOME | End: 2020-09-04
Admitting: NURSE PRACTITIONER

## 2020-09-04 DIAGNOSIS — N50.82 SCROTAL PAIN: ICD-10-CM

## 2020-09-04 PROCEDURE — 76870 US EXAM SCROTUM: CPT

## 2020-09-04 PROCEDURE — 93976 VASCULAR STUDY: CPT

## 2020-09-25 DIAGNOSIS — I10 ESSENTIAL HYPERTENSION: ICD-10-CM

## 2020-09-25 RX ORDER — HYOSCYAMINE SULFATE EXTENDED-RELEASE 0.38 MG/1
0.38 TABLET ORAL NIGHTLY
Qty: 30 TABLET | Refills: 1 | Status: SHIPPED | OUTPATIENT
Start: 2020-09-25 | End: 2021-01-07 | Stop reason: SDUPTHER

## 2020-09-25 RX ORDER — ATENOLOL 25 MG/1
50 TABLET ORAL DAILY
Qty: 60 TABLET | Refills: 5 | Status: SHIPPED | OUTPATIENT
Start: 2020-09-25 | End: 2021-06-25 | Stop reason: SDUPTHER

## 2020-09-28 RX ORDER — BECLOMETHASONE DIPROPIONATE HFA 40 UG/1
AEROSOL, METERED RESPIRATORY (INHALATION)
Qty: 10.6 G | Refills: 0 | Status: SHIPPED | OUTPATIENT
Start: 2020-09-28 | End: 2021-06-16 | Stop reason: ALTCHOICE

## 2020-09-28 RX ORDER — OLMESARTAN MEDOXOMIL 20 MG/1
TABLET ORAL
Qty: 30 TABLET | Refills: 0 | Status: SHIPPED | OUTPATIENT
Start: 2020-09-28 | End: 2020-10-23

## 2020-10-09 ENCOUNTER — CLINICAL SUPPORT (OUTPATIENT)
Dept: FAMILY MEDICINE CLINIC | Facility: CLINIC | Age: 55
End: 2020-10-09

## 2020-10-09 VITALS
HEART RATE: 75 BPM | WEIGHT: 235.8 LBS | RESPIRATION RATE: 18 BRPM | SYSTOLIC BLOOD PRESSURE: 124 MMHG | OXYGEN SATURATION: 99 % | TEMPERATURE: 98.4 F | DIASTOLIC BLOOD PRESSURE: 74 MMHG | HEIGHT: 63 IN | BODY MASS INDEX: 41.78 KG/M2

## 2020-10-09 DIAGNOSIS — Z02.89 ENCOUNTER FOR EXAMINATION REQUIRED BY DEPARTMENT OF TRANSPORTATION (DOT): Primary | ICD-10-CM

## 2020-10-09 DIAGNOSIS — I10 ESSENTIAL HYPERTENSION: ICD-10-CM

## 2020-10-09 PROCEDURE — DOTPHY: Performed by: NURSE PRACTITIONER

## 2020-10-09 NOTE — PROGRESS NOTES
Rupesh Valencia Jr is a 55 y.o. male who presents to the office for a DOT Exam. See Federal DOT examination form for details of this visit.      This document has been electronically signed by JOHNNY Tarango on October 9, 2020 15:39 CDT    There are no Patient Instructions on file for this visit.   Return in about 1 year (around 10/9/2021) for for DOT exam.

## 2020-10-22 DIAGNOSIS — I20.9 ANGINAL PAIN (HCC): ICD-10-CM

## 2020-10-23 RX ORDER — ISOSORBIDE MONONITRATE 30 MG/1
30 TABLET, EXTENDED RELEASE ORAL DAILY
Qty: 90 TABLET | Refills: 1 | Status: SHIPPED | OUTPATIENT
Start: 2020-10-23 | End: 2021-02-23

## 2020-10-23 RX ORDER — OLMESARTAN MEDOXOMIL 20 MG/1
20 TABLET ORAL DAILY
Qty: 90 TABLET | Refills: 1 | Status: SHIPPED | OUTPATIENT
Start: 2020-10-23 | End: 2021-03-15

## 2021-01-05 ENCOUNTER — LAB (OUTPATIENT)
Dept: LAB | Facility: OTHER | Age: 56
End: 2021-01-05

## 2021-01-05 LAB
ALBUMIN SERPL-MCNC: 4 G/DL (ref 3.5–5)
ALBUMIN/GLOB SERPL: 1.1 G/DL (ref 1.1–1.8)
ALP SERPL-CCNC: 109 U/L (ref 38–126)
ALT SERPL W P-5'-P-CCNC: 13 U/L
ANION GAP SERPL CALCULATED.3IONS-SCNC: 5 MMOL/L (ref 5–15)
AST SERPL-CCNC: 19 U/L (ref 17–59)
BASOPHILS # BLD AUTO: 0.03 10*3/MM3 (ref 0–0.2)
BASOPHILS NFR BLD AUTO: 0.2 % (ref 0–1.5)
BILIRUB SERPL-MCNC: 0.6 MG/DL (ref 0.2–1.3)
BUN SERPL-MCNC: 23 MG/DL (ref 7–23)
BUN/CREAT SERPL: 20.5 (ref 7–25)
CALCIUM SPEC-SCNC: 9.1 MG/DL (ref 8.4–10.2)
CHLORIDE SERPL-SCNC: 102 MMOL/L (ref 101–112)
CHOLEST SERPL-MCNC: 193 MG/DL (ref 150–200)
CO2 SERPL-SCNC: 28 MMOL/L (ref 22–30)
CREAT SERPL-MCNC: 1.12 MG/DL (ref 0.7–1.3)
DEPRECATED RDW RBC AUTO: 46.5 FL (ref 37–54)
EOSINOPHIL # BLD AUTO: 0.29 10*3/MM3 (ref 0–0.4)
EOSINOPHIL NFR BLD AUTO: 2.3 % (ref 0.3–6.2)
ERYTHROCYTE [DISTWIDTH] IN BLOOD BY AUTOMATED COUNT: 14.1 % (ref 12.3–15.4)
GFR SERPL CREATININE-BSD FRML MDRD: 68 ML/MIN/1.73 (ref 56–130)
GLOBULIN UR ELPH-MCNC: 3.5 GM/DL (ref 2.3–3.5)
GLUCOSE SERPL-MCNC: 101 MG/DL (ref 70–99)
HCT VFR BLD AUTO: 42.6 % (ref 37.5–51)
HDLC SERPL-MCNC: 91 MG/DL (ref 40–59)
HGB BLD-MCNC: 14.3 G/DL (ref 13–17.7)
LDLC SERPL CALC-MCNC: 84 MG/DL
LDLC/HDLC SERPL: 0.89 {RATIO} (ref 0–3.55)
LYMPHOCYTES # BLD AUTO: 2.16 10*3/MM3 (ref 0.7–3.1)
LYMPHOCYTES NFR BLD AUTO: 16.8 % (ref 19.6–45.3)
MCH RBC QN AUTO: 31.1 PG (ref 26.6–33)
MCHC RBC AUTO-ENTMCNC: 33.6 G/DL (ref 31.5–35.7)
MCV RBC AUTO: 92.6 FL (ref 79–97)
MONOCYTES # BLD AUTO: 1.08 10*3/MM3 (ref 0.1–0.9)
MONOCYTES NFR BLD AUTO: 8.4 % (ref 5–12)
NEUTROPHILS NFR BLD AUTO: 72.3 % (ref 42.7–76)
NEUTROPHILS NFR BLD AUTO: 9.3 10*3/MM3 (ref 1.7–7)
PLATELET # BLD AUTO: 281 10*3/MM3 (ref 140–450)
PMV BLD AUTO: 9.7 FL (ref 6–12)
POTASSIUM SERPL-SCNC: 4 MMOL/L (ref 3.4–5)
PROT SERPL-MCNC: 7.5 G/DL (ref 6.3–8.6)
RBC # BLD AUTO: 4.6 10*6/MM3 (ref 4.14–5.8)
SODIUM SERPL-SCNC: 135 MMOL/L (ref 137–145)
TRIGL SERPL-MCNC: 103 MG/DL
VLDLC SERPL-MCNC: 18 MG/DL (ref 5–40)
WBC # BLD AUTO: 12.86 10*3/MM3 (ref 3.4–10.8)

## 2021-01-05 PROCEDURE — 84403 ASSAY OF TOTAL TESTOSTERONE: CPT | Performed by: INTERNAL MEDICINE

## 2021-01-05 PROCEDURE — 83036 HEMOGLOBIN GLYCOSYLATED A1C: CPT | Performed by: INTERNAL MEDICINE

## 2021-01-05 PROCEDURE — 80061 LIPID PANEL: CPT | Performed by: INTERNAL MEDICINE

## 2021-01-05 PROCEDURE — 36415 COLL VENOUS BLD VENIPUNCTURE: CPT | Performed by: INTERNAL MEDICINE

## 2021-01-05 PROCEDURE — 80053 COMPREHEN METABOLIC PANEL: CPT | Performed by: INTERNAL MEDICINE

## 2021-01-05 PROCEDURE — 85025 COMPLETE CBC W/AUTO DIFF WBC: CPT | Performed by: INTERNAL MEDICINE

## 2021-01-05 PROCEDURE — 82670 ASSAY OF TOTAL ESTRADIOL: CPT | Performed by: INTERNAL MEDICINE

## 2021-01-06 LAB
ESTRADIOL SERPL HS-MCNC: 266 PG/ML
HBA1C MFR BLD: 5.19 % (ref 4.8–5.6)
TESTOST SERPL-MCNC: 189 NG/DL (ref 193–740)

## 2021-01-07 RX ORDER — HYOSCYAMINE SULFATE EXTENDED-RELEASE 0.38 MG/1
0.38 TABLET ORAL NIGHTLY
Qty: 30 TABLET | Refills: 1 | Status: SHIPPED | OUTPATIENT
Start: 2021-01-07 | End: 2021-02-09 | Stop reason: SDUPTHER

## 2021-01-08 ENCOUNTER — OFFICE VISIT (OUTPATIENT)
Dept: ENDOCRINOLOGY | Facility: CLINIC | Age: 56
End: 2021-01-08

## 2021-01-08 VITALS
WEIGHT: 248.4 LBS | SYSTOLIC BLOOD PRESSURE: 118 MMHG | DIASTOLIC BLOOD PRESSURE: 78 MMHG | BODY MASS INDEX: 44 KG/M2 | HEART RATE: 57 BPM | OXYGEN SATURATION: 97 %

## 2021-01-08 DIAGNOSIS — E06.3 HYPOTHYROIDISM DUE TO HASHIMOTO'S THYROIDITIS: ICD-10-CM

## 2021-01-08 DIAGNOSIS — E03.8 HYPOTHYROIDISM DUE TO HASHIMOTO'S THYROIDITIS: ICD-10-CM

## 2021-01-08 DIAGNOSIS — Z78.9 MALE-TO-FEMALE TRANSGENDER PERSON: Primary | ICD-10-CM

## 2021-01-08 DIAGNOSIS — R73.01 IMPAIRED FASTING GLUCOSE: ICD-10-CM

## 2021-01-08 DIAGNOSIS — I10 ESSENTIAL HYPERTENSION: ICD-10-CM

## 2021-01-08 PROBLEM — N18.30 CKD (CHRONIC KIDNEY DISEASE) STAGE 3, GFR 30-59 ML/MIN (HCC): Status: RESOLVED | Noted: 2017-02-24 | Resolved: 2021-01-08

## 2021-01-08 PROCEDURE — 99214 OFFICE O/P EST MOD 30 MIN: CPT | Performed by: INTERNAL MEDICINE

## 2021-01-08 RX ORDER — ESTRADIOL 2 MG/1
TABLET ORAL
Qty: 150 TABLET | Refills: 5 | Status: SHIPPED | OUTPATIENT
Start: 2021-01-08 | End: 2021-06-15

## 2021-01-08 RX ORDER — FINASTERIDE 5 MG/1
5 TABLET, FILM COATED ORAL DAILY
Qty: 30 TABLET | Refills: 11 | Status: SHIPPED | OUTPATIENT
Start: 2021-01-08 | End: 2021-06-15

## 2021-01-08 RX ORDER — POTASSIUM CHLORIDE 20 MEQ/1
20 TABLET, EXTENDED RELEASE ORAL DAILY
Qty: 30 TABLET | Refills: 11 | Status: SHIPPED | OUTPATIENT
Start: 2021-01-08 | End: 2021-06-15

## 2021-01-08 NOTE — PROGRESS NOTES
Chief Complaint  Follow-up (male to female )    Subjective          Rupesh Valencia Jr presents to [facility] for   History of Present Illness  followup      reason , transgender Male to Female     duration, since about age 49 years old      timing, constant desire     quality, presently controlled on HRT detailed below    She  has no concerns            Objective   Vital Signs:   /78 (BP Location: Right arm, Patient Position: Sitting, Cuff Size: Large Adult)   Pulse 57   Wt 113 kg (248 lb 6.4 oz)   SpO2 97%   BMI 44.00 kg/m²     Physical Exam  Constitutional:       Appearance: Normal appearance.   HENT:      Head: Normocephalic.   Neck:      Musculoskeletal: Normal range of motion and neck supple.   Cardiovascular:      Rate and Rhythm: Normal rate and regular rhythm.   Pulmonary:      Effort: Pulmonary effort is normal.      Breath sounds: Normal breath sounds.   Musculoskeletal:      Right lower leg: No edema.      Left lower leg: No edema.   Neurological:      Mental Status: He is alert.        Result Review :   The following data was reviewed by: Fausto Morrison MD on 01/08/2021:  Common labs    Common Labsle 6/19/20 6/19/20 6/19/20 6/19/20 7/13/20 1/5/21 1/5/21 1/5/21 1/5/21    1024 1024 1024 1024  1145 1145 1145 1145   BUN   15    23     Creatinine   1.09    1.12     eGFR Non African Am   70    68     Sodium   137    135 (A)     Potassium   4.2    4.0     Chloride   100    102     Calcium   9.6    9.1     Albumin   4.00    4.00     Total Bilirubin   0.4    0.6     Alkaline Phosphatase   102    109     AST (SGOT)   7    19     ALT (SGPT)   8    13     WBC  11.47 (A)   12.77 (A) 12.86 (A)      Hemoglobin  14.2   13.3 14.3      Hematocrit  40.6   38.4 42.6      Platelets  321   270 281      Triglycerides    81    103    HDL Cholesterol    80 (A)    91 (A)    LDL Cholesterol     89    84    Hemoglobin A1C 5.27        5.19   (A) Abnormal value         "      Thyroid Workup    Lab Results   Component Value Date    TSH 2.740 06/19/2020    TSH 1.880 01/17/2020    TSH 2.630 12/16/2019       Lab Results   Component Value Date    FREET4 1.02 01/17/2020    FREET4 0.92 (L) 10/11/2019          TPO antibodies    Lab Results   Component Value Date    THYROIDAB 18 10/11/2019         Lab Results   Component Value Date    THGAB <1.0 10/11/2019       Component      Latest Ref Rng & Units 1/5/2021   Testosterone, Total      193.00 - 740.00 ng/dL 189.00 (L)   Estradiol      pg/mL 266.0                    Assessment and Plan    Problem List Items Addressed This Visit        Other    Male-to-female transgender person - Primary    Essential hypertension      Other Visit Diagnoses     Hypothyroidism due to Hashimoto's thyroiditis        Impaired fasting glucose               Transgender Male to Female                baseline prolactin ( nl ), estradiol (17)  testosterone ( 378)     Estradiol 10 mg daily   no aldactone due to ARF on HCTZ      finasteride 5 mg daily       progesterone 100 mg daily -        Presently not interested in surgery             HTN     Controlled on atenolol, imdur, benicar     imdur was written by cardiology around 2014 for \"small vessels\" but no CAD    Takes 20 meq KCL daily that I wrote when he was taking a diuretic, K remains nl on it     IFG     Metformin effective at 500 mg qam     Hypothyroidism    On levothyroxine 25 mcgs daily started by Ms. Coello   I will add TSH to my labs to monitor        Follow Up   No follow-ups on file.  Patient was given instructions and counseling regarding his condition or for health maintenance advice. Please see specific information pulled into the AVS if appropriate.       "

## 2021-01-28 ENCOUNTER — OFFICE VISIT (OUTPATIENT)
Dept: GASTROENTEROLOGY | Facility: CLINIC | Age: 56
End: 2021-01-28

## 2021-01-28 VITALS
HEART RATE: 58 BPM | WEIGHT: 253.2 LBS | HEIGHT: 62 IN | DIASTOLIC BLOOD PRESSURE: 87 MMHG | BODY MASS INDEX: 46.59 KG/M2 | SYSTOLIC BLOOD PRESSURE: 162 MMHG

## 2021-01-28 DIAGNOSIS — K58.2 IRRITABLE BOWEL SYNDROME WITH BOTH CONSTIPATION AND DIARRHEA: Primary | ICD-10-CM

## 2021-01-28 DIAGNOSIS — Z91.018 MULTIPLE FOOD ALLERGIES: ICD-10-CM

## 2021-01-28 DIAGNOSIS — K76.0 FATTY LIVER: ICD-10-CM

## 2021-01-28 PROCEDURE — 99213 OFFICE O/P EST LOW 20 MIN: CPT | Performed by: PHYSICIAN ASSISTANT

## 2021-01-28 RX ORDER — METRONIDAZOLE 500 MG/1
500 TABLET ORAL 2 TIMES DAILY
Qty: 20 TABLET | Refills: 0 | Status: SHIPPED | OUTPATIENT
Start: 2021-01-28 | End: 2021-09-24

## 2021-02-09 RX ORDER — HYOSCYAMINE SULFATE EXTENDED-RELEASE 0.38 MG/1
0.38 TABLET ORAL 2 TIMES DAILY
Qty: 60 TABLET | Refills: 5 | Status: SHIPPED | OUTPATIENT
Start: 2021-02-09 | End: 2021-04-01 | Stop reason: SDUPTHER

## 2021-02-22 ENCOUNTER — OFFICE VISIT (OUTPATIENT)
Dept: FAMILY MEDICINE CLINIC | Facility: CLINIC | Age: 56
End: 2021-02-22

## 2021-02-22 DIAGNOSIS — J01.01 ACUTE RECURRENT MAXILLARY SINUSITIS: Primary | ICD-10-CM

## 2021-02-22 PROCEDURE — 99442 PR PHYS/QHP TELEPHONE EVALUATION 11-20 MIN: CPT | Performed by: NURSE PRACTITIONER

## 2021-02-22 RX ORDER — LEVOFLOXACIN 500 MG/1
500 TABLET, FILM COATED ORAL DAILY
Qty: 10 TABLET | Refills: 0 | Status: SHIPPED | OUTPATIENT
Start: 2021-02-22 | End: 2021-06-14 | Stop reason: SDUPTHER

## 2021-02-22 RX ORDER — PREDNISONE 20 MG/1
TABLET ORAL
Qty: 17 TABLET | Refills: 0 | Status: SHIPPED | OUTPATIENT
Start: 2021-02-22 | End: 2021-04-21

## 2021-02-22 RX ORDER — MONTELUKAST SODIUM 10 MG/1
TABLET ORAL
COMMUNITY
Start: 2020-11-23 | End: 2022-01-14

## 2021-02-22 NOTE — PROGRESS NOTES
Subjective   Rupesh Valencia Jr is a 55 y.o. male.   You have chosen to receive care through a telephone visit. Do you consent to use a telephone visit for your medical care today? Yes  16 minutes medical discussion.     Chief Complaint   Patient presents with   • Sinus Problem     times 3 weeks        History of Present Illness     Past 2 weeks increasing and constant pain and pressure over frontal and maxillary sinuses bilaterally.  He is having increased postnasal drainage nasal congestion and thick yellow drainage from his nose.  Occasional cough.  No fevers or chills but he just feels horrible.  He is allergic to Augmentin.  I think this is probably an acute exacerbation of a chronic sinusitis and recommend CT of the sinuses at this time in addition to treatment.  He prefers to do treatment and if this does not work then he wants to do the CT.    No other complaints today    Parts of most recent relevant visit HPI, ROS  and PE may be carried forward and all are updated as appropriate for current situation.    Past Surgical History:   Procedure Laterality Date   • CARDIAC CATHETERIZATION  07/09/2015    Normal coronaries. Normal LV systolic and diastolic function.   • COLONOSCOPY N/A 11/1/2019    Procedure: COLONOSCOPY;  Surgeon: Srinivas Forte MD;  Location: [facility] ENDOSCOPY;  Service: Gastroenterology   • EYE SURGERY      x3   • INJECTION OF MEDICATION  11/16/2015    Depo Medrol (Methylprednisone) 80mg (1)     • INJECTION OF MEDICATION  03/24/2014    Inj(s) Tend-Sheath, Ligament, Single 90376 (2)    • INJECTION OF MEDICATION  03/24/2014    Kenalog (2)     • INJECTION OF MEDICATION  07/14/2016    Rocephin (2)      Social History     Socioeconomic History   • Marital status:      Spouse name: Not on file   • Number of children: Not on file   • Years of education: Not on file   • Highest education level: Not on file   Tobacco Use   • Smoking status: Never Smoker   • Smokeless tobacco: Never Used    Substance and Sexual Activity   • Alcohol use: Yes     Comment: occasional   • Drug use: No   • Sexual activity: Defer      The following portions of the patient's history were reviewed and updated as appropriate: allergies, current medications, past family history, past medical history, past social history, past surgical history and problem list.    Review of Systems   Constitutional: Negative.  Negative for activity change, appetite change, chills, diaphoresis, fatigue, fever and unexpected weight loss.   HENT: Positive for congestion, postnasal drip and sinus pressure. Negative for ear pain, rhinorrhea, sneezing, sore throat, swollen glands, trouble swallowing and voice change.    Eyes: Negative.  Negative for discharge, redness, itching and visual disturbance.   Respiratory: Positive for cough. Negative for shortness of breath, wheezing and stridor.    Cardiovascular: Negative.  Negative for chest pain and leg swelling.   Gastrointestinal: Negative.  Negative for diarrhea, nausea and vomiting.   Endocrine: Negative.  Negative for polydipsia, polyphagia and polyuria.   Genitourinary: Negative.  Negative for dysuria.   Musculoskeletal: Negative.  Negative for arthralgias.   Skin: Negative.    Allergic/Immunologic: Negative.    Neurological: Negative.    Hematological: Negative.    Psychiatric/Behavioral: Negative.  Negative for behavioral problems, dysphoric mood, sleep disturbance, suicidal ideas and depressed mood. The patient is not nervous/anxious.    All other systems reviewed and are negative.    PHQ-9 Depression Screening  Little interest or pleasure in doing things?     Feeling down, depressed, or hopeless?     Trouble falling or staying asleep, or sleeping too much?     Feeling tired or having little energy?     Poor appetite or overeating?     Feeling bad about yourself - or that you are a failure or have let yourself or your family down?     Trouble concentrating on things, such as reading the  newspaper or watching television?     Moving or speaking so slowly that other people could have noticed? Or the opposite - being so fidgety or restless that you have been moving around a lot more than usual?     Thoughts that you would be better off dead, or of hurting yourself in some way?     PHQ-9 Total Score     If you checked off any problems, how difficult have these problems made it for you to do your work, take care of things at home, or get along with other people?        Objective   Physical Exam  Vitals signs and nursing note reviewed.   Constitutional:       General: He is not in acute distress.     Appearance: Ill appearance: .  Telephonic visit today.  Present signs of acute respiratory illness voice is nasal with obvious congestion slight cough and throat clearing and sniffling during interview.   Pulmonary:      Effort: Pulmonary effort is normal. No respiratory distress.      Breath sounds: No stridor.      Comments: Able to speak in full sentences without dyspnea.  Neurological:      Mental Status: He is alert and oriented to person, place, and time. Mental status is at baseline.   Psychiatric:         Mood and Affect: Mood normal.         Behavior: Behavior normal.         Thought Content: Thought content normal.         Judgment: Judgment normal.           Assessment/Plan   Diagnoses and all orders for this visit:    1. Acute recurrent maxillary sinusitis (Primary)  -     levoFLOXacin (Levaquin) 500 MG tablet; Take 1 tablet by mouth Daily.  Dispense: 10 tablet; Refill: 0  -     predniSONE (DELTASONE) 20 MG tablet; Take 3 tablets with first am meal daily on days 1,2, 3, then 2 tablets daily on days 4,5,6 then 1 tablet daily on days 7 and 8.  Dispense: 17 tablet; Refill: 0      Return in about 3 days (around 2/25/2021).               This document has been electronically signed by JOHNNY Tarango on February 22, 2021 16:18 CST

## 2021-02-23 DIAGNOSIS — I20.9 ANGINAL PAIN (HCC): ICD-10-CM

## 2021-02-23 RX ORDER — METFORMIN HYDROCHLORIDE 500 MG/1
500 TABLET, EXTENDED RELEASE ORAL
Qty: 90 TABLET | Refills: 1 | Status: SHIPPED | OUTPATIENT
Start: 2021-02-23 | End: 2021-05-20

## 2021-02-23 RX ORDER — ISOSORBIDE MONONITRATE 30 MG/1
30 TABLET, EXTENDED RELEASE ORAL DAILY
Qty: 90 TABLET | Refills: 1 | Status: SHIPPED | OUTPATIENT
Start: 2021-02-23 | End: 2022-01-17

## 2021-03-15 RX ORDER — OLMESARTAN MEDOXOMIL 20 MG/1
20 TABLET ORAL DAILY
Qty: 90 TABLET | Refills: 1 | Status: SHIPPED | OUTPATIENT
Start: 2021-03-15 | End: 2022-01-14

## 2021-04-01 RX ORDER — HYOSCYAMINE SULFATE EXTENDED-RELEASE 0.38 MG/1
0.38 TABLET ORAL 2 TIMES DAILY
Qty: 60 TABLET | Refills: 2 | Status: SHIPPED | OUTPATIENT
Start: 2021-04-01 | End: 2021-09-24

## 2021-04-08 ENCOUNTER — DOCUMENTATION (OUTPATIENT)
Dept: GASTROENTEROLOGY | Facility: CLINIC | Age: 56
End: 2021-04-08

## 2021-05-20 RX ORDER — METFORMIN HYDROCHLORIDE 500 MG/1
500 TABLET, EXTENDED RELEASE ORAL
Qty: 90 TABLET | Refills: 1 | Status: SHIPPED | OUTPATIENT
Start: 2021-05-20 | End: 2021-09-24

## 2021-05-20 RX ORDER — LEVOTHYROXINE SODIUM 0.03 MG/1
25 TABLET ORAL DAILY
Qty: 90 TABLET | Refills: 3 | Status: SHIPPED | OUTPATIENT
Start: 2021-05-20 | End: 2022-02-18 | Stop reason: SDUPTHER

## 2021-06-14 ENCOUNTER — LAB (OUTPATIENT)
Dept: LAB | Facility: OTHER | Age: 56
End: 2021-06-14

## 2021-06-14 ENCOUNTER — DOCUMENTATION (OUTPATIENT)
Dept: FAMILY MEDICINE CLINIC | Facility: CLINIC | Age: 56
End: 2021-06-14

## 2021-06-14 ENCOUNTER — OFFICE VISIT (OUTPATIENT)
Dept: FAMILY MEDICINE CLINIC | Facility: CLINIC | Age: 56
End: 2021-06-14

## 2021-06-14 VITALS
DIASTOLIC BLOOD PRESSURE: 80 MMHG | OXYGEN SATURATION: 99 % | BODY MASS INDEX: 44.56 KG/M2 | TEMPERATURE: 98.3 F | SYSTOLIC BLOOD PRESSURE: 146 MMHG | HEIGHT: 63 IN | HEART RATE: 83 BPM | WEIGHT: 251.5 LBS | RESPIRATION RATE: 16 BRPM

## 2021-06-14 DIAGNOSIS — E66.01 MORBID OBESITY WITH BMI OF 45.0-49.9, ADULT (HCC): ICD-10-CM

## 2021-06-14 DIAGNOSIS — R39.9 LOWER URINARY TRACT SYMPTOMS (LUTS): ICD-10-CM

## 2021-06-14 DIAGNOSIS — E88.81 METABOLIC SYNDROME X: ICD-10-CM

## 2021-06-14 DIAGNOSIS — N39.0 URINARY TRACT INFECTION WITH HEMATURIA, SITE UNSPECIFIED: ICD-10-CM

## 2021-06-14 DIAGNOSIS — N50.811 RIGHT TESTICULAR PAIN: ICD-10-CM

## 2021-06-14 DIAGNOSIS — R31.9 URINARY TRACT INFECTION WITH HEMATURIA, SITE UNSPECIFIED: ICD-10-CM

## 2021-06-14 DIAGNOSIS — E16.1 HYPERINSULINEMIA: ICD-10-CM

## 2021-06-14 DIAGNOSIS — N50.812 LEFT TESTICULAR PAIN: ICD-10-CM

## 2021-06-14 DIAGNOSIS — E55.9 VITAMIN D DEFICIENCY: ICD-10-CM

## 2021-06-14 DIAGNOSIS — E87.6 HYPOKALEMIA: ICD-10-CM

## 2021-06-14 DIAGNOSIS — R73.9 HYPERGLYCEMIA: ICD-10-CM

## 2021-06-14 DIAGNOSIS — J01.11 ACUTE RECURRENT FRONTAL SINUSITIS: ICD-10-CM

## 2021-06-14 DIAGNOSIS — D72.829 LEUKOCYTOSIS, UNSPECIFIED TYPE: ICD-10-CM

## 2021-06-14 DIAGNOSIS — N50.812 LEFT TESTICULAR PAIN: Primary | ICD-10-CM

## 2021-06-14 DIAGNOSIS — I10 ESSENTIAL HYPERTENSION: ICD-10-CM

## 2021-06-14 LAB
ALBUMIN SERPL-MCNC: 4.3 G/DL (ref 3.5–5)
ALBUMIN/GLOB SERPL: 1.2 G/DL (ref 1.1–1.8)
ALP SERPL-CCNC: 110 U/L (ref 38–126)
ALT SERPL W P-5'-P-CCNC: 13 U/L
ANION GAP SERPL CALCULATED.3IONS-SCNC: 7 MMOL/L (ref 5–15)
AST SERPL-CCNC: 27 U/L (ref 17–59)
BACTERIA UR QL AUTO: ABNORMAL /HPF
BASOPHILS # BLD AUTO: 0.02 10*3/MM3 (ref 0–0.2)
BASOPHILS NFR BLD AUTO: 0.2 % (ref 0–1.5)
BILIRUB SERPL-MCNC: 0.5 MG/DL (ref 0.2–1.3)
BILIRUB UR QL STRIP: NEGATIVE
BUN SERPL-MCNC: 16 MG/DL (ref 7–23)
BUN/CREAT SERPL: 14 (ref 7–25)
CALCIUM SPEC-SCNC: 9.7 MG/DL (ref 8.4–10.2)
CHLORIDE SERPL-SCNC: 106 MMOL/L (ref 101–112)
CLARITY UR: CLEAR
CO2 SERPL-SCNC: 27 MMOL/L (ref 22–30)
COD CRY URNS QL: ABNORMAL /HPF
COLOR UR: YELLOW
CREAT SERPL-MCNC: 1.14 MG/DL (ref 0.7–1.3)
DEPRECATED RDW RBC AUTO: 44.1 FL (ref 37–54)
EOSINOPHIL # BLD AUTO: 0.34 10*3/MM3 (ref 0–0.4)
EOSINOPHIL NFR BLD AUTO: 3.6 % (ref 0.3–6.2)
ERYTHROCYTE [DISTWIDTH] IN BLOOD BY AUTOMATED COUNT: 13.9 % (ref 12.3–15.4)
GFR SERPL CREATININE-BSD FRML MDRD: 66 ML/MIN/1.73 (ref 56–130)
GLOBULIN UR ELPH-MCNC: 3.7 GM/DL (ref 2.3–3.5)
GLUCOSE SERPL-MCNC: 104 MG/DL (ref 70–99)
GLUCOSE UR STRIP-MCNC: NEGATIVE MG/DL
HCT VFR BLD AUTO: 41.7 % (ref 37.5–51)
HGB BLD-MCNC: 14.4 G/DL (ref 13–17.7)
HGB UR QL STRIP.AUTO: ABNORMAL
HYALINE CASTS UR QL AUTO: ABNORMAL /LPF
KETONES UR QL STRIP: NEGATIVE
LEUKOCYTE ESTERASE UR QL STRIP.AUTO: NEGATIVE
LYMPHOCYTES # BLD AUTO: 2.27 10*3/MM3 (ref 0.7–3.1)
LYMPHOCYTES NFR BLD AUTO: 24.2 % (ref 19.6–45.3)
MCH RBC QN AUTO: 30.9 PG (ref 26.6–33)
MCHC RBC AUTO-ENTMCNC: 34.5 G/DL (ref 31.5–35.7)
MCV RBC AUTO: 89.5 FL (ref 79–97)
MONOCYTES # BLD AUTO: 0.99 10*3/MM3 (ref 0.1–0.9)
MONOCYTES NFR BLD AUTO: 10.6 % (ref 5–12)
MUCOUS THREADS URNS QL MICRO: ABNORMAL /HPF
NEUTROPHILS NFR BLD AUTO: 5.75 10*3/MM3 (ref 1.7–7)
NEUTROPHILS NFR BLD AUTO: 61.4 % (ref 42.7–76)
NITRITE UR QL STRIP: NEGATIVE
PH UR STRIP.AUTO: <=5 [PH] (ref 5.5–8)
PLATELET # BLD AUTO: 277 10*3/MM3 (ref 140–450)
PMV BLD AUTO: 9.8 FL (ref 6–12)
POTASSIUM SERPL-SCNC: 3.6 MMOL/L (ref 3.4–5)
PROT SERPL-MCNC: 8 G/DL (ref 6.3–8.6)
PROT UR QL STRIP: ABNORMAL
RBC # BLD AUTO: 4.66 10*6/MM3 (ref 4.14–5.8)
RBC # UR: ABNORMAL /HPF
REF LAB TEST METHOD: ABNORMAL
SODIUM SERPL-SCNC: 140 MMOL/L (ref 137–145)
SP GR UR STRIP: >=1.03 (ref 1–1.03)
SQUAMOUS #/AREA URNS HPF: ABNORMAL /HPF
UROBILINOGEN UR QL STRIP: ABNORMAL
WBC # BLD AUTO: 9.37 10*3/MM3 (ref 3.4–10.8)
WBC UR QL AUTO: ABNORMAL /HPF

## 2021-06-14 PROCEDURE — 82670 ASSAY OF TOTAL ESTRADIOL: CPT | Performed by: INTERNAL MEDICINE

## 2021-06-14 PROCEDURE — 87086 URINE CULTURE/COLONY COUNT: CPT | Performed by: NURSE PRACTITIONER

## 2021-06-14 PROCEDURE — 81001 URINALYSIS AUTO W/SCOPE: CPT | Performed by: NURSE PRACTITIONER

## 2021-06-14 PROCEDURE — 84443 ASSAY THYROID STIM HORMONE: CPT | Performed by: INTERNAL MEDICINE

## 2021-06-14 PROCEDURE — 84403 ASSAY OF TOTAL TESTOSTERONE: CPT | Performed by: INTERNAL MEDICINE

## 2021-06-14 PROCEDURE — 36415 COLL VENOUS BLD VENIPUNCTURE: CPT | Performed by: INTERNAL MEDICINE

## 2021-06-14 PROCEDURE — 83036 HEMOGLOBIN GLYCOSYLATED A1C: CPT | Performed by: INTERNAL MEDICINE

## 2021-06-14 PROCEDURE — 80053 COMPREHEN METABOLIC PANEL: CPT | Performed by: INTERNAL MEDICINE

## 2021-06-14 PROCEDURE — 84146 ASSAY OF PROLACTIN: CPT | Performed by: INTERNAL MEDICINE

## 2021-06-14 PROCEDURE — 85025 COMPLETE CBC W/AUTO DIFF WBC: CPT | Performed by: NURSE PRACTITIONER

## 2021-06-14 PROCEDURE — 99214 OFFICE O/P EST MOD 30 MIN: CPT | Performed by: NURSE PRACTITIONER

## 2021-06-14 RX ORDER — TAMSULOSIN HYDROCHLORIDE 0.4 MG/1
1 CAPSULE ORAL DAILY
Qty: 30 CAPSULE | Refills: 0 | Status: SHIPPED | OUTPATIENT
Start: 2021-06-14 | End: 2021-07-08 | Stop reason: SDUPTHER

## 2021-06-14 RX ORDER — PEN NEEDLE, DIABETIC 30 GX3/16"
1 NEEDLE, DISPOSABLE MISCELLANEOUS
Qty: 30 EACH | Refills: 1 | Status: SHIPPED | OUTPATIENT
Start: 2021-06-14 | End: 2022-02-18 | Stop reason: SDUPTHER

## 2021-06-14 RX ORDER — LEVOFLOXACIN 500 MG/1
500 TABLET, FILM COATED ORAL DAILY
Qty: 10 TABLET | Refills: 0 | Status: SHIPPED | OUTPATIENT
Start: 2021-06-14 | End: 2021-09-24

## 2021-06-14 NOTE — PROGRESS NOTES
Subjective   Rupesh Valencia Jr is a 56 y.o. male.       Chief Complaint   Patient presents with   • Diabetes     wanting ozempic   • Groin Pain        History of Present Illness     Patient presents for 24 hour history of initially severe, then somewhat receding lower abdominal pain (bilateral) over level of round ligaments, and evolving into bilateral testicular pain, left greater than right. No  Hematuria, no hematospermia, no swelling and no overlying skin changes. No known injury. Woke up in pain that worsened over the day Saturday, continuing to worsen to severe by Sunday night but has largely resolved this am. No dysuria no hesitancy and no urinary frequency current nor recent. Currently on Flomax 0.4mg daily for LUTS, will advise increase to 0.8mg daily x 10 days or until stones pass, then revert to daily dosing with 0.4mg.   No fever, chills, n/v.    Initially appointment was made because patient is prediabetic and desiring to try ozempic for sugars and for weight loss, as had heard on media that Ozempic had been approved for weight loss. Will issue samples of ozempic and initiate an order and see if we can get it PA'd with insurance.   Body mass index is 44.55 kg/m².  Weight today is 251# 8 oz, HT is 63 in.     No other complaints today.     Parts of most recent relevant visit HPI, ROS  and PE may be carried forward and all are updated as appropriate for current situation.    Past Surgical History:   Procedure Laterality Date   • CARDIAC CATHETERIZATION  07/09/2015    Normal coronaries. Normal LV systolic and diastolic function.   • COLONOSCOPY N/A 11/1/2019    Procedure: COLONOSCOPY;  Surgeon: Srinivas Forte MD;  Location: [facility] ENDOSCOPY;  Service: Gastroenterology   • EYE SURGERY      x3   • INJECTION OF MEDICATION  11/16/2015    Depo Medrol (Methylprednisone) 80mg (1)     • INJECTION OF MEDICATION  03/24/2014    Inj(s) Tend-Sheath, Ligament, Single 58601 (2)    • INJECTION OF MEDICATION   03/24/2014    Kenalog (2)     • INJECTION OF MEDICATION  07/14/2016    Rocephin (2)      Social History     Socioeconomic History   • Marital status:      Spouse name: Not on file   • Number of children: Not on file   • Years of education: Not on file   • Highest education level: Not on file   Tobacco Use   • Smoking status: Never Smoker   • Smokeless tobacco: Never Used   Vaping Use   • Vaping Use: Never used   Substance and Sexual Activity   • Alcohol use: Yes     Comment: occasional   • Drug use: No   • Sexual activity: Defer      The following portions of the patient's history were reviewed and updated as appropriate: He  has a past medical history of Acute sinusitis, Benign essential hypertension, Carpal tunnel syndrome, Chest pain, Chronic sinusitis, Cough, Diabetes mellitus without complication (CMS/HCC), Diarrhea of presumed infectious origin, Dyspnea, Dyspnea on exertion, Dysuria, Essential hypertension, Gender dysphoria, Hyperkeratosis, Hypogonadism in male (2/24/2017), Impaired fasting blood sugar, Inflamed seborrheic keratosis, Male hypogonadism, Male-to-female transgender person (2/24/2017), Male-to-female transsexuality, Migraine, Morbid obesity due to excess calories (CMS/HCC) (2/24/2017), Obesity, Pain in joint, Poisoning due to venomous spider, Renal impairment, Routine general medical examination at a health care facility, Special screening for malignant neoplasm of prostate, and Vitamin D deficiency (2/24/2017)..    Review of Systems   Constitutional: Negative.  Negative for activity change, appetite change, chills, fever and unexpected weight loss.   HENT: Negative.  Negative for ear pain, postnasal drip, rhinorrhea, sinus pressure, sneezing, sore throat, swollen glands, trouble swallowing and voice change.    Eyes: Negative.  Negative for discharge, redness, itching and visual disturbance.   Respiratory: Negative for cough, shortness of breath and wheezing.    Cardiovascular: Negative.   "Negative for chest pain and leg swelling.   Gastrointestinal: Negative.    Endocrine: Negative.  Negative for polydipsia, polyphagia and polyuria.   Genitourinary: Positive for testicular pain. Negative for difficulty urinating and dysuria.   Musculoskeletal: Positive for arthralgias.   Allergic/Immunologic: Negative.    Neurological: Negative.    Hematological: Negative.    Psychiatric/Behavioral: Negative for behavioral problems, dysphoric mood, sleep disturbance, suicidal ideas and depressed mood. The patient is nervous/anxious.    All other systems reviewed and are negative.    PHQ-9 Depression Screening  Little interest or pleasure in doing things?     Feeling down, depressed, or hopeless?     Trouble falling or staying asleep, or sleeping too much?     Feeling tired or having little energy?     Poor appetite or overeating?     Feeling bad about yourself - or that you are a failure or have let yourself or your family down?     Trouble concentrating on things, such as reading the newspaper or watching television?     Moving or speaking so slowly that other people could have noticed? Or the opposite - being so fidgety or restless that you have been moving around a lot more than usual?     Thoughts that you would be better off dead, or of hurting yourself in some way?     PHQ-9 Total Score     If you checked off any problems, how difficult have these problems made it for you to do your work, take care of things at home, or get along with other people?        Objective    Vitals:    06/14/21 1350   BP: 146/80   BP Location: Left arm   Patient Position: Sitting   Cuff Size: Adult   Pulse: 83   Resp: 16   Temp: 98.3 °F (36.8 °C)   SpO2: 99%   Weight: 114 kg (251 lb 8 oz)   Height: 160 cm (63\")   PainSc:   2   PainLoc: Groin       Physical Exam  Vitals and nursing note reviewed.   Constitutional:       General: He is not in acute distress.     Appearance: Normal appearance. He is well-developed. He is not " ill-appearing or diaphoretic.   HENT:      Head: Normocephalic and atraumatic.   Eyes:      General: No scleral icterus.        Right eye: No discharge.         Left eye: No discharge.      Conjunctiva/sclera: Conjunctivae normal.      Pupils: Pupils are equal, round, and reactive to light.   Neck:      Thyroid: No thyromegaly.      Vascular: No carotid bruit or JVD.      Trachea: No tracheal deviation.   Cardiovascular:      Rate and Rhythm: Normal rate and regular rhythm.      Pulses: Normal pulses.      Heart sounds: Normal heart sounds. No murmur heard.   No friction rub. No gallop.    Pulmonary:      Effort: Pulmonary effort is normal. No respiratory distress.      Breath sounds: Normal breath sounds. No stridor. No wheezing, rhonchi or rales.   Chest:      Chest wall: No tenderness.   Abdominal:      General: Bowel sounds are normal.      Palpations: Abdomen is soft.   Genitourinary:     Testes:         Right: Tenderness present. Mass or swelling not present.         Left: Tenderness present. Mass or swelling not present.   Musculoskeletal:         General: No tenderness or deformity. Normal range of motion.      Cervical back: Normal range of motion and neck supple. No rigidity or tenderness.      Right lower leg: No edema.      Left lower leg: No edema.   Lymphadenopathy:      Cervical: No cervical adenopathy.   Skin:     General: Skin is warm and dry.      Capillary Refill: Capillary refill takes 2 to 3 seconds.      Coloration: Skin is not jaundiced or pale.      Findings: No bruising, erythema, lesion or rash.   Neurological:      General: No focal deficit present.      Mental Status: He is alert and oriented to person, place, and time. Mental status is at baseline.      Motor: No weakness.      Gait: Gait normal.   Psychiatric:         Mood and Affect: Mood normal.         Behavior: Behavior normal.         Thought Content: Thought content normal.         Judgment: Judgment normal.     US testicles,  scrotum, no acute findings. Small left epididymal cyst 0.6mm benign appearing, small bilateral hydroceles, chronic.   US trace bacteria and trace blood, most likely has passed a stone. May have others in there, may have an infection. Will treat with levaquin and briefly increase dose of flomax to 0.8mg while on Levaquin. Culture is pending. Urine cytology is pending.     Assessment/Plan   Diagnoses and all orders for this visit:    1. Left testicular pain (Primary)  -     Urinalysis With Microscopic - Urine, Clean Catch; Future  -     Urine Culture - Urine, Urine, Clean Catch  -     Cytology, Urine  -     US Scrotum & Testicles  -     US Testicular or Ovarian Vascular Limited  -     CBC & Differential; Future    2. Right testicular pain  -     Urinalysis With Microscopic - Urine, Clean Catch; Future  -     Urine Culture - Urine, Urine, Clean Catch  -     Cytology, Urine  -     US Scrotum & Testicles  -     US Testicular or Ovarian Vascular Limited  -     CBC & Differential; Future    3. Hyperinsulinemia  -     Semaglutide, 1 MG/DOSE, (OZEMPIC) 2 MG/1.5ML solution pen-injector; Inject into skin dose ordered, 1 x every 7 days: 0.25mg on 6/14/21, 0.5mg on 6/21; 0.75 mg in 6/28 and 1mg weekly ongoing.  Dispense: 1 pen; Refill: 0  -     Insulin Pen Needle (Pen Needles) 32G X 4 MM misc; 1 each Every 7 (Seven) Days. For injection with Ozempic  Dispense: 30 each; Refill: 1  -     Semaglutide, 1 MG/DOSE, (OZEMPIC) 2 MG/1.5ML solution pen-injector; Inject 1 mg under the skin into the appropriate area as directed 1 (One) Time Per Week.  Dispense: 2 pen; Refill: 11  -     Hemoglobin A1c    4. Morbid obesity with BMI of 45.0-49.9, adult (CMS/formerly [facility])  -     Semaglutide, 1 MG/DOSE, (OZEMPIC) 2 MG/1.5ML solution pen-injector; Inject into skin dose ordered, 1 x every 7 days: 0.25mg on 6/14/21, 0.5mg on 6/21; 0.75 mg in 6/28 and 1mg weekly ongoing.  Dispense: 1 pen; Refill: 0  -     Insulin Pen Needle (Pen Needles) 32G X 4 MM misc; 1  each Every 7 (Seven) Days. For injection with Ozempic  Dispense: 30 each; Refill: 1  -     Semaglutide, 1 MG/DOSE, (OZEMPIC) 2 MG/1.5ML solution pen-injector; Inject 1 mg under the skin into the appropriate area as directed 1 (One) Time Per Week.  Dispense: 2 pen; Refill: 11  -     Hemoglobin A1c    5. Hyperglycemia  -     Semaglutide, 1 MG/DOSE, (OZEMPIC) 2 MG/1.5ML solution pen-injector; Inject into skin dose ordered, 1 x every 7 days: 0.25mg on 6/14/21, 0.5mg on 6/21; 0.75 mg in 6/28 and 1mg weekly ongoing.  Dispense: 1 pen; Refill: 0  -     Insulin Pen Needle (Pen Needles) 32G X 4 MM misc; 1 each Every 7 (Seven) Days. For injection with Ozempic  Dispense: 30 each; Refill: 1  -     Semaglutide, 1 MG/DOSE, (OZEMPIC) 2 MG/1.5ML solution pen-injector; Inject 1 mg under the skin into the appropriate area as directed 1 (One) Time Per Week.  Dispense: 2 pen; Refill: 11  -     Comprehensive Metabolic Panel  -     Hemoglobin A1c    6. Essential hypertension  -     Comprehensive Metabolic Panel  -     T4, Free  -     TSH  -     T3    7. Vitamin D deficiency    8. Metabolic syndrome X  -     Comprehensive Metabolic Panel  -     Lipid Panel  -     Hemoglobin A1c    9. Urinary tract infection with hematuria, site unspecified  -     levoFLOXacin (Levaquin) 500 MG tablet; Take 1 tablet by mouth Daily.  Dispense: 10 tablet; Refill: 0    10. Lower urinary tract symptoms (LUTS)        Return in about 1 week (around 6/21/2021).             This document has been electronically signed by JOHNNY Tarango on June 14, 2021 16:02 CDT

## 2021-06-15 LAB
BACTERIA SPEC AEROBE CULT: NO GROWTH
ESTRADIOL SERPL HS-MCNC: 38.5 PG/ML
HBA1C MFR BLD: 5.13 % (ref 4.8–5.6)
PROLACTIN SERPL-MCNC: 13.1 NG/ML (ref 4.04–15.2)
TESTOST SERPL-MCNC: 404 NG/DL (ref 193–740)
TSH SERPL DL<=0.05 MIU/L-ACNC: 2.02 UIU/ML (ref 0.27–4.2)

## 2021-06-15 RX ORDER — FINASTERIDE 5 MG/1
5 TABLET, FILM COATED ORAL DAILY
Qty: 60 TABLET | Refills: 11 | Status: SHIPPED | OUTPATIENT
Start: 2021-06-15 | End: 2022-02-18 | Stop reason: SDUPTHER

## 2021-06-15 RX ORDER — POTASSIUM CHLORIDE 20 MEQ/1
TABLET, EXTENDED RELEASE ORAL
Qty: 90 TABLET | Refills: 11 | Status: SHIPPED | OUTPATIENT
Start: 2021-06-15 | End: 2022-02-18 | Stop reason: SDUPTHER

## 2021-06-15 RX ORDER — ESTRADIOL 2 MG/1
TABLET ORAL
Qty: 150 TABLET | Refills: 11 | Status: SHIPPED | OUTPATIENT
Start: 2021-06-15 | End: 2022-06-24 | Stop reason: SDUPTHER

## 2021-06-15 RX ORDER — PROGESTERONE 100 MG/1
CAPSULE ORAL
Qty: 30 CAPSULE | Refills: 5 | Status: SHIPPED | OUTPATIENT
Start: 2021-06-15 | End: 2021-06-28

## 2021-06-16 ENCOUNTER — DOCUMENTATION (OUTPATIENT)
Dept: FAMILY MEDICINE CLINIC | Facility: CLINIC | Age: 56
End: 2021-06-16

## 2021-06-16 LAB
LAB AP CASE REPORT: NORMAL
PATH REPORT.FINAL DX SPEC: NORMAL

## 2021-06-16 RX ORDER — BUDESONIDE AND FORMOTEROL FUMARATE DIHYDRATE 160; 4.5 UG/1; UG/1
2 AEROSOL RESPIRATORY (INHALATION) 2 TIMES DAILY
Refills: 5 | Status: CANCELLED | OUTPATIENT
Start: 2021-06-16

## 2021-06-17 ENCOUNTER — TELEPHONE (OUTPATIENT)
Dept: ENDOCRINOLOGY | Facility: CLINIC | Age: 56
End: 2021-06-17

## 2021-06-17 DIAGNOSIS — J45.40 MODERATE PERSISTENT ASTHMA WITHOUT COMPLICATION: Primary | ICD-10-CM

## 2021-06-17 RX ORDER — BUDESONIDE AND FORMOTEROL FUMARATE DIHYDRATE 160; 4.5 UG/1; UG/1
2 AEROSOL RESPIRATORY (INHALATION)
Qty: 1 EACH | Refills: 12 | Status: CANCELLED | OUTPATIENT
Start: 2021-06-17

## 2021-06-17 RX ORDER — BUDESONIDE AND FORMOTEROL FUMARATE DIHYDRATE 160; 4.5 UG/1; UG/1
AEROSOL RESPIRATORY (INHALATION)
Qty: 10.2 G | Refills: 5 | Status: SHIPPED | OUTPATIENT
Start: 2021-06-17 | End: 2022-02-18 | Stop reason: SDUPTHER

## 2021-06-17 NOTE — TELEPHONE ENCOUNTER
Pt is needing a PA to be done on the     Progesterone (PROMETRIUM) 100 MG capsule [[id number]] (Order [id number])

## 2021-06-21 ENCOUNTER — DOCUMENTATION (OUTPATIENT)
Dept: ENDOCRINOLOGY | Facility: CLINIC | Age: 56
End: 2021-06-21

## 2021-06-21 ENCOUNTER — TELEPHONE (OUTPATIENT)
Dept: ENDOCRINOLOGY | Facility: CLINIC | Age: 56
End: 2021-06-21

## 2021-06-21 NOTE — TELEPHONE ENCOUNTER
Pt left a vm that he was waiting on the PA for his Progesterone, however it looks like it was denied. Please advise.

## 2021-06-24 NOTE — TELEPHONE ENCOUNTER
SPOKE WITH PHARMACIST - HE WILL TRY TO RUN AS BRAND NAME PROMETRIUM. HE WILL CALL BACK TOMORROW REGARDING THIS.

## 2021-06-24 NOTE — TELEPHONE ENCOUNTER
Pt called back asking about this, he wanted to speak with a nurse, I let him know we would give him a call back.

## 2021-06-25 ENCOUNTER — TELEPHONE (OUTPATIENT)
Dept: ENDOCRINOLOGY | Facility: CLINIC | Age: 56
End: 2021-06-25

## 2021-06-25 DIAGNOSIS — I10 ESSENTIAL HYPERTENSION: ICD-10-CM

## 2021-06-25 RX ORDER — ATENOLOL 25 MG/1
50 TABLET ORAL DAILY
Qty: 60 TABLET | Refills: 5 | Status: SHIPPED | OUTPATIENT
Start: 2021-06-25 | End: 2021-09-20

## 2021-06-25 NOTE — TELEPHONE ENCOUNTER
[facility] - [place], [place] - [facility]  - [phone]  - [phone]      Left a vm stating that they spoke with Payt yesterday and they are needing a call back please

## 2021-06-25 NOTE — TELEPHONE ENCOUNTER
Pt needs not just a PA but an appeal fpr the Progesterone 100 MG capsules     Needs letter  justification     Has mary lou BC / BS   Pt says he needs a call to let him know something

## 2021-06-28 ENCOUNTER — TELEPHONE (OUTPATIENT)
Dept: ENDOCRINOLOGY | Facility: CLINIC | Age: 56
End: 2021-06-28

## 2021-06-28 RX ORDER — PROGESTERONE 100 MG/1
CAPSULE ORAL
Qty: 30 CAPSULE | Refills: 11 | Status: SHIPPED | OUTPATIENT
Start: 2021-06-28 | End: 2022-06-27

## 2021-07-02 ENCOUNTER — TELEPHONE (OUTPATIENT)
Dept: FAMILY MEDICINE CLINIC | Facility: CLINIC | Age: 56
End: 2021-07-02

## 2021-07-06 ENCOUNTER — OFFICE VISIT (OUTPATIENT)
Dept: FAMILY MEDICINE CLINIC | Facility: CLINIC | Age: 56
End: 2021-07-06

## 2021-07-06 VITALS
TEMPERATURE: 98.7 F | OXYGEN SATURATION: 99 % | HEART RATE: 84 BPM | HEIGHT: 63 IN | SYSTOLIC BLOOD PRESSURE: 126 MMHG | RESPIRATION RATE: 16 BRPM | WEIGHT: 245.2 LBS | BODY MASS INDEX: 43.45 KG/M2 | DIASTOLIC BLOOD PRESSURE: 77 MMHG

## 2021-07-06 DIAGNOSIS — K58.2 IRRITABLE BOWEL SYNDROME WITH BOTH CONSTIPATION AND DIARRHEA: ICD-10-CM

## 2021-07-06 DIAGNOSIS — R21 PERINEAL RASH: ICD-10-CM

## 2021-07-06 DIAGNOSIS — R11.2 INTRACTABLE NAUSEA AND VOMITING: ICD-10-CM

## 2021-07-06 DIAGNOSIS — R10.12 LUQ ABDOMINAL PAIN: Primary | ICD-10-CM

## 2021-07-06 DIAGNOSIS — K59.09 CHRONIC CONSTIPATION: ICD-10-CM

## 2021-07-06 PROCEDURE — 99214 OFFICE O/P EST MOD 30 MIN: CPT | Performed by: NURSE PRACTITIONER

## 2021-07-06 RX ORDER — CLOTRIMAZOLE AND BETAMETHASONE DIPROPIONATE 10; .64 MG/G; MG/G
CREAM TOPICAL 2 TIMES DAILY
Qty: 45 G | Refills: 5 | Status: SHIPPED | OUTPATIENT
Start: 2021-07-06

## 2021-07-06 RX ORDER — AMITRIPTYLINE HYDROCHLORIDE 25 MG/1
25 TABLET, FILM COATED ORAL NIGHTLY
Qty: 90 TABLET | Refills: 3 | Status: SHIPPED | OUTPATIENT
Start: 2021-07-06 | End: 2022-02-18 | Stop reason: SDUPTHER

## 2021-07-06 NOTE — PROGRESS NOTES
Subjective   Rupesh Valencia Jr. is a 56 y.o. male.       Chief Complaint   Patient presents with   • Pain     left side        History of Present Illness     LUQ abb pain and LLQ abd pain radiating up to LUQ abd pain, onset Saturday night. Associated with nausea. Reports chronic diarrhea, with a change in bowel habits recently. Associated with onset of pain/ nausea came constipation, recent onset. Previously diagnosed with IBS by JOHNNY Mukherjee and treated with amitriptyline successfully, this was changed about a year ago to a different TCA due to increased anxiety. Anxiety well controlled, will switch back to amitriptyline. Colonoscopy current in last 5 years by Dr Heredia, jean Cortes PA-C routinely.    Discussed possible etiologies including most likely being diverticulitis. CT abd needed. If pain increases or symptoms worsen before this approved, go to ED.  Rash of thigh/groin folds, no open areas, some dry, flaking skin and mild itch, uncomfortable. Has tried and failed multiple OTC antifungal powders and creams.      No other complaints today.     Parts of most recent relevant visit HPI, ROS  and PE may be carried forward and all are updated as appropriate for current situation.    Past Surgical History:   Procedure Laterality Date   • CARDIAC CATHETERIZATION  07/09/2015    Normal coronaries. Normal LV systolic and diastolic function.   • COLONOSCOPY N/A 11/1/2019    Procedure: COLONOSCOPY;  Surgeon: Srinivas Forte MD;  Location: [facility] ENDOSCOPY;  Service: Gastroenterology   • EYE SURGERY      x3   • INJECTION OF MEDICATION  11/16/2015    Depo Medrol (Methylprednisone) 80mg (1)     • INJECTION OF MEDICATION  03/24/2014    Inj(s) Tend-Sheath, Ligament, Single 86741 (2)    • INJECTION OF MEDICATION  03/24/2014    Kenalog (2)     • INJECTION OF MEDICATION  07/14/2016    Rocephin (2)      Social History     Socioeconomic History   • Marital status:      Spouse name: Not on file   •  Number of children: Not on file   • Years of education: Not on file   • Highest education level: Not on file   Tobacco Use   • Smoking status: Never Smoker   • Smokeless tobacco: Never Used   Vaping Use   • Vaping Use: Never used   Substance and Sexual Activity   • Alcohol use: Yes     Comment: occasional   • Drug use: No   • Sexual activity: Defer      The following portions of the patient's history were reviewed and updated as appropriate: He  has a past medical history of Acute sinusitis, Benign essential hypertension, Carpal tunnel syndrome, Chest pain, Chronic sinusitis, Cough, Diabetes mellitus without complication (CMS/HCC), Diarrhea of presumed infectious origin, Dyspnea, Dyspnea on exertion, Dysuria, Essential hypertension, Gender dysphoria, Hyperkeratosis, Hypogonadism in male (2/24/2017), Impaired fasting blood sugar, Inflamed seborrheic keratosis, Male hypogonadism, Male-to-female transgender person (2/24/2017), Male-to-female transsexuality, Migraine, Morbid obesity due to excess calories (CMS/HCC) (2/24/2017), Obesity, Pain in joint, Poisoning due to venomous spider, Renal impairment, Routine general medical examination at a health care facility, Special screening for malignant neoplasm of prostate, and Vitamin D deficiency (2/24/2017)..    Review of Systems   Constitutional: Negative.  Negative for activity change, appetite change, chills, fever and unexpected weight loss.   HENT: Negative.  Negative for ear pain, postnasal drip, rhinorrhea, sinus pressure, sneezing, sore throat, swollen glands, trouble swallowing and voice change.    Eyes: Negative.  Negative for discharge, redness, itching and visual disturbance.   Respiratory: Negative for cough, shortness of breath and wheezing.    Cardiovascular: Negative.  Negative for chest pain and leg swelling.   Gastrointestinal: Negative.    Endocrine: Negative.  Negative for polydipsia, polyphagia and polyuria.   Genitourinary: Positive for testicular  "pain. Negative for difficulty urinating and dysuria.   Musculoskeletal: Positive for arthralgias.   Skin: Negative.    Allergic/Immunologic: Negative.    Neurological: Negative.    Hematological: Negative.    Psychiatric/Behavioral: Negative for behavioral problems, dysphoric mood, sleep disturbance, suicidal ideas and depressed mood. The patient is nervous/anxious.    All other systems reviewed and are negative.    PHQ-9 Depression Screening  Little interest or pleasure in doing things?     Feeling down, depressed, or hopeless?     Trouble falling or staying asleep, or sleeping too much?     Feeling tired or having little energy?     Poor appetite or overeating?     Feeling bad about yourself - or that you are a failure or have let yourself or your family down?     Trouble concentrating on things, such as reading the newspaper or watching television?     Moving or speaking so slowly that other people could have noticed? Or the opposite - being so fidgety or restless that you have been moving around a lot more than usual?     Thoughts that you would be better off dead, or of hurting yourself in some way?     PHQ-9 Total Score     If you checked off any problems, how difficult have these problems made it for you to do your work, take care of things at home, or get along with other people?        Objective    Vitals:    07/06/21 1353   BP: 126/77   BP Location: Left arm   Patient Position: Sitting   Cuff Size: Adult   Pulse: 84   Resp: 16   Temp: 98.7 °F (37.1 °C)   SpO2: 99%   Weight: 111 kg (245 lb 3.2 oz)   Height: 160 cm (63\")   PainSc:   2   PainLoc: Abdomen       Physical Exam  Vitals and nursing note reviewed.   Constitutional:       General: He is not in acute distress.     Appearance: Normal appearance. He is well-developed. He is not ill-appearing or diaphoretic.   HENT:      Head: Normocephalic and atraumatic.   Eyes:      General: No scleral icterus.        Right eye: No discharge.         Left eye: No " discharge.      Conjunctiva/sclera: Conjunctivae normal.      Pupils: Pupils are equal, round, and reactive to light.   Neck:      Thyroid: No thyromegaly.      Vascular: No carotid bruit or JVD.      Trachea: No tracheal deviation.   Cardiovascular:      Rate and Rhythm: Normal rate and regular rhythm.      Pulses: Normal pulses.      Heart sounds: Normal heart sounds. No murmur heard.   No friction rub. No gallop.    Pulmonary:      Effort: Pulmonary effort is normal. No respiratory distress.      Breath sounds: Normal breath sounds. No stridor. No wheezing, rhonchi or rales.   Chest:      Chest wall: No tenderness.   Abdominal:      General: Bowel sounds are normal.      Palpations: Abdomen is soft.   Genitourinary:     Testes:         Right: Tenderness present. Mass or swelling not present.         Left: Tenderness present. Mass or swelling not present.   Musculoskeletal:         General: No tenderness or deformity. Normal range of motion.      Cervical back: Normal range of motion and neck supple. No rigidity or tenderness.      Right lower leg: No edema.      Left lower leg: No edema.   Lymphadenopathy:      Cervical: No cervical adenopathy.   Skin:     General: Skin is warm and dry.      Capillary Refill: Capillary refill takes 2 to 3 seconds.      Coloration: Skin is not jaundiced or pale.      Findings: No bruising, erythema, lesion or rash.   Neurological:      General: No focal deficit present.      Mental Status: He is alert and oriented to person, place, and time. Mental status is at baseline.      Motor: No weakness.      Gait: Gait normal.   Psychiatric:         Mood and Affect: Mood normal.         Behavior: Behavior normal.         Thought Content: Thought content normal.         Judgment: Judgment normal.         Assessment/Plan   Diagnoses and all orders for this visit:    1. LUQ abdominal pain (Primary)  -     CT Abdomen Pelvis With & Without Contrast; Future    2. Intractable nausea and  vomiting  -     CT Abdomen Pelvis With & Without Contrast; Future    3. Chronic constipation  -     CT Abdomen Pelvis With & Without Contrast; Future  -     linaclotide (Linzess) 72 MCG capsule capsule; Take 1 capsule by mouth Every Morning Before Breakfast.  Dispense: 30 capsule; Refill: 0    4. Irritable bowel syndrome with both constipation and diarrhea  -     amitriptyline (ELAVIL) 25 MG tablet; Take 1 tablet by mouth Every Night. For IBS symptoms  Dispense: 90 tablet; Refill: 3  -     linaclotide (Linzess) 72 MCG capsule capsule; Take 1 capsule by mouth Every Morning Before Breakfast.  Dispense: 30 capsule; Refill: 0    5. Perineal rash  -     clotrimazole-betamethasone (Lotrisone) 1-0.05 % cream; Apply  topically to the appropriate area as directed 2 (Two) Times a Day.  Dispense: 45 g; Refill: 5    Return in about 2 years (around 7/6/2023).                 This document has been electronically signed by JOHNNY Tarango on July 6, 2021 15:07 CDT

## 2021-07-08 RX ORDER — TAMSULOSIN HYDROCHLORIDE 0.4 MG/1
1 CAPSULE ORAL DAILY
Qty: 30 CAPSULE | Refills: 1 | Status: SHIPPED | OUTPATIENT
Start: 2021-07-08 | End: 2021-09-20

## 2021-09-17 DIAGNOSIS — I10 ESSENTIAL HYPERTENSION: ICD-10-CM

## 2021-09-20 RX ORDER — ATENOLOL 25 MG/1
50 TABLET ORAL DAILY
Qty: 180 TABLET | Refills: 5 | Status: SHIPPED | OUTPATIENT
Start: 2021-09-20 | End: 2022-02-18 | Stop reason: SDUPTHER

## 2021-09-20 RX ORDER — TAMSULOSIN HYDROCHLORIDE 0.4 MG/1
1 CAPSULE ORAL DAILY
Qty: 30 CAPSULE | Refills: 1 | Status: SHIPPED | OUTPATIENT
Start: 2021-09-20 | End: 2022-02-18

## 2021-09-24 ENCOUNTER — CLINICAL SUPPORT (OUTPATIENT)
Dept: FAMILY MEDICINE CLINIC | Facility: CLINIC | Age: 56
End: 2021-09-24

## 2021-09-24 VITALS
WEIGHT: 238.9 LBS | BODY MASS INDEX: 42.33 KG/M2 | OXYGEN SATURATION: 99 % | DIASTOLIC BLOOD PRESSURE: 70 MMHG | SYSTOLIC BLOOD PRESSURE: 124 MMHG | TEMPERATURE: 98.2 F | RESPIRATION RATE: 16 BRPM | HEIGHT: 63 IN | HEART RATE: 85 BPM

## 2021-09-24 PROCEDURE — DOTPHY: Performed by: NURSE PRACTITIONER

## 2021-09-24 NOTE — PROGRESS NOTES
Rupesh Valencia  is a 56 y.o. male who presents to the office for a DOT Exam. See Federal DOT examination form for details of this visit.      This document has been electronically signed by JOHNNY Tarango on September 24, 2021 08:10 CDT      Return in about 1 year (around 9/24/2022) for for DOT examination.

## 2021-10-11 ENCOUNTER — LAB (OUTPATIENT)
Dept: LAB | Facility: OTHER | Age: 56
End: 2021-10-11

## 2021-10-11 ENCOUNTER — TELEPHONE (OUTPATIENT)
Dept: FAMILY MEDICINE CLINIC | Facility: CLINIC | Age: 56
End: 2021-10-11

## 2021-10-11 DIAGNOSIS — R35.0 URINARY FREQUENCY: ICD-10-CM

## 2021-10-11 DIAGNOSIS — R35.0 URINARY FREQUENCY: Primary | ICD-10-CM

## 2021-10-11 LAB
BACTERIA UR QL AUTO: ABNORMAL /HPF
BILIRUB UR QL STRIP: NEGATIVE
CLARITY UR: CLEAR
COLOR UR: YELLOW
GLUCOSE UR STRIP-MCNC: NEGATIVE MG/DL
HGB UR QL STRIP.AUTO: NEGATIVE
HYALINE CASTS UR QL AUTO: ABNORMAL /LPF
KETONES UR QL STRIP: NEGATIVE
LEUKOCYTE ESTERASE UR QL STRIP.AUTO: NEGATIVE
MUCOUS THREADS URNS QL MICRO: ABNORMAL /HPF
NITRITE UR QL STRIP: NEGATIVE
PH UR STRIP.AUTO: 5.5 [PH] (ref 5.5–8)
PROT UR QL STRIP: NEGATIVE
RBC # UR: ABNORMAL /HPF
REF LAB TEST METHOD: ABNORMAL
SP GR UR STRIP: >=1.03 (ref 1–1.03)
SQUAMOUS #/AREA URNS HPF: ABNORMAL /HPF
UROBILINOGEN UR QL STRIP: NORMAL
WBC UR QL AUTO: ABNORMAL /HPF

## 2021-10-11 PROCEDURE — 81001 URINALYSIS AUTO W/SCOPE: CPT | Performed by: NURSE PRACTITIONER

## 2021-10-11 PROCEDURE — 87086 URINE CULTURE/COLONY COUNT: CPT | Performed by: NURSE PRACTITIONER

## 2021-10-11 RX ORDER — SULFAMETHOXAZOLE AND TRIMETHOPRIM 800; 160 MG/1; MG/1
1 TABLET ORAL 2 TIMES DAILY
Qty: 14 TABLET | Refills: 0 | Status: SHIPPED | OUTPATIENT
Start: 2021-10-11 | End: 2021-10-18

## 2021-10-11 NOTE — TELEPHONE ENCOUNTER
Patient calls to report decreased urinary volume and increased urinary frequency without pain in flanks or dysuria, no hematuria. Hx of recurrent renal stones. Is expecting a call to send him out of state for work at any time today. Advised will send antibiotic Bactrim prescription to take with him in case dysuria develops while out of town, and to increase current Flomax from 0.4mg daily to 0.8mg daily for now. Advised to call office or seek emergency medical assistance as always if symptoms fail to improve or worsen.     This document has been electronically signed by JOHNNY Tarango on October 11, 2021 13:09 CDT

## 2021-10-12 LAB — BACTERIA SPEC AEROBE CULT: NO GROWTH

## 2021-11-23 ENCOUNTER — TELEPHONE (OUTPATIENT)
Dept: FAMILY MEDICINE CLINIC | Facility: CLINIC | Age: 56
End: 2021-11-23

## 2022-01-14 ENCOUNTER — LAB (OUTPATIENT)
Dept: LAB | Facility: OTHER | Age: 57
End: 2022-01-14

## 2022-01-14 PROCEDURE — 87635 SARS-COV-2 COVID-19 AMP PRB: CPT | Performed by: PHYSICIAN ASSISTANT

## 2022-01-17 DIAGNOSIS — I20.9 ANGINAL PAIN: ICD-10-CM

## 2022-01-17 RX ORDER — OLMESARTAN MEDOXOMIL 20 MG/1
20 TABLET ORAL DAILY
Qty: 90 TABLET | Refills: 1 | Status: SHIPPED | OUTPATIENT
Start: 2022-01-17 | End: 2022-02-18 | Stop reason: DRUGHIGH

## 2022-01-17 RX ORDER — ISOSORBIDE MONONITRATE 30 MG/1
30 TABLET, EXTENDED RELEASE ORAL DAILY
Qty: 90 TABLET | Refills: 1 | Status: SHIPPED | OUTPATIENT
Start: 2022-01-17 | End: 2022-02-18 | Stop reason: SDUPTHER

## 2022-02-18 ENCOUNTER — OFFICE VISIT (OUTPATIENT)
Dept: FAMILY MEDICINE CLINIC | Facility: CLINIC | Age: 57
End: 2022-02-18

## 2022-02-18 VITALS
HEIGHT: 63 IN | RESPIRATION RATE: 16 BRPM | TEMPERATURE: 98 F | BODY MASS INDEX: 40.88 KG/M2 | HEART RATE: 65 BPM | OXYGEN SATURATION: 99 % | SYSTOLIC BLOOD PRESSURE: 160 MMHG | DIASTOLIC BLOOD PRESSURE: 90 MMHG | WEIGHT: 230.7 LBS

## 2022-02-18 DIAGNOSIS — E87.6 HYPOKALEMIA: Chronic | ICD-10-CM

## 2022-02-18 DIAGNOSIS — E53.8 VITAMIN B 12 DEFICIENCY: Chronic | ICD-10-CM

## 2022-02-18 DIAGNOSIS — R39.9 LOWER URINARY TRACT SYMPTOMS (LUTS): Chronic | ICD-10-CM

## 2022-02-18 DIAGNOSIS — I10 ESSENTIAL HYPERTENSION: Chronic | ICD-10-CM

## 2022-02-18 DIAGNOSIS — E55.9 VITAMIN D DEFICIENCY: ICD-10-CM

## 2022-02-18 DIAGNOSIS — R73.9 HYPERGLYCEMIA: Chronic | ICD-10-CM

## 2022-02-18 DIAGNOSIS — I20.9 ANGINAL PAIN: ICD-10-CM

## 2022-02-18 DIAGNOSIS — M79.2 PERIPHERAL NEUROPATHIC PAIN: ICD-10-CM

## 2022-02-18 DIAGNOSIS — R06.09 EXERTIONAL DYSPNEA: Chronic | ICD-10-CM

## 2022-02-18 DIAGNOSIS — Z13.220 SCREENING, LIPID: ICD-10-CM

## 2022-02-18 DIAGNOSIS — Z51.81 ENCOUNTER FOR THERAPEUTIC DRUG LEVEL MONITORING: ICD-10-CM

## 2022-02-18 DIAGNOSIS — K58.2 IRRITABLE BOWEL SYNDROME WITH BOTH CONSTIPATION AND DIARRHEA: Chronic | ICD-10-CM

## 2022-02-18 DIAGNOSIS — E66.01 CLASS 3 SEVERE OBESITY DUE TO EXCESS CALORIES WITH SERIOUS COMORBIDITY AND BODY MASS INDEX (BMI) OF 40.0 TO 44.9 IN ADULT: Chronic | ICD-10-CM

## 2022-02-18 DIAGNOSIS — J45.40 MODERATE PERSISTENT ASTHMA WITHOUT COMPLICATION: Chronic | ICD-10-CM

## 2022-02-18 DIAGNOSIS — E88.81 METABOLIC SYNDROME X: Chronic | ICD-10-CM

## 2022-02-18 DIAGNOSIS — E16.1 HYPERINSULINEMIA: Primary | Chronic | ICD-10-CM

## 2022-02-18 DIAGNOSIS — E03.9 ACQUIRED HYPOTHYROIDISM: Chronic | ICD-10-CM

## 2022-02-18 DIAGNOSIS — J30.9 CHRONIC ALLERGIC RHINITIS: ICD-10-CM

## 2022-02-18 PROBLEM — Z78.9 MALE-TO-FEMALE TRANSGENDER PERSON: Chronic | Status: ACTIVE | Noted: 2017-02-24

## 2022-02-18 PROBLEM — J45.20 MILD INTERMITTENT ASTHMA WITHOUT COMPLICATION: Chronic | Status: ACTIVE | Noted: 2019-11-25

## 2022-02-18 PROBLEM — E29.1 HYPOGONADISM IN MALE: Chronic | Status: ACTIVE | Noted: 2017-02-24

## 2022-02-18 PROBLEM — H93.19 TINNITUS: Chronic | Status: ACTIVE | Noted: 2018-12-06

## 2022-02-18 PROBLEM — J45.20 MILD INTERMITTENT ASTHMA WITHOUT COMPLICATION: Chronic | Status: RESOLVED | Noted: 2019-11-25 | Resolved: 2022-02-18

## 2022-02-18 PROBLEM — R39.15 URINARY URGENCY: Chronic | Status: ACTIVE | Noted: 2018-08-26

## 2022-02-18 PROCEDURE — 99214 OFFICE O/P EST MOD 30 MIN: CPT | Performed by: NURSE PRACTITIONER

## 2022-02-18 RX ORDER — OLMESARTAN MEDOXOMIL 40 MG/1
40 TABLET ORAL DAILY
Qty: 90 TABLET | Refills: 1 | Status: SHIPPED | OUTPATIENT
Start: 2022-02-18 | End: 2022-03-01

## 2022-02-18 RX ORDER — ISOSORBIDE MONONITRATE 30 MG/1
30 TABLET, EXTENDED RELEASE ORAL DAILY
Qty: 90 TABLET | Refills: 1 | Status: SHIPPED | OUTPATIENT
Start: 2022-02-18 | End: 2022-08-28

## 2022-02-18 RX ORDER — FINASTERIDE 5 MG/1
5 TABLET, FILM COATED ORAL DAILY
Qty: 90 TABLET | Refills: 1 | Status: SHIPPED | OUTPATIENT
Start: 2022-02-18 | End: 2022-06-27 | Stop reason: SDUPTHER

## 2022-02-18 RX ORDER — ATENOLOL 25 MG/1
50 TABLET ORAL DAILY
Qty: 180 TABLET | Refills: 5 | Status: SHIPPED | OUTPATIENT
Start: 2022-02-18 | End: 2022-03-01

## 2022-02-18 RX ORDER — CHLORPHENIRAMINE/PHENYLPROPAN 8 MG-75 MG
3000 CAPSULE, EXTENDED RELEASE ORAL DAILY
Qty: 90 CAPSULE | Refills: 5
Start: 2022-02-18 | End: 2023-02-18

## 2022-02-18 RX ORDER — BUDESONIDE AND FORMOTEROL FUMARATE DIHYDRATE 160; 4.5 UG/1; UG/1
AEROSOL RESPIRATORY (INHALATION)
Qty: 10.2 G | Refills: 5 | Status: SHIPPED | OUTPATIENT
Start: 2022-02-18 | End: 2022-06-27

## 2022-02-18 RX ORDER — LEVOTHYROXINE SODIUM 0.03 MG/1
25 TABLET ORAL DAILY
Qty: 90 TABLET | Refills: 3 | Status: SHIPPED | OUTPATIENT
Start: 2022-02-18

## 2022-02-18 RX ORDER — ALBUTEROL SULFATE 90 UG/1
2 AEROSOL, METERED RESPIRATORY (INHALATION) EVERY 4 HOURS PRN
Qty: 18 G | Refills: 11 | Status: SHIPPED | OUTPATIENT
Start: 2022-02-18

## 2022-02-18 RX ORDER — AMITRIPTYLINE HYDROCHLORIDE 25 MG/1
25 TABLET, FILM COATED ORAL NIGHTLY
Qty: 90 TABLET | Refills: 1 | Status: SHIPPED | OUTPATIENT
Start: 2022-02-18 | End: 2022-07-12

## 2022-02-18 RX ORDER — OLMESARTAN MEDOXOMIL 20 MG/1
20 TABLET ORAL DAILY
Qty: 90 TABLET | Refills: 1 | Status: CANCELLED | OUTPATIENT
Start: 2022-02-18

## 2022-02-18 RX ORDER — PEN NEEDLE, DIABETIC 30 GX3/16"
1 NEEDLE, DISPOSABLE MISCELLANEOUS
Qty: 90 EACH | Refills: 1 | Status: SHIPPED | OUTPATIENT
Start: 2022-02-18

## 2022-02-18 RX ORDER — TAMSULOSIN HYDROCHLORIDE 0.4 MG/1
1 CAPSULE ORAL DAILY
Qty: 90 CAPSULE | Refills: 1 | Status: CANCELLED | OUTPATIENT
Start: 2022-02-18

## 2022-02-18 RX ORDER — POTASSIUM CHLORIDE 20 MEQ/1
20 TABLET, EXTENDED RELEASE ORAL DAILY
Qty: 90 TABLET | Refills: 1 | Status: SHIPPED | OUTPATIENT
Start: 2022-02-18 | End: 2022-07-12

## 2022-02-18 RX ORDER — FLUTICASONE PROPIONATE 50 MCG
1 SPRAY, SUSPENSION (ML) NASAL DAILY
Qty: 16 G | Refills: 5 | Status: SHIPPED | OUTPATIENT
Start: 2022-02-18

## 2022-02-18 RX ORDER — GABAPENTIN 300 MG/1
300 CAPSULE ORAL NIGHTLY PRN
Qty: 30 CAPSULE | Refills: 0 | Status: SHIPPED | OUTPATIENT
Start: 2022-02-18 | End: 2022-03-17

## 2022-02-18 NOTE — PROGRESS NOTES
Subjective   Rupesh Valencia Jr. is a 57 y.o. male.       Chief Complaint   Patient presents with   • leg itiching     both        History of Present Illness     has Male-to-female transgender person; Hypogonadism in male; Essential hypertension; Class 3 severe obesity due to excess calories with serious comorbidity and body mass index (BMI) of 40.0 to 44.9 in adult (HCC); Vitamin D deficiency; Other fatigue; Urinary urgency; Chronic allergic rhinitis; Tinnitus; Encounter for screening for malignant neoplasm of colon; Nasal septal perforation; Hyperinsulinemia; Vitamin B 12 deficiency; Metabolic syndrome X; Acquired hypothyroidism; Lower urinary tract symptoms (LUTS); Hypokalemia; Irritable bowel syndrome with both constipation and diarrhea; Exertional dyspnea; Hyperglycemia; and Moderate persistent asthma without complication on their problem list.     HTN: moderate elevation in office today with compliant medication use. No chest pain or dizziness.  Obesitiy, Body mass index is 40.87 kg/m². gradual improvement with lifestyle changes.  Moderate persistent asthma, well controlled with current medications. No nighttime symptoms, less than daily use of albuterol.  Hyperinsulinemia, controlled with medication.   LUTS primarily Urinary urgency:well controlled with Proscar, no longer requires flomax since renal stone passed..   IBS combined diarrhea and constipation symtpoms: currently reports regular soft stools, no abd pain.  Chronic allergic rhinitis:stable, no recent or current exacerbation.  Tinnitus, chronic, stable.   Vitamin B12 deficiency stable with replacement oral. Due for lab.  Vitamin d deficiency: stable with oral daily supplement. Due for lab.  Male to female transgender status, HRT. Follows with Dr Peter, endocrinology.     Chief complaint today: burning, stinging, itching, uncomfortable sensation of both legs from knee down. Consistent with neuropathy. No rash, no localized pain or tenderness, no  swelling.     No other complaints today.     Past Surgical History:   Procedure Laterality Date   • CARDIAC CATHETERIZATION  07/09/2015    Normal coronaries. Normal LV systolic and diastolic function.   • COLONOSCOPY N/A 11/1/2019    Procedure: COLONOSCOPY;  Surgeon: Srinivas Forte MD;  Location: [facility] ENDOSCOPY;  Service: Gastroenterology   • EYE SURGERY      x3   • INJECTION OF MEDICATION  11/16/2015    Depo Medrol (Methylprednisone) 80mg (1)     • INJECTION OF MEDICATION  03/24/2014    Inj(s) Tend-Sheath, Ligament, Single 26473 (2)    • INJECTION OF MEDICATION  03/24/2014    Kenalog (2)     • INJECTION OF MEDICATION  07/14/2016    Rocephin (2)      Social History     Socioeconomic History   • Marital status:    Tobacco Use   • Smoking status: Never Smoker   • Smokeless tobacco: Never Used   Vaping Use   • Vaping Use: Never used   Substance and Sexual Activity   • Alcohol use: Yes     Comment: occasional   • Drug use: No   • Sexual activity: Defer      The following portions of the patient's history were reviewed and updated as appropriate: allergies, current medications, past family history, past medical history, past social history, past surgical history and problem list.    Review of Systems   Constitutional: Negative.  Negative for activity change, appetite change, chills, fever and unexpected weight loss.   HENT: Negative.  Negative for ear pain, postnasal drip, rhinorrhea, sinus pressure, sneezing, sore throat, swollen glands, trouble swallowing and voice change.    Eyes: Negative.  Negative for discharge, redness, itching and visual disturbance.   Respiratory: Negative.  Negative for cough, shortness of breath and wheezing.    Cardiovascular: Negative.  Negative for chest pain and leg swelling.   Gastrointestinal: Negative.    Endocrine: Negative.  Negative for polydipsia, polyphagia and polyuria.   Genitourinary: Negative.  Negative for dysuria.   Musculoskeletal: Negative.  Negative for  "arthralgias.   Skin: Negative.    Allergic/Immunologic: Negative.    Neurological: Positive for numbness.   Hematological: Negative.    Psychiatric/Behavioral: Negative.  Negative for behavioral problems, dysphoric mood, sleep disturbance, suicidal ideas and depressed mood. The patient is not nervous/anxious.    All other systems reviewed and are negative.         PHQ-9 Depression Screening  Little interest or pleasure in doing things? 0   Feeling down, depressed, or hopeless? 0   Trouble falling or staying asleep, or sleeping too much?     Feeling tired or having little energy?     Poor appetite or overeating?     Feeling bad about yourself - or that you are a failure or have let yourself or your family down?     Trouble concentrating on things, such as reading the newspaper or watching television?     Moving or speaking so slowly that other people could have noticed? Or the opposite - being so fidgety or restless that you have been moving around a lot more than usual?     Thoughts that you would be better off dead, or of hurting yourself in some way?     PHQ-9 Total Score 0   If you checked off any problems, how difficult have these problems made it for you to do your work, take care of things at home, or get along with other people?        Objective    Vitals:    02/18/22 0808   BP: 160/90   BP Location: Left arm   Patient Position: Sitting   Cuff Size: Large Adult   Pulse: 65   Resp: 16   Temp: 98 °F (36.7 °C)   SpO2: 99%   Weight: 105 kg (230 lb 11.2 oz)   Height: 160 cm (63\")   PainSc: 0-No pain     Body mass index is 40.87 kg/m².    Physical Exam  Vitals and nursing note reviewed.   Constitutional:       General: He is not in acute distress.     Appearance: Normal appearance. He is well-developed. He is obese. He is not ill-appearing or diaphoretic.   HENT:      Head: Normocephalic and atraumatic.   Eyes:      General: No scleral icterus.        Right eye: No discharge.         Left eye: No discharge.      " Conjunctiva/sclera: Conjunctivae normal.      Pupils: Pupils are equal, round, and reactive to light.   Neck:      Thyroid: No thyromegaly.      Vascular: No carotid bruit or JVD.      Trachea: No tracheal deviation.   Cardiovascular:      Rate and Rhythm: Normal rate and regular rhythm.      Pulses: Normal pulses.      Heart sounds: Normal heart sounds. No murmur heard.  No friction rub. No gallop.    Pulmonary:      Effort: Pulmonary effort is normal. No respiratory distress.      Breath sounds: Normal breath sounds. No stridor. No wheezing, rhonchi or rales.   Chest:      Chest wall: No tenderness.   Abdominal:      General: Bowel sounds are normal.      Palpations: Abdomen is soft.   Musculoskeletal:         General: No tenderness or deformity. Normal range of motion.      Cervical back: Normal range of motion and neck supple. No rigidity or tenderness.      Right lower leg: No edema.      Left lower leg: No edema.   Lymphadenopathy:      Cervical: No cervical adenopathy.   Skin:     General: Skin is warm and dry.      Capillary Refill: Capillary refill takes 2 to 3 seconds.      Coloration: Skin is not jaundiced or pale.      Findings: No bruising (altered sensation feet and legs.), erythema, lesion or rash.   Neurological:      General: No focal deficit present.      Mental Status: He is alert and oriented to person, place, and time. Mental status is at baseline.      Sensory: Sensory deficit present.      Motor: No weakness.      Gait: Gait normal.   Psychiatric:         Mood and Affect: Mood normal.         Behavior: Behavior normal.         Thought Content: Thought content normal.         Judgment: Judgment normal.     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 Not controlled with current amitriptyline. Stop amitriptyline, start gabapentin. Contract and education re: controlled substance prescribing/ patient rights, responsibilities reviewed and signed today.   htn moderately elevated,  increased olmesartan to 40mg today. Otherwise, Stable chronic conditions, refill meds, labs due.       Assessment/Plan   Diagnoses and all orders for this visit:    1. Hyperinsulinemia (Primary)  -     Hemoglobin A1c  -     Insulin, Free & Total, Serum  -     Insulin Pen Needle (Pen Needles) 32G X 4 MM misc; 1 each Every 7 (Seven) Days. For injection with Ozempic  Dispense: 90 each; Refill: 1  -     Semaglutide, 1 MG/DOSE, (OZEMPIC) 2 MG/1.5ML solution pen-injector; Inject 1 mg under the skin into the appropriate area as directed 1 (One) Time Per Week.  Dispense: 2 pen; Refill: 5    2. Class 3 severe obesity due to excess calories with serious comorbidity and body mass index (BMI) of 40.0 to 44.9 in adult (HCC)    3. Vitamin B 12 deficiency  -     Vitamin B12 & Folate  -     cyanocobalamin (V-R VITAMIN B-12) 500 MCG tablet; Take 1 tablet by mouth Daily. OTC  Dispense: 90 tablet; Refill: 1    4. Essential hypertension  -     CBC & Differential  -     Comprehensive Metabolic Panel  -     Microalbumin / Creatinine Urine Ratio - Urine, Clean Catch  -     atenolol (TENORMIN) 25 MG tablet; Take 2 tablets by mouth Daily.  Dispense: 180 tablet; Refill: 5  -     olmesartan (BENICAR) 40 MG tablet; Take 1 tablet by mouth Daily.  Dispense: 90 tablet; Refill: 1    5. Metabolic syndrome X  -     Hemoglobin A1c  -     Insulin, Free & Total, Serum    6. Screening, lipid  -     Lipid Panel    7. Moderate persistent asthma without complication  -     budesonide-formoterol (SYMBICORT) 160-4.5 MCG/ACT inhaler; 2 puffs inhaled twice daily. May also use 2 puffs additionally every 4 hours for Shortness of breath, up to 10 puffs per day total.  Dispense: 10.2 g; Refill: 5  -     albuterol sulfate  (90 Base) MCG/ACT inhaler; Inhale 2 puffs Every 4 (Four) Hours As Needed for Wheezing or Shortness of Air.  Dispense: 18 g; Refill: 11    8. Hyperglycemia  -     Insulin Pen Needle (Pen Needles) 32G X 4 MM misc; 1 each Every 7 (Seven)  Days. For injection with Ozempic  Dispense: 90 each; Refill: 1  -     Semaglutide, 1 MG/DOSE, (OZEMPIC) 2 MG/1.5ML solution pen-injector; Inject 1 mg under the skin into the appropriate area as directed 1 (One) Time Per Week.  Dispense: 2 pen; Refill: 5    9. Exertional dyspnea    10. Irritable bowel syndrome with both constipation and diarrhea  -     amitriptyline (ELAVIL) 25 MG tablet; Take 1 tablet by mouth Every Night. For IBS symptoms  Dispense: 90 tablet; Refill: 1    11. Anginal pain (HCC)  -     isosorbide mononitrate (IMDUR) 30 MG 24 hr tablet; Take 1 tablet by mouth Daily.  Dispense: 90 tablet; Refill: 1    12. Hypokalemia  -     potassium chloride (K-DUR,KLOR-CON) 20 MEQ CR tablet; Take 1 tablet by mouth Daily.  Dispense: 90 tablet; Refill: 1    13. Lower urinary tract symptoms (LUTS)  -     finasteride (PROSCAR) 5 MG tablet; Take 1 tablet by mouth Daily.  Dispense: 90 tablet; Refill: 1    14. Acquired hypothyroidism  -     levothyroxine (SYNTHROID, LEVOTHROID) 25 MCG tablet; Take 1 tablet by mouth Daily.  Dispense: 90 tablet; Refill: 3    15. Vitamin D deficiency  -     Vitamin D 25 hydroxy; Future  -     Cholecalciferol (EQL Vitamin D3) 25 MCG (1000 UT) capsule; Take 3 capsules by mouth Daily. OTC  Dispense: 90 capsule; Refill: 5    16. Chronic allergic rhinitis  -     fluticasone (FLONASE) 50 MCG/ACT nasal spray; 1 spray into the nostril(s) as directed by provider Daily.  Dispense: 16 g; Refill: 5    17. Peripheral neuropathic pain  -     gabapentin (NEURONTIN) 300 MG capsule; Take 1 capsule by mouth At Night As Needed (neuropathy). of lower legs/feet  Dispense: 30 capsule; Refill: 0    18. Encounter for therapeutic drug level monitoring  -     ToxASSURE Select 13 Discrete -        Return in about 6 months (around 8/18/2022), or if symptoms worsen or fail to improve.             This document has been electronically signed by JOHNNY Tarango on February 27, 2022 15:19 CST

## 2022-02-20 ENCOUNTER — DOCUMENTATION (OUTPATIENT)
Dept: FAMILY MEDICINE CLINIC | Facility: CLINIC | Age: 57
End: 2022-02-20

## 2022-02-22 ENCOUNTER — CLINICAL SUPPORT (OUTPATIENT)
Dept: FAMILY MEDICINE CLINIC | Facility: CLINIC | Age: 57
End: 2022-02-22

## 2022-02-22 VITALS — DIASTOLIC BLOOD PRESSURE: 86 MMHG | SYSTOLIC BLOOD PRESSURE: 140 MMHG | OXYGEN SATURATION: 98 % | HEART RATE: 77 BPM

## 2022-03-01 ENCOUNTER — DOCUMENTATION (OUTPATIENT)
Dept: FAMILY MEDICINE CLINIC | Facility: CLINIC | Age: 57
End: 2022-03-01

## 2022-03-01 ENCOUNTER — TELEPHONE (OUTPATIENT)
Dept: FAMILY MEDICINE CLINIC | Facility: CLINIC | Age: 57
End: 2022-03-01

## 2022-03-01 RX ORDER — VALSARTAN 80 MG/1
80 TABLET ORAL DAILY
Qty: 30 TABLET | Refills: 0 | Status: SHIPPED | OUTPATIENT
Start: 2022-03-01 | End: 2022-04-04

## 2022-03-01 RX ORDER — METOPROLOL SUCCINATE 25 MG/1
25 TABLET, EXTENDED RELEASE ORAL DAILY
Qty: 30 TABLET | Refills: 0 | Status: SHIPPED | OUTPATIENT
Start: 2022-03-01 | End: 2022-04-04

## 2022-03-01 NOTE — TELEPHONE ENCOUNTER
Spoke with patient his BP is still running high it was 144/94 on 02/27/2022 he has not checked it the last two days I have sent Crystal a message to advise what patient will need to do.

## 2022-03-17 ENCOUNTER — TELEPHONE (OUTPATIENT)
Dept: FAMILY MEDICINE CLINIC | Facility: CLINIC | Age: 57
End: 2022-03-17

## 2022-03-17 DIAGNOSIS — M79.2 PERIPHERAL NEUROPATHIC PAIN: ICD-10-CM

## 2022-03-17 RX ORDER — GABAPENTIN 300 MG/1
300 CAPSULE ORAL NIGHTLY PRN
Qty: 30 CAPSULE | Refills: 0 | Status: SHIPPED | OUTPATIENT
Start: 2022-03-17 | End: 2022-04-22

## 2022-03-17 NOTE — TELEPHONE ENCOUNTER
Gabapentin refill sent. DORA reviewed and appropriate.     This document has been electronically signed by JOHNNY Tarango on March 17, 2022 13:06 CDT

## 2022-04-04 RX ORDER — VALSARTAN 80 MG/1
80 TABLET ORAL DAILY
Qty: 30 TABLET | Refills: 0 | Status: SHIPPED | OUTPATIENT
Start: 2022-04-04 | End: 2022-05-09

## 2022-04-04 RX ORDER — METOPROLOL SUCCINATE 25 MG/1
25 TABLET, EXTENDED RELEASE ORAL DAILY
Qty: 30 TABLET | Refills: 0 | Status: SHIPPED | OUTPATIENT
Start: 2022-04-04 | End: 2022-05-09

## 2022-04-13 ENCOUNTER — PRIOR AUTHORIZATION (OUTPATIENT)
Dept: FAMILY MEDICINE CLINIC | Facility: CLINIC | Age: 57
End: 2022-04-13

## 2022-04-13 NOTE — TELEPHONE ENCOUNTER
4/13/2022   BETH PANIAGUA (Key: [id number]) - [id number]  Ozempic (1 MG/DOSE) 4MG/3ML pen-injectors     Status: PA Response - Denied

## 2022-04-22 DIAGNOSIS — M79.2 PERIPHERAL NEUROPATHIC PAIN: ICD-10-CM

## 2022-04-22 RX ORDER — GABAPENTIN 300 MG/1
300 CAPSULE ORAL NIGHTLY PRN
Qty: 30 CAPSULE | Refills: 2 | Status: SHIPPED | OUTPATIENT
Start: 2022-04-22

## 2022-05-09 RX ORDER — VALSARTAN 80 MG/1
TABLET ORAL
Qty: 30 TABLET | Refills: 0 | Status: SHIPPED | OUTPATIENT
Start: 2022-05-09 | End: 2022-06-02

## 2022-05-09 RX ORDER — METOPROLOL SUCCINATE 25 MG/1
TABLET, EXTENDED RELEASE ORAL
Qty: 30 TABLET | Refills: 0 | Status: SHIPPED | OUTPATIENT
Start: 2022-05-09 | End: 2022-06-02
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
## 2022-05-20 DIAGNOSIS — M54.50 ACUTE MIDLINE LOW BACK PAIN WITHOUT SCIATICA: ICD-10-CM

## 2022-05-20 DIAGNOSIS — M54.2 CERVICAL PAIN (NECK): ICD-10-CM

## 2022-06-02 RX ORDER — VALSARTAN 80 MG/1
TABLET ORAL
Qty: 30 TABLET | Refills: 0 | Status: SHIPPED | OUTPATIENT
Start: 2022-06-02 | End: 2022-07-12

## 2022-06-02 RX ORDER — METOPROLOL SUCCINATE 25 MG/1
TABLET, EXTENDED RELEASE ORAL
Qty: 30 TABLET | Refills: 0 | Status: SHIPPED | OUTPATIENT
Start: 2022-06-02 | End: 2022-06-13

## 2022-06-13 RX ORDER — METOPROLOL SUCCINATE 25 MG/1
TABLET, EXTENDED RELEASE ORAL
Qty: 30 TABLET | Refills: 0 | Status: SHIPPED | OUTPATIENT
Start: 2022-06-13 | End: 2022-07-12

## 2022-06-24 ENCOUNTER — TELEPHONE (OUTPATIENT)
Dept: ENDOCRINOLOGY | Facility: CLINIC | Age: 57
End: 2022-06-24

## 2022-06-24 RX ORDER — ESTRADIOL 2 MG/1
TABLET ORAL
Qty: 150 TABLET | Refills: 0 | Status: SHIPPED | OUTPATIENT
Start: 2022-06-24 | End: 2022-06-27 | Stop reason: SDUPTHER

## 2022-06-24 NOTE — TELEPHONE ENCOUNTER
Patient has not been seen since 01/2021 but has an appt on June 27th. One refill is given until after patient keeps appt.

## 2022-06-24 NOTE — TELEPHONE ENCOUNTER
Pt called requesting a refill of estradiol to be called into [facility] pharmacy in [place].

## 2022-06-27 ENCOUNTER — SPECIALTY PHARMACY (OUTPATIENT)
Dept: ENDOCRINOLOGY | Facility: CLINIC | Age: 57
End: 2022-06-27

## 2022-06-27 ENCOUNTER — OFFICE VISIT (OUTPATIENT)
Dept: ENDOCRINOLOGY | Facility: CLINIC | Age: 57
End: 2022-06-27

## 2022-06-27 VITALS
OXYGEN SATURATION: 98 % | HEART RATE: 81 BPM | SYSTOLIC BLOOD PRESSURE: 148 MMHG | DIASTOLIC BLOOD PRESSURE: 70 MMHG | BODY MASS INDEX: 42.35 KG/M2 | WEIGHT: 239 LBS | HEIGHT: 63 IN

## 2022-06-27 DIAGNOSIS — I10 ESSENTIAL HYPERTENSION: ICD-10-CM

## 2022-06-27 DIAGNOSIS — E06.3 HYPOTHYROIDISM DUE TO HASHIMOTO'S THYROIDITIS: ICD-10-CM

## 2022-06-27 DIAGNOSIS — R39.9 LOWER URINARY TRACT SYMPTOMS (LUTS): Chronic | ICD-10-CM

## 2022-06-27 DIAGNOSIS — E03.8 HYPOTHYROIDISM DUE TO HASHIMOTO'S THYROIDITIS: ICD-10-CM

## 2022-06-27 DIAGNOSIS — E11.9 TYPE 2 DIABETES MELLITUS WITHOUT COMPLICATION, WITHOUT LONG-TERM CURRENT USE OF INSULIN: ICD-10-CM

## 2022-06-27 DIAGNOSIS — Z78.9 MALE-TO-FEMALE TRANSGENDER PERSON: Primary | ICD-10-CM

## 2022-06-27 PROCEDURE — 99214 OFFICE O/P EST MOD 30 MIN: CPT | Performed by: INTERNAL MEDICINE

## 2022-06-27 RX ORDER — TIRZEPATIDE 2.5 MG/.5ML
2.5 INJECTION, SOLUTION SUBCUTANEOUS WEEKLY
Qty: 2 ML | Refills: 0 | Status: SHIPPED | OUTPATIENT
Start: 2022-06-27 | End: 2022-07-21

## 2022-06-27 RX ORDER — PROGESTERONE 100 MG/1
100 CAPSULE ORAL DAILY
Qty: 30 CAPSULE | Refills: 11 | Status: SHIPPED | OUTPATIENT
Start: 2022-06-27

## 2022-06-27 RX ORDER — ESTRADIOL 2 MG/1
TABLET ORAL
Qty: 150 TABLET | Refills: 11 | Status: SHIPPED | OUTPATIENT
Start: 2022-06-27

## 2022-06-27 RX ORDER — FINASTERIDE 5 MG/1
5 TABLET, FILM COATED ORAL DAILY
Qty: 90 TABLET | Refills: 1 | Status: SHIPPED | OUTPATIENT
Start: 2022-06-27 | End: 2022-11-29

## 2022-06-27 RX ORDER — TIRZEPATIDE 5 MG/.5ML
5 INJECTION, SOLUTION SUBCUTANEOUS WEEKLY
Qty: 2 ML | Refills: 11 | Status: SHIPPED | OUTPATIENT
Start: 2022-06-27 | End: 2022-08-12

## 2022-06-27 NOTE — PROGRESS NOTES
"Chief Complaint  Male to female transgender    Subjective          Rupesh Valencia Jr. presents to [facility] ENDOCRINOLOGY for   History of Present Illness  followup      reason , transgender Male to Female     duration, since about age 49 years old      timing, constant desire     quality, presently controlled on HRT detailed below    She  has no concerns            Objective   Vital Signs:   /70   Pulse 81   Ht 160 cm (63\")   Wt 108 kg (239 lb)   SpO2 98%   BMI 42.34 kg/m²     Physical Exam  Constitutional:       Appearance: Normal appearance.   HENT:      Head: Normocephalic.   Cardiovascular:      Rate and Rhythm: Normal rate and regular rhythm.   Pulmonary:      Effort: Pulmonary effort is normal.      Breath sounds: Normal breath sounds.   Musculoskeletal:      Cervical back: Normal range of motion and neck supple.      Right lower leg: No edema.      Left lower leg: No edema.   Neurological:      Mental Status: He is alert.        Result Review :   The following data was reviewed by: Fausto Morrison MD on 01/08/2021:      Thyroid Workup    Lab Results   Component Value Date    TSH 2.020 06/14/2021    TSH 2.740 06/19/2020    TSH 1.880 01/17/2020       Lab Results   Component Value Date    FREET4 1.02 01/17/2020    FREET4 0.92 (L) 10/11/2019          TPO antibodies    Lab Results   Component Value Date    THYROIDAB 18 10/11/2019         Lab Results   Component Value Date    THGAB <1.0 10/11/2019       Component      Latest Ref Rng & Units 1/5/2021   Testosterone, Total      193.00 - 740.00 ng/dL 189.00 (L)   Estradiol      pg/mL 266.0                    Assessment and Plan    Problem List Items Addressed This Visit        Other    Male-to-female transgender person - Primary (Chronic)    Essential hypertension (Chronic)      Other Visit Diagnoses     Hypothyroidism due to Hashimoto's thyroiditis        Type 2 diabetes mellitus without complication, " "without long-term current use of insulin (HCC)               Transgender Male to Female                baseline prolactin ( nl ), estradiol (17)  testosterone ( 378)     Estradiol 10 mg daily   no aldactone due to ARF on HCTZ      finasteride 5 mg daily       progesterone 100 mg daily -        Presently not interested in surgery             HTN   /70   Pulse 81   Ht 160 cm (63\")   Wt 108 kg (239 lb)   SpO2 98%   BMI 42.34 kg/m²       on  , imdur, benicar     imdur was written by cardiology around 2014 for \"small vessels\" but no CAD    Takes 20 meq KCL daily that I wrote when he was taking a diuretic, K remains nl on it     IFG     Metformin effective at 500 mg qam     Hypothyroidism    On levothyroxine 25 mcgs daily started by Ms. Coello   I will add TSH to my labs to monitor        Follow Up   No follow-ups on file.  Patient was given instructions and counseling regarding his condition or for health maintenance advice. Please see specific information pulled into the AVS if appropriate.       "

## 2022-06-27 NOTE — PROGRESS NOTES
Specialty Pharmacy Program - Endocrinology       Rupesh Valencia Jr. is a 57 y.o. male with Prediabetes/weight management enrolled in the Endocrinology Patient Management program offered by [facility].  An initial outreach was conducted, including assessment of therapy appropriateness and specialty medication education for Mounjaro. The patient was introduced to services offered by our specialty pharmacy program, including: regular assessments, refill coordination, curbside pick-up or mail order delivery options, prior authorization maintenance, and financial assistance programs as applicable. The patient was also provided with contact information for the pharmacy team.     Insurance Coverage & Financial Support  Commercial    Relevant Past Medical History and Comorbidities  Relevant medical history and concomitant health conditions were discussed with the patient. The patient's chart has been reviewed for relevant past medical history and comorbid health conditions and updated as necessary.   Past Medical History:   Diagnosis Date   • Acute sinusitis    • Benign essential hypertension    • Carpal tunnel syndrome    • Chest pain    • Chronic sinusitis    • Cough    • Diabetes mellitus without complication (HCC)     type II or unspecified type, not stated as uncontrolled      • Diarrhea of presumed infectious origin    • Dyspnea    • Dyspnea on exertion     Angina equivalent      • Dysuria    • Essential hypertension    • Gender dysphoria      transgendered, crossdresses      • Hyperkeratosis    • Hypogonadism in male 2/24/2017   • Impaired fasting blood sugar    • Inflamed seborrheic keratosis    • Male hypogonadism    • Male-to-female transgender person 2/24/2017   • Male-to-female transsexuality    • Migraine    • Morbid obesity due to excess calories (HCC) 2/24/2017   • Obesity    • Pain in joint     unspecified   • Poisoning due to venomous spider    • Renal impairment    •  Routine general medical examination at a health care facility    • Special screening for malignant neoplasm of prostate    • Vitamin D deficiency 2/24/2017     Social History     Socioeconomic History   • Marital status:    Tobacco Use   • Smoking status: Never Smoker   • Smokeless tobacco: Never Used   Vaping Use   • Vaping Use: Never used   Substance and Sexual Activity   • Alcohol use: Yes     Comment: occasional   • Drug use: No   • Sexual activity: Defer            Allergies  Known allergies and reactions were discussed with the patient. The patient's chart has been reviewed for  allergy information and updated as necessary.   Augmentin [amoxicillin-pot clavulanate]    Allergies reviewed by Daron Antony, PharmD on 6/27/2022 at  1:30 PM    Current Medication List  This medication list has been reviewed with the patient and evaluated for any interactions or necessary modifications/recommendations, and updated to include all prescription medications, OTC medications, and supplements the patient is currently taking.  This list reflects what is contained in the patient's profile, which has also been marked as reviewed to communicate to other providers it is the most up to date version of the patient's current medication therapy.     Current Outpatient Medications:   •  albuterol sulfate  (90 Base) MCG/ACT inhaler, Inhale 2 puffs Every 4 (Four) Hours As Needed for Wheezing or Shortness of Air., Disp: 18 g, Rfl: 11  •  amitriptyline (ELAVIL) 25 MG tablet, Take 1 tablet by mouth Every Night. For IBS symptoms, Disp: 90 tablet, Rfl: 1  •  Cholecalciferol (EQL Vitamin D3) 25 MCG (1000 UT) capsule, Take 3 capsules by mouth Daily. OTC, Disp: 90 capsule, Rfl: 5  •  clotrimazole-betamethasone (Lotrisone) 1-0.05 % cream, Apply  topically to the appropriate area as directed 2 (Two) Times a Day., Disp: 45 g, Rfl: 5  •  cyanocobalamin (V-R VITAMIN B-12) 500 MCG tablet, Take 1 tablet by mouth Daily. OTC, Disp:  "90 tablet, Rfl: 1  •  estradiol (ESTRACE) 2 MG tablet, TAKE 5 TABLETS BY MOUTH DAILY., Disp: 150 tablet, Rfl: 11  •  finasteride (PROSCAR) 5 MG tablet, Take 1 tablet by mouth Daily., Disp: 90 tablet, Rfl: 1  •  fluticasone (FLONASE) 50 MCG/ACT nasal spray, 1 spray into the nostril(s) as directed by provider Daily., Disp: 16 g, Rfl: 5  •  gabapentin (NEURONTIN) 300 MG capsule, TAKE 1 CAPSULE BY MOUTH AT NIGHT AS NEEDED (NEUROPATHY). OF LOWER LEGS/FEET, Disp: 30 capsule, Rfl: 2  •  Insulin Pen Needle (Pen Needles) 32G X 4 MM misc, 1 each Every 7 (Seven) Days. For injection with Ozempic, Disp: 90 each, Rfl: 1  •  isosorbide mononitrate (IMDUR) 30 MG 24 hr tablet, Take 1 tablet by mouth Daily., Disp: 90 tablet, Rfl: 1  •  levothyroxine (SYNTHROID, LEVOTHROID) 25 MCG tablet, Take 1 tablet by mouth Daily., Disp: 90 tablet, Rfl: 3  •  metoprolol succinate XL (TOPROL-XL) 25 MG 24 hr tablet, TAKE 1 TABLET BY MOUTH DAILY., Disp: 30 tablet, Rfl: 0  •  potassium chloride (K-DUR,KLOR-CON) 20 MEQ CR tablet, Take 1 tablet by mouth Daily., Disp: 90 tablet, Rfl: 1  •  progesterone (PROMETRIUM) 100 MG capsule, Take 1 capsule by mouth Daily., Disp: 30 capsule, Rfl: 5  •  Progesterone (PROMETRIUM) 100 MG capsule, Take 1 capsule by mouth Daily., Disp: 30 capsule, Rfl: 11  •  Syringe/Needle, Disp, (SYRINGE LUER LOCK) 25G X 1\" 3 ML misc, 1 application Every 30 (Thirty) Days., Disp: 50 each, Rfl: 12  •  Tirzepatide (Mounjaro) 2.5 MG/0.5ML solution pen-injector, Inject 2.5 mg (1 pen) under the skin into the appropriate area as directed 1 (One) Time Per Week., Disp: 2 mL, Rfl: 0  •  Tirzepatide (Mounjaro) 5 MG/0.5ML solution pen-injector, Inject 5 mg (1 pen) under the skin into the appropriate area as directed 1 (One) Time Per Week., Disp: 2 mL, Rfl: 11  •  valsartan (DIOVAN) 80 MG tablet, TAKE 1 TABLET BY MOUTH DAILY., Disp: 30 tablet, Rfl: 0    Medicines reviewed by Daron nAtony, PharmD on 6/27/2022 at  1:38 PM    Drug " Interactions  None       Relevant Laboratory Values    Lab Results   Component Value Date    HGBA1C 5.13 06/14/2021     Lab Results   Component Value Date    GLUCOSE 104 (H) 06/14/2021    CALCIUM 9.7 06/14/2021     06/14/2021    K 3.6 06/14/2021    CO2 27.0 06/14/2021     06/14/2021    BUN 16 06/14/2021    CREATININE 1.14 06/14/2021    EGFRIFNONA 66 06/14/2021    BCR 14.0 06/14/2021    ANIONGAP 7.0 06/14/2021     Lab Results   Component Value Date    CHOL 193 01/05/2021    CHLPL 166 07/19/2016    TRIG 103 01/05/2021    HDL 91 (H) 01/05/2021    LDL 84 01/05/2021         Initial Education Provided for Specialty Medication  The patient has been provided with the following education and any applicable administration techniques (i.e. self-injection) have been demonstrated for the therapies indicated. All questions and concerns have been addressed prior to the patient receiving the medication, and the patient has verbalized understanding of the education and any materials provided.  Additional patient education shall be provided and documented upon request by the patient, provider or payer.        Adherence and Self-Administration  • Barriers to Patient Adherence and/or Self-Administration: None   • Methods for Supporting Patient Adherence and/or Self-Administration: None     Goals of Therapy   Goals     •  Specialty Pharmacy General Goal (pt-stated)       Would like to weight 180             Reassessment Plan & Follow-Up  1. Medication Therapy Changes: Start Mounjaro, has been off of ozempic for some time    2. Additional Plans, Therapy Recommendations, or Therapy Problems to Be Addressed: None   3. Pharmacist to perform regular reassessments no more than (6) months from the previous assessment.  4. Welcome information and patient satisfaction survey to be sent by retail team with patient's initial fill.  5. Care Coordinator to set up future refill outreaches, coordinate prescription delivery, and escalate  clinical questions to pharmacist.     Attestation  I attest that the initiated specialty medication(s) are appropriate for the patient based on my assessment.  If the prescribed therapy is at any point deemed not appropriate based on the current or future assessments, a consultation will be initiated with the patient's specialty care provider to determine the best course of action. The revised plan of therapy will be documented along with any additional patient education provided.     Michael Antony, [place], MPH, BCPS    6/27/2022  13:51 CDT

## 2022-07-12 DIAGNOSIS — K58.2 IRRITABLE BOWEL SYNDROME WITH BOTH CONSTIPATION AND DIARRHEA: Chronic | ICD-10-CM

## 2022-07-12 DIAGNOSIS — E87.6 HYPOKALEMIA: Chronic | ICD-10-CM

## 2022-07-12 RX ORDER — AMITRIPTYLINE HYDROCHLORIDE 25 MG/1
25 TABLET, FILM COATED ORAL NIGHTLY
Qty: 90 TABLET | Refills: 1 | Status: SHIPPED | OUTPATIENT
Start: 2022-07-12

## 2022-07-12 RX ORDER — VALSARTAN 80 MG/1
TABLET ORAL
Qty: 30 TABLET | Refills: 0 | Status: SHIPPED | OUTPATIENT
Start: 2022-07-12 | End: 2022-08-28

## 2022-07-12 RX ORDER — POTASSIUM CHLORIDE 20 MEQ/1
20 TABLET, EXTENDED RELEASE ORAL DAILY
Qty: 90 TABLET | Refills: 1 | Status: SHIPPED | OUTPATIENT
Start: 2022-07-12

## 2022-07-12 RX ORDER — METOPROLOL SUCCINATE 25 MG/1
TABLET, EXTENDED RELEASE ORAL
Qty: 30 TABLET | Refills: 0 | Status: SHIPPED | OUTPATIENT
Start: 2022-07-12

## 2022-07-21 ENCOUNTER — SPECIALTY PHARMACY (OUTPATIENT)
Dept: ENDOCRINOLOGY | Facility: CLINIC | Age: 57
End: 2022-07-21

## 2022-08-12 ENCOUNTER — SPECIALTY PHARMACY (OUTPATIENT)
Dept: ENDOCRINOLOGY | Facility: CLINIC | Age: 57
End: 2022-08-12

## 2022-08-12 RX ORDER — TIRZEPATIDE 7.5 MG/.5ML
7.5 INJECTION, SOLUTION SUBCUTANEOUS WEEKLY
Qty: 2 ML | Refills: 6 | Status: SHIPPED | OUTPATIENT
Start: 2022-08-12 | End: 2022-09-09

## 2022-08-15 ENCOUNTER — SPECIALTY PHARMACY (OUTPATIENT)
Dept: ENDOCRINOLOGY | Facility: CLINIC | Age: 57
End: 2022-08-15

## 2022-08-17 NOTE — PROGRESS NOTES
Specialty Pharmacy Refill Coordination Note     Rupesh is a 57 y.o. male contacted today regarding refills of  mounjaro specialty medication(s).    Reviewed and verified with patient:       Specialty medication(s) and dose(s) confirmed: n/a  Increasing dosage at this time do increase insulin control.              Follow-up: 1 month for next dosage review.      Greg Wharton  Specialty Pharmacy Technician

## 2022-08-26 DIAGNOSIS — I20.9 ANGINAL PAIN: ICD-10-CM

## 2022-08-28 RX ORDER — VALSARTAN 80 MG/1
TABLET ORAL
Qty: 30 TABLET | Refills: 0 | Status: SHIPPED | OUTPATIENT
Start: 2022-08-28

## 2022-08-28 RX ORDER — ISOSORBIDE MONONITRATE 30 MG/1
30 TABLET, EXTENDED RELEASE ORAL DAILY
Qty: 90 TABLET | Refills: 1 | Status: SHIPPED | OUTPATIENT
Start: 2022-08-28 | End: 2023-03-21

## 2022-09-09 ENCOUNTER — SPECIALTY PHARMACY (OUTPATIENT)
Dept: ENDOCRINOLOGY | Facility: CLINIC | Age: 57
End: 2022-09-09

## 2022-09-09 RX ORDER — TIRZEPATIDE 10 MG/.5ML
10 INJECTION, SOLUTION SUBCUTANEOUS WEEKLY
Qty: 2 ML | Refills: 6 | Status: SHIPPED | OUTPATIENT
Start: 2022-09-09 | End: 2022-10-11 | Stop reason: DRUGHIGH

## 2022-09-09 NOTE — PROGRESS NOTES
Specialty Pharmacy Refill Coordination Note     Rupesh is a 57 y.o. male contacted today regarding refills of  mounjaro specialty medication(s).    Reviewed and verified with patient:       Specialty medication(s) and dose(s) confirmed: yes    Refill Questions    Flowsheet Row Most Recent Value   Changes to allergies? No   Changes to medications? No   New conditions since last clinic visit No   Unplanned office visit, urgent care, ED, or hospital admission in the last 4 weeks  No   How does patient/caregiver feel medication is working? Very good   Financial problems or insurance changes  No   Since the previous refill, were any specialty medication doses or scheduled injections missed or delayed?  No   Does this patient require a clinical escalation to a pharmacist? No          Delivery Questions    Flowsheet Row Most Recent Value   Delivery method FedEx   Delivery address correct? Yes   Delivery phone number [phone]   Number of medications in delivery 1   Medication being filled and delivered mounjaro   Is there any medication that is due not being filled? No   Cooler needed? Yes   Do any medications need mixed or dated? No   Copay form of payment Credit card on file                 Follow-up: 28 day(s)     Radha Whitley, Pharmacy Technician  Specialty Pharmacy Technician

## 2022-09-12 ENCOUNTER — TELEPHONE (OUTPATIENT)
Dept: FAMILY MEDICINE CLINIC | Facility: CLINIC | Age: 57
End: 2022-09-12

## 2022-09-12 NOTE — TELEPHONE ENCOUNTER
Spoke with patient he was offered another provider here at [facility] and patient stated he will find his own PCP

## 2022-10-11 ENCOUNTER — SPECIALTY PHARMACY (OUTPATIENT)
Dept: ENDOCRINOLOGY | Facility: CLINIC | Age: 57
End: 2022-10-11

## 2022-10-11 RX ORDER — TIRZEPATIDE 12.5 MG/.5ML
12.5 INJECTION, SOLUTION SUBCUTANEOUS WEEKLY
Qty: 2 ML | Refills: 2 | Status: SHIPPED | OUTPATIENT
Start: 2022-10-11 | End: 2022-11-07

## 2022-10-11 NOTE — PROGRESS NOTES
Specialty Pharmacy Refill Coordination Note     Rupesh is a 57 y.o. male contacted today regarding refills of  mounjaro 10mg specialty medication(s).    Reviewed and verified with patient:       Specialty medication(s) and dose(s) confirmed: yes    Refill Questions    Flowsheet Row Most Recent Value   Changes to allergies? No   Changes to medications? No   New conditions since last clinic visit No   Unplanned office visit, urgent care, ED, or hospital admission in the last 4 weeks  No   How does patient/caregiver feel medication is working? Very good   Financial problems or insurance changes  No   Since the previous refill, were any specialty medication doses or scheduled injections missed or delayed?  No   Does this patient require a clinical escalation to a pharmacist? No          Delivery Questions    Flowsheet Row Most Recent Value   Delivery method FedEx   Delivery address correct? Yes   Delivery phone number [phone]   Number of medications in delivery 1   Medication being filled and delivered mounjaro   Is there any medication that is due not being filled? No   Supplies needed? No supplies needed   Cooler needed? Yes   Do any medications need mixed or dated? No   Copay form of payment Credit card on file   Questions or concerns for the pharmacist? No   Are any medications first time fills? No          Pt is doing well with mounjaro 10mg and is ready to increase to 12.5mg at this time        Follow-up: 28 day(s)     Radha Whitley, Pharmacy Technician  Specialty Pharmacy Technician

## 2022-11-07 ENCOUNTER — SPECIALTY PHARMACY (OUTPATIENT)
Dept: ENDOCRINOLOGY | Facility: CLINIC | Age: 57
End: 2022-11-07

## 2022-11-07 NOTE — PROGRESS NOTES
Specialty Pharmacy Refill Coordination Note     Rupesh is a 57 y.o. male contacted today regarding refills of  mounjaro 12.5mg specialty medication(s).    Reviewed and verified with patient:       Specialty medication(s) and dose(s) confirmed: yes    pt is tolerating mounjaro 12.5mg fine with no issues and would like to increase to the 15mg at this time.                Follow-up: 28 day(s)     Radha Whitley, Pharmacy Technician  Specialty Pharmacy Technician

## 2022-11-22 DIAGNOSIS — K58.2 IRRITABLE BOWEL SYNDROME WITH BOTH CONSTIPATION AND DIARRHEA: Chronic | ICD-10-CM

## 2022-11-22 DIAGNOSIS — E87.6 HYPOKALEMIA: Chronic | ICD-10-CM

## 2022-11-22 RX ORDER — VALSARTAN 80 MG/1
TABLET ORAL
Qty: 30 TABLET | Refills: 0 | OUTPATIENT
Start: 2022-11-22

## 2022-11-22 RX ORDER — AMITRIPTYLINE HYDROCHLORIDE 25 MG/1
25 TABLET, FILM COATED ORAL NIGHTLY
Qty: 90 TABLET | Refills: 1 | OUTPATIENT
Start: 2022-11-22

## 2022-11-22 RX ORDER — METOPROLOL SUCCINATE 25 MG/1
TABLET, EXTENDED RELEASE ORAL
Qty: 30 TABLET | Refills: 0 | OUTPATIENT
Start: 2022-11-22

## 2022-11-22 RX ORDER — POTASSIUM CHLORIDE 20 MEQ/1
20 TABLET, EXTENDED RELEASE ORAL DAILY
Qty: 90 TABLET | Refills: 1 | OUTPATIENT
Start: 2022-11-22

## 2022-11-29 DIAGNOSIS — R39.9 LOWER URINARY TRACT SYMPTOMS (LUTS): Chronic | ICD-10-CM

## 2022-11-29 RX ORDER — FINASTERIDE 5 MG/1
5 TABLET, FILM COATED ORAL DAILY
Qty: 90 TABLET | Refills: 1 | Status: SHIPPED | OUTPATIENT
Start: 2022-11-29 | End: 2023-01-31

## 2022-12-08 ENCOUNTER — SPECIALTY PHARMACY (OUTPATIENT)
Dept: ENDOCRINOLOGY | Facility: CLINIC | Age: 57
End: 2022-12-08

## 2022-12-21 ENCOUNTER — TELEPHONE (OUTPATIENT)
Dept: ENDOCRINOLOGY | Facility: CLINIC | Age: 57
End: 2022-12-21

## 2023-01-03 ENCOUNTER — SPECIALTY PHARMACY (OUTPATIENT)
Dept: ENDOCRINOLOGY | Facility: CLINIC | Age: 58
End: 2023-01-03
Payer: COMMERCIAL

## 2023-01-03 NOTE — PROGRESS NOTES
Specialty Pharmacy Refill Coordination Note     Rupesh is a 57 y.o. male contacted today regarding refills of  mounjaro specialty medication(s).    Reviewed and verified with patient:  Allergies       Specialty medication(s) and dose(s) confirmed: yes    Refill Questions    Flowsheet Row Most Recent Value   Changes to allergies? No   Changes to medications? No   New conditions since last clinic visit No   Unplanned office visit, urgent care, ED, or hospital admission in the last 4 weeks  No   How does patient/caregiver feel medication is working? Very good   Financial problems or insurance changes  No   Since the previous refill, were any specialty medication doses or scheduled injections missed or delayed?  No   Does this patient require a clinical escalation to a pharmacist? No          Delivery Questions    Flowsheet Row Most Recent Value   Delivery method FedEx   Delivery address correct? Yes   Delivery phone number [phone]   Number of medications in delivery 1   Medication being filled and delivered mounjaro 15mg   Is there any medication that is due not being filled? No   Supplies needed? No supplies needed   Cooler needed? Yes   Do any medications need mixed or dated? No   Copay form of payment Credit card on file   Questions or concerns for the pharmacist? No   Are any medications first time fills? 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  He will call us after hours and leave a message with any medication changes.  Mailing out this week.         Follow-up: 1 month.      Greg Wharton  Specialty Pharmacy Technician

## 2023-01-26 ENCOUNTER — SPECIALTY PHARMACY (OUTPATIENT)
Dept: ENDOCRINOLOGY | Facility: CLINIC | Age: 58
End: 2023-01-26
Payer: COMMERCIAL

## 2023-01-26 NOTE — PROGRESS NOTES
Specialty Pharmacy Refill Coordination Note     Rupesh is a 57 y.o. male contacted today regarding refills of  mounjaro specialty medication(s).    Reviewed and verified with patient:  Allergies       Specialty medication(s) and dose(s) confirmed: yes        Delivery Questions    Flowsheet Row Most Recent Value   Delivery method FedEx   Delivery address correct? Yes   Delivery phone number [phone]   Number of medications in delivery 1   Medication being filled and delivered mounjaro 15mg   Is there any medication that is due not being filled? No   Cooler needed? Yes   Do any medications need mixed or dated? No   Copay form of payment Credit card on file   Questions or concerns for the pharmacist? No   Are any medications first time fills? No   Shipment status Cooler packed        Pt called pharmacy for refill of mounjaro, mailing out today.          Follow-up: 1 month.      Greg Wharton  Specialty Pharmacy Technician

## 2023-01-31 DIAGNOSIS — R39.9 LOWER URINARY TRACT SYMPTOMS (LUTS): Chronic | ICD-10-CM

## 2023-01-31 DIAGNOSIS — E03.9 ACQUIRED HYPOTHYROIDISM: Chronic | ICD-10-CM

## 2023-01-31 RX ORDER — FINASTERIDE 5 MG/1
5 TABLET, FILM COATED ORAL DAILY
Qty: 90 TABLET | Refills: 1 | Status: SHIPPED | OUTPATIENT
Start: 2023-01-31

## 2023-01-31 RX ORDER — LEVOTHYROXINE SODIUM 0.03 MG/1
25 TABLET ORAL DAILY
Qty: 90 TABLET | Refills: 3 | OUTPATIENT
Start: 2023-01-31

## 2023-02-03 ENCOUNTER — SPECIALTY PHARMACY (OUTPATIENT)
Dept: ENDOCRINOLOGY | Facility: CLINIC | Age: 58
End: 2023-02-03
Payer: COMMERCIAL

## 2023-02-08 ENCOUNTER — DOCUMENTATION (OUTPATIENT)
Dept: ENDOCRINOLOGY | Facility: CLINIC | Age: 58
End: 2023-02-08
Payer: COMMERCIAL

## 2023-02-09 ENCOUNTER — SPECIALTY PHARMACY (OUTPATIENT)
Dept: ENDOCRINOLOGY | Facility: CLINIC | Age: 58
End: 2023-02-09
Payer: COMMERCIAL

## 2023-02-16 DIAGNOSIS — K58.2 IRRITABLE BOWEL SYNDROME WITH BOTH CONSTIPATION AND DIARRHEA: Chronic | ICD-10-CM

## 2023-02-17 RX ORDER — AMITRIPTYLINE HYDROCHLORIDE 25 MG/1
25 TABLET, FILM COATED ORAL NIGHTLY
Qty: 90 TABLET | Refills: 1 | OUTPATIENT
Start: 2023-02-17

## 2023-02-23 ENCOUNTER — SPECIALTY PHARMACY (OUTPATIENT)
Dept: ENDOCRINOLOGY | Facility: CLINIC | Age: 58
End: 2023-02-23
Payer: COMMERCIAL

## 2023-02-23 NOTE — PROGRESS NOTES
Specialty Pharmacy Refill Coordination Note     Rupesh is a 58 y.o. male contacted today regarding refills of  mounjaro  specialty medication(s).    Reviewed and verified with patient:       Specialty medication(s) and dose(s) confirmed: yes    Refill Questions    Flowsheet Row Most Recent Value   Changes to allergies? No   Changes to medications? No   New conditions since last clinic visit No   Unplanned office visit, urgent care, ED, or hospital admission in the last 4 weeks  No   How does patient/caregiver feel medication is working? Very good   Financial problems or insurance changes  No   Since the previous refill, were any specialty medication doses or scheduled injections missed or delayed?  No   Does this patient require a clinical escalation to a pharmacist? No          Delivery Questions    Flowsheet Row Most Recent Value   Delivery method [employer]Ex   Delivery address correct? Yes   Delivery phone number [email or web address]   Number of medications in delivery 1   Medication being filled and delivered mounjaro   Is there any medication that is due not being filled? No   Supplies needed? No supplies needed   Cooler needed? Yes   Do any medications need mixed or dated? No   Copay form of payment Credit card on file   Questions or concerns for the pharmacist? No   Are any medications first time fills? No                 Follow-up: 30 day(s)     Radha Whitley, Pharmacy Technician  Specialty Pharmacy Technician

## 2023-03-21 DIAGNOSIS — E03.9 ACQUIRED HYPOTHYROIDISM: Chronic | ICD-10-CM

## 2023-03-21 DIAGNOSIS — I20.9 ANGINAL PAIN: ICD-10-CM

## 2023-03-21 RX ORDER — ISOSORBIDE MONONITRATE 30 MG/1
30 TABLET, EXTENDED RELEASE ORAL DAILY
Qty: 90 TABLET | Refills: 1 | Status: SHIPPED | OUTPATIENT
Start: 2023-03-21

## 2023-03-21 RX ORDER — LEVOTHYROXINE SODIUM 0.03 MG/1
25 TABLET ORAL DAILY
Qty: 90 TABLET | Refills: 3 | OUTPATIENT
Start: 2023-03-21

## 2023-03-22 ENCOUNTER — SPECIALTY PHARMACY (OUTPATIENT)
Dept: ENDOCRINOLOGY | Facility: CLINIC | Age: 58
End: 2023-03-22
Payer: COMMERCIAL

## 2023-03-22 NOTE — PROGRESS NOTES
Specialty Pharmacy Refill Coordination Note     Rupesh is a 58 y.o. male contacted today regarding refills of  mounjaro specialty medication(s).    Reviewed and verified with patient:  Allergies       Specialty medication(s) and dose(s) confirmed: yes    Refill Questions    Flowsheet Row Most Recent Value   Changes to allergies? No   Changes to medications? No   New conditions since last clinic visit No   Unplanned office visit, urgent care, ED, or hospital admission in the last 4 weeks  No   How does patient/caregiver feel medication is working? Very good   Financial problems or insurance changes  No   Since the previous refill, were any specialty medication doses or scheduled injections missed or delayed?  No   Does this patient require a clinical escalation to a pharmacist? No          Delivery Questions    Flowsheet Row Most Recent Value   Delivery method FedEx   Delivery address correct? Yes   Delivery phone number [email or web address]   Number of medications in delivery 1   Medication being filled and delivered mounjaro   Doses left of specialty medications 1   Is there any medication that is due not being filled? No   Supplies needed? No supplies needed   Cooler needed? Yes   Do any medications need mixed or dated? No   Copay form of payment Credit card on file   Questions or concerns for the pharmacist? No   Are any medications first time fills? No        No issues reported         Follow-up: 1 month.      Greg Wharton  Specialty Pharmacy Technician

## 2023-04-13 ENCOUNTER — LAB (OUTPATIENT)
Dept: LAB | Facility: OTHER | Age: 58
End: 2023-04-13
Payer: COMMERCIAL

## 2023-04-13 LAB
ALBUMIN SERPL-MCNC: 4 G/DL (ref 3.5–5)
ALBUMIN/GLOB SERPL: 1.3 G/DL (ref 1.1–1.8)
ALP SERPL-CCNC: 90 U/L (ref 38–126)
ALT SERPL W P-5'-P-CCNC: 18 U/L
ANION GAP SERPL CALCULATED.3IONS-SCNC: 8 MMOL/L (ref 5–15)
AST SERPL-CCNC: 21 U/L (ref 17–59)
BASOPHILS # BLD AUTO: 0.04 10*3/MM3 (ref 0–0.2)
BASOPHILS NFR BLD AUTO: 0.4 % (ref 0–1.5)
BILIRUB SERPL-MCNC: 0.7 MG/DL (ref 0.2–1.3)
BUN SERPL-MCNC: 15 MG/DL (ref 7–23)
BUN/CREAT SERPL: 12 (ref 7–25)
CALCIUM SPEC-SCNC: 9.1 MG/DL (ref 8.4–10.2)
CHLORIDE SERPL-SCNC: 102 MMOL/L (ref 101–112)
CHOLEST SERPL-MCNC: 146 MG/DL (ref 150–200)
CO2 SERPL-SCNC: 26 MMOL/L (ref 22–30)
CREAT SERPL-MCNC: 1.25 MG/DL (ref 0.7–1.3)
DEPRECATED RDW RBC AUTO: 44.4 FL (ref 37–54)
EGFRCR SERPLBLD CKD-EPI 2021: 66.3 ML/MIN/1.73
EOSINOPHIL # BLD AUTO: 0.3 10*3/MM3 (ref 0–0.4)
EOSINOPHIL NFR BLD AUTO: 3.1 % (ref 0.3–6.2)
ERYTHROCYTE [DISTWIDTH] IN BLOOD BY AUTOMATED COUNT: 13.8 % (ref 12.3–15.4)
GLOBULIN UR ELPH-MCNC: 3.2 GM/DL (ref 2.3–3.5)
GLUCOSE SERPL-MCNC: 100 MG/DL (ref 70–99)
HBA1C MFR BLD: 4.8 % (ref 4.8–5.6)
HCT VFR BLD AUTO: 41.3 % (ref 37.5–51)
HDLC SERPL-MCNC: 61 MG/DL (ref 40–59)
HGB BLD-MCNC: 13.9 G/DL (ref 13–17.7)
LDLC SERPL CALC-MCNC: 69 MG/DL
LDLC/HDLC SERPL: 1.11 {RATIO} (ref 0–3.55)
LYMPHOCYTES # BLD AUTO: 2.1 10*3/MM3 (ref 0.7–3.1)
LYMPHOCYTES NFR BLD AUTO: 21.5 % (ref 19.6–45.3)
MCH RBC QN AUTO: 30.3 PG (ref 26.6–33)
MCHC RBC AUTO-ENTMCNC: 33.7 G/DL (ref 31.5–35.7)
MCV RBC AUTO: 90 FL (ref 79–97)
MONOCYTES # BLD AUTO: 0.95 10*3/MM3 (ref 0.1–0.9)
MONOCYTES NFR BLD AUTO: 9.7 % (ref 5–12)
NEUTROPHILS NFR BLD AUTO: 6.4 10*3/MM3 (ref 1.7–7)
NEUTROPHILS NFR BLD AUTO: 65.3 % (ref 42.7–76)
PLATELET # BLD AUTO: 250 10*3/MM3 (ref 140–450)
PMV BLD AUTO: 9.9 FL (ref 6–12)
POTASSIUM SERPL-SCNC: 3.9 MMOL/L (ref 3.4–5)
PROT SERPL-MCNC: 7.2 G/DL (ref 6.3–8.6)
RBC # BLD AUTO: 4.59 10*6/MM3 (ref 4.14–5.8)
SODIUM SERPL-SCNC: 136 MMOL/L (ref 137–145)
TRIGL SERPL-MCNC: 85 MG/DL
TSH SERPL DL<=0.05 MIU/L-ACNC: 2.78 UIU/ML (ref 0.27–4.2)
VLDLC SERPL-MCNC: 16 MG/DL (ref 5–40)
WBC NRBC COR # BLD: 9.79 10*3/MM3 (ref 3.4–10.8)

## 2023-04-13 PROCEDURE — 82607 VITAMIN B-12: CPT | Performed by: INTERNAL MEDICINE

## 2023-04-13 PROCEDURE — 83525 ASSAY OF INSULIN: CPT | Performed by: INTERNAL MEDICINE

## 2023-04-13 PROCEDURE — 84403 ASSAY OF TOTAL TESTOSTERONE: CPT | Performed by: INTERNAL MEDICINE

## 2023-04-13 PROCEDURE — 83036 HEMOGLOBIN GLYCOSYLATED A1C: CPT | Performed by: INTERNAL MEDICINE

## 2023-04-13 PROCEDURE — 80061 LIPID PANEL: CPT | Performed by: INTERNAL MEDICINE

## 2023-04-13 PROCEDURE — 36415 COLL VENOUS BLD VENIPUNCTURE: CPT | Performed by: INTERNAL MEDICINE

## 2023-04-13 PROCEDURE — 80050 GENERAL HEALTH PANEL: CPT | Performed by: INTERNAL MEDICINE

## 2023-04-13 PROCEDURE — 82670 ASSAY OF TOTAL ESTRADIOL: CPT | Performed by: INTERNAL MEDICINE

## 2023-04-14 ENCOUNTER — OFFICE VISIT (OUTPATIENT)
Dept: ENDOCRINOLOGY | Facility: CLINIC | Age: 58
End: 2023-04-14
Payer: COMMERCIAL

## 2023-04-14 VITALS
HEART RATE: 79 BPM | BODY MASS INDEX: 38.27 KG/M2 | OXYGEN SATURATION: 97 % | SYSTOLIC BLOOD PRESSURE: 130 MMHG | DIASTOLIC BLOOD PRESSURE: 80 MMHG | HEIGHT: 63 IN | WEIGHT: 216 LBS

## 2023-04-14 DIAGNOSIS — E03.8 HYPOTHYROIDISM DUE TO HASHIMOTO'S THYROIDITIS: ICD-10-CM

## 2023-04-14 DIAGNOSIS — I10 ESSENTIAL HYPERTENSION: ICD-10-CM

## 2023-04-14 DIAGNOSIS — Z78.9 MALE-TO-FEMALE TRANSGENDER PERSON: Primary | ICD-10-CM

## 2023-04-14 DIAGNOSIS — E06.3 HYPOTHYROIDISM DUE TO HASHIMOTO'S THYROIDITIS: ICD-10-CM

## 2023-04-14 DIAGNOSIS — R39.9 LOWER URINARY TRACT SYMPTOMS (LUTS): Chronic | ICD-10-CM

## 2023-04-14 DIAGNOSIS — E11.9 TYPE 2 DIABETES MELLITUS WITHOUT COMPLICATION, WITHOUT LONG-TERM CURRENT USE OF INSULIN: ICD-10-CM

## 2023-04-14 LAB
ESTRADIOL SERPL HS-MCNC: 167 PG/ML
TESTOST SERPL-MCNC: 620 NG/DL (ref 193–740)
VIT B12 BLD-MCNC: 500 PG/ML (ref 211–946)

## 2023-04-14 RX ORDER — ESTRADIOL 2 MG/1
TABLET ORAL
Qty: 450 TABLET | Refills: 3 | Status: SHIPPED | OUTPATIENT
Start: 2023-04-14

## 2023-04-14 RX ORDER — FINASTERIDE 5 MG/1
5 TABLET, FILM COATED ORAL DAILY
Qty: 90 TABLET | Refills: 3 | Status: SHIPPED | OUTPATIENT
Start: 2023-04-14

## 2023-04-14 RX ORDER — PROGESTERONE 100 MG/1
100 CAPSULE ORAL DAILY
Qty: 90 CAPSULE | Refills: 3 | Status: SHIPPED | OUTPATIENT
Start: 2023-04-14

## 2023-04-14 NOTE — PROGRESS NOTES
"Chief Complaint  Male to female transgender    Subjective          Rupesh Valencia Jr. presents to [facility] for   History of Present Illness  followup      reason , transgender Male to Female     duration, since about age 49 years old      timing, constant desire     quality, presently controlled on HRT detailed below    She  has no concerns            Objective   Vital Signs:   /80   Pulse 79   Ht 160 cm (63\")   Wt 98 kg (216 lb)   SpO2 97%   BMI 38.26 kg/m²     Physical Exam  Constitutional:       Appearance: Normal appearance.   HENT:      Head: Normocephalic.   Cardiovascular:      Rate and Rhythm: Normal rate and regular rhythm.   Pulmonary:      Effort: Pulmonary effort is normal.      Breath sounds: Normal breath sounds.   Musculoskeletal:      Cervical back: Normal range of motion and neck supple.      Right lower leg: No edema.      Left lower leg: No edema.   Neurological:      Mental Status: He is alert.        Result Review :   The following data was reviewed by: Fausto Morrison MD on 01/08/2021:  Common labs        10/23/2022    11:28 4/13/2023    14:58   Common Labs   Glucose  100     Glucose 121         BUN 19      15     Creatinine 1.5      1.25     Sodium 139      136     Potassium 4.9      3.9     Chloride 103      102     Calcium 9.8      9.1     Albumin 3.6      4.0     Total Bilirubin 0.80      0.7     Alkaline Phosphatase 132      90     AST (SGOT) 13      21     ALT (SGPT) 11      18     WBC  9.79     Hemoglobin  13.9     Hematocrit  41.3     Platelets  250     Total Cholesterol  146     Triglycerides  85     HDL Cholesterol  61     LDL Cholesterol   69     Hemoglobin A1C  4.80         This result is from an external source.       Thyroid Workup    Lab Results   Component Value Date    TSH 2.780 04/13/2023    TSH 2.020 06/14/2021    TSH 2.740 06/19/2020       Lab Results   Component Value Date    FREET4 1.02 01/17/2020 " "   FREET4 0.92 (L) 10/11/2019          TPO antibodies    Lab Results   Component Value Date    THYROIDAB 18 10/11/2019         Lab Results   Component Value Date    THGAB <1.0 10/11/2019       Lab Results   Component Value Date    ESTRADIOL 167.0 04/13/2023    ESTRADIOL 38.5 06/14/2021    ESTRADIOL 266.0 01/05/2021    TESTOSTERONE 620.00 04/13/2023    TESTOSTERONE 404.00 06/14/2021    TESTOSTERONE 189.00 (L) 01/05/2021   {              Assessment and Plan    Problem List Items Addressed This Visit        Other    Male-to-female transgender person - Primary (Chronic)    Essential hypertension (Chronic)    Lower urinary tract symptoms (LUTS) (Chronic)    Relevant Medications    finasteride (PROSCAR) 5 MG tablet   Other Visit Diagnoses     Type 2 diabetes mellitus without complication, without long-term current use of insulin        Relevant Medications    Tirzepatide 15 MG/0.5ML solution pen-injector    Hypothyroidism due to Hashimoto's thyroiditis               Transgender Male to Female                baseline prolactin ( nl ), estradiol (17)  testosterone ( 378)     Estradiol 10 mg daily   no aldactone due to ARF on HCTZ      finasteride 5 mg daily       progesterone 100 mg daily -        Presently not interested in surgery             HTN   /80   Pulse 79   Ht 160 cm (63\")   Wt 98 kg (216 lb)   SpO2 97%   BMI 38.26 kg/m²       on  , imdur, benicar     imdur was written by cardiology around 2014 for \"small vessels\" but no CAD    Takes 20 meq KCL daily that I wrote when he was taking a diuretic, K remains nl on it     Type 2 DM    We stopped metformin   Started mounjaro 15 mg , effective    Lab Results   Component Value Date    HGBA1C 4.80 04/13/2023       Hypothyroidism    On levothyroxine 25 mcgs daily started by Ms. Coello   I will add TSH to my labs to monitor     Lab Results   Component Value Date    TSH 2.780 04/13/2023           Follow Up   No follow-ups on file.  Patient was given instructions and " counseling regarding his condition or for health maintenance advice. Please see specific information pulled into the AVS if appropriate.     New Medications Ordered This Visit   Medications   • Progesterone (PROMETRIUM) 100 MG capsule     Sig: Take 1 capsule by mouth Daily.     Dispense:  90 capsule     Refill:  3   • finasteride (PROSCAR) 5 MG tablet     Sig: Take 1 tablet by mouth Daily.     Dispense:  90 tablet     Refill:  3   • estradiol (ESTRACE) 2 MG tablet     Sig: TAKE 5 TABLETS BY MOUTH DAILY.     Dispense:  450 tablet     Refill:  3   • Tirzepatide 15 MG/0.5ML solution pen-injector     Sig: Inject 15 mg (1 pen) under the skin into the appropriate area as directed 1 (One) Time Per Week.     Dispense:  2 mL     Refill:  11       Orders Placed This Encounter   Procedures   • CBC Auto Differential   • Comprehensive Metabolic Panel   • Hemoglobin A1c   • Lipid Panel   • Microalbumin / Creatinine Urine Ratio - Urine, Clean Catch   • TSH   • Vitamin B12   • Testosterone   • Estradiol             This document has been electronically signed by Fausto Morrison MD on April 14, 2023 09:18 CDT

## 2023-04-15 LAB — INSULIN SERPL-ACNC: 51.3 UIU/ML (ref 2.6–24.9)

## 2023-04-25 ENCOUNTER — SPECIALTY PHARMACY (OUTPATIENT)
Dept: ENDOCRINOLOGY | Facility: CLINIC | Age: 58
End: 2023-04-25
Payer: COMMERCIAL

## 2023-04-25 RX ORDER — TIRZEPATIDE 12.5 MG/.5ML
12.5 INJECTION, SOLUTION SUBCUTANEOUS WEEKLY
Qty: 2 ML | Refills: 6 | Status: SHIPPED | OUTPATIENT
Start: 2023-04-25

## 2023-04-26 ENCOUNTER — SPECIALTY PHARMACY (OUTPATIENT)
Dept: ENDOCRINOLOGY | Facility: CLINIC | Age: 58
End: 2023-04-26
Payer: COMMERCIAL

## 2023-04-26 NOTE — PROGRESS NOTES
Specialty Pharmacy Refill Coordination Note     Rupesh is a 58 y.o. male contacted today regarding refills of  mounjaro specialty medication(s).    Reviewed and verified with patient:       Specialty medication(s) and dose(s) confirmed: yes    Refill Questions    Flowsheet Row Most Recent Value   Changes to allergies? No   Changes to medications? No   New conditions since last clinic visit No   Unplanned office visit, urgent care, ED, or hospital admission in the last 4 weeks  No   How does patient/caregiver feel medication is working? Excellent   Financial problems or insurance changes  No   Since the previous refill, were any specialty medication doses or scheduled injections missed or delayed?  No   Does this patient require a clinical escalation to a pharmacist? No          Delivery Questions    Flowsheet Row Most Recent Value   Delivery method [employer]   Delivery address correct? Yes   Delivery phone number [email or web address]   Number of medications in delivery 1   Medication being filled and delivered mounjaro   Doses left of specialty medications 1   Is there any medication that is due not being filled? No   Supplies needed? No supplies needed   Cooler needed? Yes   Do any medications need mixed or dated? No   Copay form of payment Credit card on file   Questions or concerns for the pharmacist? No   Are any medications first time fills? No   Tracking number for delivery 15mg OOS sending a 12.5mg        Spoke with Mr Valencia yesterday because the 15mg mounjaro is out of stock.  We are going to send him the 12.5mg for a month and follow up next month on what dose we have in stock.  Mailing out today.           Follow-up: 1 month.      Greg Wharton  Specialty Pharmacy Technician

## 2023-05-22 ENCOUNTER — SPECIALTY PHARMACY (OUTPATIENT)
Dept: ENDOCRINOLOGY | Facility: CLINIC | Age: 58
End: 2023-05-22
Payer: COMMERCIAL

## 2023-05-22 NOTE — PROGRESS NOTES
Specialty Pharmacy Refill Coordination Note     Rupesh is a 58 y.o. male contacted today regarding refills of  mounjaro 15mg specialty medication(s).    Reviewed and verified with patient:       Specialty medication(s) and dose(s) confirmed: yes    Refill Questions    Flowsheet Row Most Recent Value   Changes to allergies? No   Changes to medications? No   Financial problems or insurance changes  No   Since the previous refill, were any specialty medication doses or scheduled injections missed or delayed?  No   Does this patient require a clinical escalation to a pharmacist? No          Delivery Questions    Flowsheet Row Most Recent Value   Delivery method [employer]   Delivery address correct? Yes   Delivery phone number [phone]   Number of medications in delivery 1   Medication being filled and delivered mounjaro 15   Copay form of payment Credit card on file   Questions or concerns for the pharmacist? No                 Follow-up: 30 day(s)     Radha Whitley, Pharmacy Technician  Specialty Pharmacy Technician

## 2023-06-06 PROBLEM — E11.9 TYPE 2 DIABETES MELLITUS: Status: ACTIVE | Noted: 2023-06-06

## 2023-07-31 ENCOUNTER — TELEPHONE (OUTPATIENT)
Dept: ENDOCRINOLOGY | Facility: CLINIC | Age: 58
End: 2023-07-31
Payer: COMMERCIAL

## 2023-07-31 RX ORDER — TIRZEPATIDE 15 MG/.5ML
15 INJECTION, SOLUTION SUBCUTANEOUS WEEKLY
Qty: 2 ML | Refills: 3 | Status: SHIPPED | OUTPATIENT
Start: 2023-07-31

## 2023-09-20 DIAGNOSIS — I20.9 ANGINAL PAIN: ICD-10-CM

## 2023-09-20 RX ORDER — ISOSORBIDE MONONITRATE 30 MG/1
30 TABLET, EXTENDED RELEASE ORAL DAILY
Qty: 90 TABLET | Refills: 1 | Status: SHIPPED | OUTPATIENT
Start: 2023-09-20

## 2025-02-15 NOTE — PROGRESS NOTES
Specialty Pharmacy Refill Coordination Note     Rupesh is a 57 y.o. male contacted today regarding refills of  mounjaro specialty medication(s).    Reviewed and verified with patient:       Specialty medication(s) and dose(s) confirmed: yes    Mailed out today per Michael.               Follow-up: 1 month.      Greg Wharton  Specialty Pharmacy Technician      
15-Feb-2025 09:12

## (undated) DEVICE — SINGLE-USE BIOPSY FORCEPS: Brand: RADIAL JAW 4